# Patient Record
Sex: MALE | Race: OTHER | NOT HISPANIC OR LATINO | Employment: OTHER | URBAN - METROPOLITAN AREA
[De-identification: names, ages, dates, MRNs, and addresses within clinical notes are randomized per-mention and may not be internally consistent; named-entity substitution may affect disease eponyms.]

---

## 2017-08-17 ENCOUNTER — HISTORICAL (OUTPATIENT)
Dept: ADMINISTRATIVE | Facility: HOSPITAL | Age: 62
End: 2017-08-17

## 2017-08-17 LAB
ALBUMIN SERPL-MCNC: 2.5 GM/DL (ref 3.4–5)
ALBUMIN/GLOB SERPL: 0 RATIO (ref 1–2)
ALP SERPL-CCNC: 100 UNIT/L (ref 45–117)
ALT SERPL-CCNC: 49 UNIT/L (ref 12–78)
AMPHET UR QL SCN: NEGATIVE
ANISOCYTOSIS BLD QL SMEAR: ABNORMAL
ANISOCYTOSIS BLD QL SMEAR: ABNORMAL
AST SERPL-CCNC: 69 UNIT/L (ref 15–37)
BARBITURATE SCN PRESENT UR: NEGATIVE
BASOPHILS NFR BLD MANUAL: 1 %
BASOPHILS NFR BLD MANUAL: 1 %
BENZODIAZ UR QL SCN: NEGATIVE
BILIRUB SERPL-MCNC: 0.3 MG/DL (ref 0.2–1)
BILIRUBIN DIRECT+TOT PNL SERPL-MCNC: 0.1 MG/DL
BILIRUBIN DIRECT+TOT PNL SERPL-MCNC: 0.2 MG/DL
BUN SERPL-MCNC: 80 MG/DL (ref 7–18)
CALCIUM SERPL-MCNC: 8.4 MG/DL (ref 8.5–10.1)
CANNABINOIDS UR QL SCN: NEGATIVE
CD3+CD4+ CELLS # SPEC: 16 UNIT/L (ref 589–1505)
CD3+CD4+ CELLS NFR BLD: 7.5 % (ref 31–59)
CHLORIDE SERPL-SCNC: 96 MMOL/L (ref 98–107)
CO2 SERPL-SCNC: 23 MMOL/L (ref 21–32)
COCAINE UR QL SCN: POSITIVE
CREAT SERPL-MCNC: 5.5 MG/DL (ref 0.6–1.3)
EOSINOPHIL NFR BLD MANUAL: 1 %
EOSINOPHIL NFR BLD MANUAL: 1 %
ERYTHROCYTE [DISTWIDTH] IN BLOOD BY AUTOMATED COUNT: 15.9 % (ref 11.5–14.5)
ERYTHROCYTE [DISTWIDTH] IN BLOOD BY AUTOMATED COUNT: 15.9 % (ref 11.5–14.5)
GLOBULIN SER-MCNC: 5.6 GM/ML (ref 2.3–3.5)
GLUCOSE SERPL-MCNC: 93 MG/DL (ref 74–106)
GRANULOCYTES NFR BLD MANUAL: 32 % (ref 43–75)
GRANULOCYTES NFR BLD MANUAL: 32 % (ref 43–75)
HCT VFR BLD AUTO: 24.1 % (ref 40–51)
HCT VFR BLD AUTO: 25.3 % (ref 40–51)
HGB BLD-MCNC: 8.1 GM/DL (ref 13.5–17.5)
HGB BLD-MCNC: 8.3 GM/DL (ref 13.5–17.5)
HYPOCHROMIA BLD QL SMEAR: ABNORMAL
HYPOCHROMIA BLD QL SMEAR: ABNORMAL
LYMPHOCYTES # BLD AUTO: 216 UNIT/L (ref 1260–5520)
LYMPHOCYTES NFR BLD MANUAL: 18 % (ref 20.5–51.1)
LYMPHOCYTES NFR BLD MANUAL: 18 % (ref 20.5–51.1)
LYMPHOCYTES NFR LN MANUAL: 18 % (ref 28–48)
LYMPHOMA - T-CELL MARKERS SPEC-IMP: ABNORMAL
MCH RBC QN AUTO: 28.6 PG (ref 26–34)
MCH RBC QN AUTO: 29.5 PG (ref 26–34)
MCHC RBC AUTO-ENTMCNC: 32.8 GM/DL (ref 31–37)
MCHC RBC AUTO-ENTMCNC: 33.6 GM/DL (ref 31–37)
MCV RBC AUTO: 87.2 FL (ref 80–100)
MCV RBC AUTO: 87.6 FL (ref 80–100)
METAMYELOCYTES NFR BLD MANUAL: 6 %
METAMYELOCYTES NFR BLD MANUAL: 6 %
MONOCYTES NFR BLD MANUAL: 19 % (ref 2–9)
MONOCYTES NFR BLD MANUAL: 19 % (ref 2–9)
NEUTS BAND NFR BLD MANUAL: 23 % (ref 0–10)
NEUTS BAND NFR BLD MANUAL: 23 % (ref 0–10)
OPIATES UR QL SCN: NEGATIVE
PCP UR QL: NEGATIVE
PH UR STRIP.AUTO: 5.5 [PH] (ref 5–8)
PLATELET # BLD AUTO: 127 X10(3)/MCL (ref 130–400)
PLATELET # BLD AUTO: 136 X10(3)/MCL (ref 130–400)
PLATELET # BLD EST: ADEQUATE 10*3/UL
PLATELET # BLD EST: ADEQUATE 10*3/UL
PMV BLD AUTO: 11.3 FL (ref 7.4–10.4)
PMV BLD AUTO: 11.3 FL (ref 7.4–10.4)
POTASSIUM SERPL-SCNC: 4.6 MMOL/L (ref 3.5–5.1)
PROT SERPL-MCNC: 8.1 GM/DL (ref 6.4–8.2)
RBC # BLD AUTO: 2.75 X10(6)/MCL (ref 4.5–5.9)
RBC # BLD AUTO: 2.9 X10(6)/MCL (ref 4.5–5.9)
RBC MORPH BLD: ABNORMAL
RBC MORPH BLD: ABNORMAL
SODIUM SERPL-SCNC: 132 MMOL/L (ref 136–145)
TEMPERATURE, URINE (OHS): 20.6 DEGC (ref 20–25)
WBC # BLD AUTO: 1200 /MM3 (ref 4500–11500)
WBC # SPEC AUTO: 1.1 X10(3)/MCL (ref 4.5–11)
WBC # SPEC AUTO: 1.2 X10(3)/MCL (ref 4.5–11)

## 2017-08-22 LAB
FINAL CULTURE: NORMAL
FINAL CULTURE: NORMAL

## 2017-10-06 ENCOUNTER — HISTORICAL (OUTPATIENT)
Dept: ENDOSCOPY | Facility: HOSPITAL | Age: 62
End: 2017-10-06

## 2017-10-06 LAB
AMPHET UR QL SCN: NEGATIVE
BARBITURATE SCN PRESENT UR: NEGATIVE
BENZODIAZ UR QL SCN: NEGATIVE
BUN SERPL-MCNC: 18 MG/DL (ref 7–18)
CALCIUM SERPL-MCNC: 8.7 MG/DL (ref 8.5–10.1)
CANNABINOIDS UR QL SCN: NEGATIVE
CHLORIDE SERPL-SCNC: 107 MMOL/L (ref 98–107)
CO2 SERPL-SCNC: 27 MMOL/L (ref 21–32)
COCAINE UR QL SCN: POSITIVE
CREAT SERPL-MCNC: 1.1 MG/DL (ref 0.6–1.3)
ERYTHROCYTE [DISTWIDTH] IN BLOOD BY AUTOMATED COUNT: 18.9 % (ref 11.5–14.5)
GLUCOSE SERPL-MCNC: 97 MG/DL (ref 74–106)
HCT VFR BLD AUTO: 22.8 % (ref 40–51)
HGB BLD-MCNC: 7.6 GM/DL (ref 13.5–17.5)
MCH RBC QN AUTO: 31.5 PG (ref 26–34)
MCHC RBC AUTO-ENTMCNC: 33.3 GM/DL (ref 31–37)
MCV RBC AUTO: 94.6 FL (ref 80–100)
OPIATES UR QL SCN: NEGATIVE
PCP UR QL: NEGATIVE
PH UR STRIP.AUTO: 7.5 [PH] (ref 5–8)
PLATELET # BLD AUTO: 341 X10(3)/MCL (ref 130–400)
PMV BLD AUTO: 9.9 FL (ref 7.4–10.4)
POTASSIUM SERPL-SCNC: 3.3 MMOL/L (ref 3.5–5.1)
RBC # BLD AUTO: 2.41 X10(6)/MCL (ref 4.5–5.9)
SODIUM SERPL-SCNC: 141 MMOL/L (ref 136–145)
TEMPERATURE, URINE (OHS): 25 DEGC (ref 20–25)
WBC # SPEC AUTO: 6.4 X10(3)/MCL (ref 4.5–11)

## 2017-10-12 ENCOUNTER — HISTORICAL (OUTPATIENT)
Dept: ADMINISTRATIVE | Facility: HOSPITAL | Age: 62
End: 2017-10-12

## 2017-10-12 LAB
ABS NEUT (OLG): 2.98 X10(3)/MCL (ref 2.1–9.2)
ABS NEUT (OLG): 3.12 X10(3)/MCL (ref 2.1–9.2)
ALBUMIN SERPL-MCNC: 2.4 GM/DL (ref 3.4–5)
ALBUMIN/GLOB SERPL: 0 RATIO (ref 1–2)
ALP SERPL-CCNC: 108 UNIT/L (ref 45–117)
ALT SERPL-CCNC: 45 UNIT/L (ref 12–78)
ANISOCYTOSIS BLD QL SMEAR: ABNORMAL
AST SERPL-CCNC: 87 UNIT/L (ref 15–37)
BASOPHILS NFR BLD MANUAL: 1 %
BASOPHILS NFR BLD MANUAL: 1 %
BILIRUB SERPL-MCNC: 0.4 MG/DL (ref 0.2–1)
BILIRUBIN DIRECT+TOT PNL SERPL-MCNC: 0.2 MG/DL
BILIRUBIN DIRECT+TOT PNL SERPL-MCNC: 0.2 MG/DL
BUN SERPL-MCNC: 21 MG/DL (ref 7–18)
CALCIUM SERPL-MCNC: 8.9 MG/DL (ref 8.5–10.1)
CD3+CD4+ CELLS # SPEC: 49 UNIT/L (ref 589–1505)
CD3+CD4+ CELLS NFR BLD: 10.2 % (ref 31–59)
CHLORIDE SERPL-SCNC: 106 MMOL/L (ref 98–107)
CO2 SERPL-SCNC: 25 MMOL/L (ref 21–32)
CREAT SERPL-MCNC: 1.2 MG/DL (ref 0.6–1.3)
DEPRECATED CALCIDIOL+CALCIFEROL SERPL-MC: 32.3 NG/ML (ref 30–80)
EOSINOPHIL NFR BLD MANUAL: 1 %
EOSINOPHIL NFR BLD MANUAL: 1 %
ERYTHROCYTE [DISTWIDTH] IN BLOOD BY AUTOMATED COUNT: 19.1 % (ref 11.5–14.5)
ERYTHROCYTE [DISTWIDTH] IN BLOOD BY AUTOMATED COUNT: 19.2 % (ref 11.5–14.5)
GLOBULIN SER-MCNC: 6 GM/ML (ref 2.3–3.5)
GLUCOSE SERPL-MCNC: 92 MG/DL (ref 74–106)
GRANULOCYTES NFR BLD MANUAL: 75 % (ref 43–75)
GRANULOCYTES NFR BLD MANUAL: 75 % (ref 43–75)
HCT VFR BLD AUTO: 25.7 % (ref 40–51)
HCT VFR BLD AUTO: 25.7 % (ref 40–51)
HGB BLD-MCNC: 8.3 GM/DL (ref 13.5–17.5)
HGB BLD-MCNC: 8.3 GM/DL (ref 13.5–17.5)
HYPOCHROMIA BLD QL SMEAR: ABNORMAL
HYPOCHROMIA BLD QL SMEAR: ABNORMAL
LYMPHOCYTES # BLD AUTO: 480 UNIT/L (ref 1260–5520)
LYMPHOCYTES NFR BLD MANUAL: 10 % (ref 20.5–51.1)
LYMPHOCYTES NFR BLD MANUAL: 10 % (ref 20.5–51.1)
LYMPHOCYTES NFR LN MANUAL: 10 % (ref 28–48)
LYMPHOMA - T-CELL MARKERS SPEC-IMP: ABNORMAL
MACROCYTES BLD QL SMEAR: ABNORMAL
MACROCYTES BLD QL SMEAR: ABNORMAL
MCH RBC QN AUTO: 31.7 PG (ref 26–34)
MCH RBC QN AUTO: 31.8 PG (ref 26–34)
MCHC RBC AUTO-ENTMCNC: 32.3 GM/DL (ref 31–37)
MCHC RBC AUTO-ENTMCNC: 32.3 GM/DL (ref 31–37)
MCV RBC AUTO: 98.1 FL (ref 80–100)
MCV RBC AUTO: 98.5 FL (ref 80–100)
METAMYELOCYTES NFR BLD MANUAL: 1 %
METAMYELOCYTES NFR BLD MANUAL: 1 %
MICROCYTES BLD QL SMEAR: ABNORMAL
MICROCYTES BLD QL SMEAR: ABNORMAL
MONOCYTES NFR BLD MANUAL: 10 % (ref 2–9)
MONOCYTES NFR BLD MANUAL: 10 % (ref 2–9)
NEUTS BAND NFR BLD MANUAL: 2 % (ref 0–10)
NEUTS BAND NFR BLD MANUAL: 2 % (ref 0–10)
PLATELET # BLD AUTO: 302 X10(3)/MCL (ref 130–400)
PLATELET # BLD AUTO: 313 X10(3)/MCL (ref 130–400)
PLATELET # BLD EST: ADEQUATE 10*3/UL
PLATELET # BLD EST: ADEQUATE 10*3/UL
PMV BLD AUTO: 11.4 FL (ref 7.4–10.4)
PMV BLD AUTO: 11.4 FL (ref 7.4–10.4)
POLYCHROMASIA BLD QL SMEAR: ABNORMAL
POLYCHROMASIA BLD QL SMEAR: ABNORMAL
POTASSIUM SERPL-SCNC: 3.6 MMOL/L (ref 3.5–5.1)
PROT SERPL-MCNC: 8.4 GM/DL (ref 6.4–8.2)
RBC # BLD AUTO: 2.61 X10(6)/MCL (ref 4.5–5.9)
RBC # BLD AUTO: 2.62 X10(6)/MCL (ref 4.5–5.9)
RBC MORPH BLD: ABNORMAL
RBC MORPH BLD: ABNORMAL
SODIUM SERPL-SCNC: 139 MMOL/L (ref 136–145)
WBC # BLD AUTO: 4800 /MM3 (ref 4500–11500)
WBC # SPEC AUTO: 4.8 X10(3)/MCL (ref 4.5–11)
WBC # SPEC AUTO: 5 X10(3)/MCL (ref 4.5–11)

## 2017-10-30 ENCOUNTER — HISTORICAL (OUTPATIENT)
Dept: ENDOSCOPY | Facility: HOSPITAL | Age: 62
End: 2017-10-30

## 2017-10-30 LAB
AMPHET UR QL SCN: NEGATIVE
BARBITURATE SCN PRESENT UR: NEGATIVE
BENZODIAZ UR QL SCN: NEGATIVE
CANNABINOIDS UR QL SCN: NEGATIVE
COCAINE UR QL SCN: NEGATIVE
OPIATES UR QL SCN: NEGATIVE
PCP UR QL: NEGATIVE
PH UR STRIP.AUTO: 5.5 [PH] (ref 5–8)
TEMPERATURE, URINE (OHS): 23.6 DEGC (ref 20–25)

## 2018-01-25 ENCOUNTER — HISTORICAL (OUTPATIENT)
Dept: ADMINISTRATIVE | Facility: HOSPITAL | Age: 63
End: 2018-01-25

## 2018-01-25 LAB
ABS NEUT (OLG): 3.56 X10(3)/MCL (ref 2.1–9.2)
ABS NEUT (OLG): 3.72 X10(3)/MCL (ref 2.1–9.2)
ALBUMIN SERPL-MCNC: 3.7 GM/DL (ref 3.4–5)
ALBUMIN/GLOB SERPL: 1 RATIO (ref 1–2)
ALP SERPL-CCNC: 118 UNIT/L (ref 45–117)
ALT SERPL-CCNC: 126 UNIT/L (ref 12–78)
AST SERPL-CCNC: 83 UNIT/L (ref 15–37)
BASOPHILS # BLD AUTO: 0.06 X10(3)/MCL
BASOPHILS # BLD AUTO: 0.06 X10(3)/MCL
BASOPHILS NFR BLD AUTO: 1 % (ref 0–1)
BASOPHILS NFR BLD AUTO: 1 % (ref 0–1)
BILIRUB SERPL-MCNC: 0.3 MG/DL (ref 0.2–1)
BILIRUBIN DIRECT+TOT PNL SERPL-MCNC: <0.1 MG/DL
BILIRUBIN DIRECT+TOT PNL SERPL-MCNC: ABNORMAL MG/DL
BUN SERPL-MCNC: 27 MG/DL (ref 7–18)
CALCIUM SERPL-MCNC: 9.6 MG/DL (ref 8.5–10.1)
CD3+CD4+ CELLS # SPEC: 150 UNIT/L (ref 589–1505)
CD3+CD4+ CELLS NFR BLD: 9 % (ref 31–59)
CHLORIDE SERPL-SCNC: 107 MMOL/L (ref 98–107)
CO2 SERPL-SCNC: 26 MMOL/L (ref 21–32)
CREAT SERPL-MCNC: 1.6 MG/DL (ref 0.6–1.3)
DEPRECATED CALCIDIOL+CALCIFEROL SERPL-MC: 24.14 NG/ML (ref 30–80)
EOSINOPHIL # BLD AUTO: 0.28 10*3/UL
EOSINOPHIL # BLD AUTO: 0.29 X10(3)/MCL
EOSINOPHIL NFR BLD AUTO: 4 % (ref 0–5)
EOSINOPHIL NFR BLD AUTO: 4 % (ref 0–5)
ERYTHROCYTE [DISTWIDTH] IN BLOOD BY AUTOMATED COUNT: 13.7 % (ref 11.5–14.5)
ERYTHROCYTE [DISTWIDTH] IN BLOOD BY AUTOMATED COUNT: 13.9 % (ref 11.5–14.5)
GLOBULIN SER-MCNC: 6.1 GM/ML (ref 2.3–3.5)
GLUCOSE SERPL-MCNC: 100 MG/DL (ref 74–106)
HCT VFR BLD AUTO: 36.1 % (ref 40–51)
HCT VFR BLD AUTO: 36.6 % (ref 40–51)
HGB BLD-MCNC: 12.2 GM/DL (ref 13.5–17.5)
HGB BLD-MCNC: 12.3 GM/DL (ref 13.5–17.5)
IMM GRANULOCYTES # BLD AUTO: 0.17 10*3/UL
IMM GRANULOCYTES # BLD AUTO: 0.19 10*3/UL
IMM GRANULOCYTES NFR BLD AUTO: 3 %
IMM GRANULOCYTES NFR BLD AUTO: 3 %
LYMPHOCYTES # BLD AUTO: 1.62 X10(3)/MCL
LYMPHOCYTES # BLD AUTO: 1.64 X10(3)/MCL
LYMPHOCYTES # BLD AUTO: 1664 UNIT/L (ref 1260–5520)
LYMPHOCYTES NFR BLD AUTO: 25 % (ref 15–40)
LYMPHOCYTES NFR BLD AUTO: 26 % (ref 15–40)
LYMPHOCYTES NFR LN MANUAL: 26 % (ref 28–48)
LYMPHOMA - T-CELL MARKERS SPEC-IMP: ABNORMAL
MCH RBC QN AUTO: 33.4 PG (ref 26–34)
MCH RBC QN AUTO: 33.4 PG (ref 26–34)
MCHC RBC AUTO-ENTMCNC: 33.6 GM/DL (ref 31–37)
MCHC RBC AUTO-ENTMCNC: 33.8 GM/DL (ref 31–37)
MCV RBC AUTO: 98.9 FL (ref 80–100)
MCV RBC AUTO: 99.5 FL (ref 80–100)
MONOCYTES # BLD AUTO: 0.63 X10(3)/MCL
MONOCYTES # BLD AUTO: 0.72 X10(3)/MCL
MONOCYTES NFR BLD AUTO: 10 % (ref 4–12)
MONOCYTES NFR BLD AUTO: 11 % (ref 4–12)
NEUTROPHILS # BLD AUTO: 3.56 X10(3)/MCL
NEUTROPHILS # BLD AUTO: 3.72 X10(3)/MCL
NEUTROPHILS NFR BLD AUTO: 55 X10(3)/MCL
NEUTROPHILS NFR BLD AUTO: 57 X10(3)/MCL
PLATELET # BLD AUTO: 214 X10(3)/MCL (ref 130–400)
PLATELET # BLD AUTO: 220 X10(3)/MCL (ref 130–400)
PMV BLD AUTO: 11.9 FL (ref 7.4–10.4)
PMV BLD AUTO: 12.1 FL (ref 7.4–10.4)
POTASSIUM SERPL-SCNC: 4.7 MMOL/L (ref 3.5–5.1)
PROT SERPL-MCNC: 9.8 GM/DL (ref 6.4–8.2)
RBC # BLD AUTO: 3.65 X10(6)/MCL (ref 4.5–5.9)
RBC # BLD AUTO: 3.68 X10(6)/MCL (ref 4.5–5.9)
SODIUM SERPL-SCNC: 140 MMOL/L (ref 136–145)
WBC # BLD AUTO: 6400 /MM3 (ref 4500–11500)
WBC # SPEC AUTO: 6.4 X10(3)/MCL (ref 4.5–11)
WBC # SPEC AUTO: 6.5 X10(3)/MCL (ref 4.5–11)

## 2018-07-12 ENCOUNTER — HISTORICAL (OUTPATIENT)
Dept: ADMINISTRATIVE | Facility: HOSPITAL | Age: 63
End: 2018-07-12

## 2018-07-12 LAB
ABS NEUT (OLG): 2.13 X10(3)/MCL (ref 2.1–9.2)
ALBUMIN SERPL-MCNC: 4.2 GM/DL (ref 3.4–5)
ALBUMIN/GLOB SERPL: 1 RATIO (ref 1–2)
ALP SERPL-CCNC: 124 UNIT/L (ref 45–117)
ALT SERPL-CCNC: 49 UNIT/L (ref 12–78)
AST SERPL-CCNC: 41 UNIT/L (ref 15–37)
BASOPHILS # BLD AUTO: 0.05 X10(3)/MCL
BASOPHILS NFR BLD AUTO: 1 %
BILIRUB SERPL-MCNC: 0.4 MG/DL (ref 0.2–1)
BILIRUBIN DIRECT+TOT PNL SERPL-MCNC: 0.2 MG/DL
BILIRUBIN DIRECT+TOT PNL SERPL-MCNC: 0.2 MG/DL
BUN SERPL-MCNC: 13 MG/DL (ref 7–18)
CALCIUM SERPL-MCNC: 9.3 MG/DL (ref 8.5–10.1)
CD3+CD4+ CELLS # SPEC: 145 UNIT/L (ref 589–1505)
CD3+CD4+ CELLS NFR BLD: 11.5 % (ref 31–59)
CHLORIDE SERPL-SCNC: 102 MMOL/L (ref 98–107)
CHOLEST SERPL-MCNC: 169 MG/DL
CHOLEST/HDLC SERPL: 1.8 {RATIO} (ref 0–5)
CO2 SERPL-SCNC: 29 MMOL/L (ref 21–32)
CREAT SERPL-MCNC: 1.7 MG/DL (ref 0.6–1.3)
DEPRECATED CALCIDIOL+CALCIFEROL SERPL-MC: 16.58 NG/ML (ref 30–80)
EOSINOPHIL # BLD AUTO: 0.2 X10(3)/MCL
EOSINOPHIL NFR BLD AUTO: 5 %
ERYTHROCYTE [DISTWIDTH] IN BLOOD BY AUTOMATED COUNT: 13.4 % (ref 11.5–14.5)
EST. AVERAGE GLUCOSE BLD GHB EST-MCNC: 111 MG/DL
FOLATE SERPL-MCNC: 17.9 NG/ML (ref 3.1–17.5)
GLOBULIN SER-MCNC: 5.9 GM/ML (ref 2.3–3.5)
GLUCOSE SERPL-MCNC: 107 MG/DL (ref 74–106)
HAV IGM SERPL QL IA: NONREACTIVE
HBA1C MFR BLD: 5.5 % (ref 4.2–6.3)
HBV CORE IGM SERPL QL IA: NONREACTIVE
HBV SURFACE AG SERPL QL IA: NEGATIVE
HCT VFR BLD AUTO: 39.2 % (ref 40–51)
HCV AB SERPL QL IA: REACTIVE
HDLC SERPL-MCNC: 95 MG/DL
HGB BLD-MCNC: 13.1 GM/DL (ref 13.5–17.5)
IMM GRANULOCYTES # BLD AUTO: 0.03 10*3/UL
IMM GRANULOCYTES NFR BLD AUTO: 1 %
LDLC SERPL CALC-MCNC: 60 MG/DL (ref 0–130)
LYMPHOCYTES # BLD AUTO: 1.29 X10(3)/MCL
LYMPHOCYTES # BLD AUTO: 1260 UNIT/L (ref 1260–5520)
LYMPHOCYTES NFR BLD AUTO: 30 % (ref 13–40)
LYMPHOCYTES NFR LN MANUAL: 30 % (ref 28–48)
LYMPHOMA - T-CELL MARKERS SPEC-IMP: ABNORMAL
MCH RBC QN AUTO: 33.3 PG (ref 26–34)
MCHC RBC AUTO-ENTMCNC: 33.4 GM/DL (ref 31–37)
MCV RBC AUTO: 99.7 FL (ref 80–100)
MONOCYTES # BLD AUTO: 0.55 X10(3)/MCL
MONOCYTES NFR BLD AUTO: 13 % (ref 4–12)
NEUTROPHILS # BLD AUTO: 2.13 X10(3)/MCL
NEUTROPHILS NFR BLD AUTO: 50 X10(3)/MCL
PLATELET # BLD AUTO: 263 X10(3)/MCL (ref 130–400)
PMV BLD AUTO: 10.7 FL (ref 7.4–10.4)
POTASSIUM SERPL-SCNC: 3.6 MMOL/L (ref 3.5–5.1)
PROT SERPL-MCNC: 10.1 GM/DL (ref 6.4–8.2)
RBC # BLD AUTO: 3.93 X10(6)/MCL (ref 4.5–5.9)
RPR SER QL: NORMAL
SODIUM SERPL-SCNC: 140 MMOL/L (ref 136–145)
T PALLIDUM AB SER QL: REACTIVE
T4 FREE SERPL-MCNC: 0.84 NG/DL (ref 0.76–1.46)
TRIGL SERPL-MCNC: 72 MG/DL
TSH SERPL-ACNC: 4 MIU/L (ref 0.36–3.74)
VIT B12 SERPL-MCNC: 261 PG/ML (ref 193–986)
VLDLC SERPL CALC-MCNC: 14 MG/DL
WBC # BLD AUTO: 4200 /MM3 (ref 4500–11500)
WBC # SPEC AUTO: 4.2 X10(3)/MCL (ref 4.5–11)

## 2018-12-13 ENCOUNTER — HISTORICAL (OUTPATIENT)
Dept: ADMINISTRATIVE | Facility: HOSPITAL | Age: 63
End: 2018-12-13

## 2018-12-13 LAB
ABS NEUT (OLG): 2.14 X10(3)/MCL (ref 2.1–9.2)
ALBUMIN SERPL-MCNC: 3.9 GM/DL (ref 3.4–5)
ALBUMIN/GLOB SERPL: 1 RATIO (ref 1–2)
ALP SERPL-CCNC: 123 UNIT/L (ref 45–117)
ALT SERPL-CCNC: 216 UNIT/L (ref 12–78)
APPEARANCE, UA: CLEAR
AST SERPL-CCNC: 208 UNIT/L (ref 15–37)
BACTERIA #/AREA URNS AUTO: ABNORMAL /[HPF]
BASOPHILS # BLD AUTO: 0.05 X10(3)/MCL
BASOPHILS NFR BLD AUTO: 1 %
BILIRUB SERPL-MCNC: 0.2 MG/DL (ref 0.2–1)
BILIRUB UR QL STRIP: NEGATIVE
BILIRUBIN DIRECT+TOT PNL SERPL-MCNC: 0.1 MG/DL
BILIRUBIN DIRECT+TOT PNL SERPL-MCNC: 0.1 MG/DL
BUN SERPL-MCNC: 21 MG/DL (ref 7–18)
CALCIUM SERPL-MCNC: 9.8 MG/DL (ref 8.5–10.1)
CD3+CD4+ CELLS # SPEC: 170 UNIT/L (ref 589–1505)
CD3+CD4+ CELLS NFR BLD: 11.6 % (ref 31–59)
CHLORIDE SERPL-SCNC: 105 MMOL/L (ref 98–107)
CO2 SERPL-SCNC: 25 MMOL/L (ref 21–32)
COLOR UR: YELLOW
CREAT SERPL-MCNC: 1.8 MG/DL (ref 0.6–1.3)
DEPRECATED CALCIDIOL+CALCIFEROL SERPL-MC: 53.58 NG/ML (ref 30–80)
EOSINOPHIL # BLD AUTO: 0.15 X10(3)/MCL
EOSINOPHIL NFR BLD AUTO: 4 %
ERYTHROCYTE [DISTWIDTH] IN BLOOD BY AUTOMATED COUNT: 13.9 % (ref 11.5–14.5)
EST. AVERAGE GLUCOSE BLD GHB EST-MCNC: 108 MG/DL
GLOBULIN SER-MCNC: 6.3 GM/ML (ref 2.3–3.5)
GLUCOSE (UA): NORMAL
GLUCOSE SERPL-MCNC: 106 MG/DL (ref 74–106)
HAV IGM SERPL QL IA: NONREACTIVE
HBA1C MFR BLD: 5.4 % (ref 4.2–6.3)
HBV CORE IGM SERPL QL IA: NONREACTIVE
HBV SURFACE AG SERPL QL IA: NEGATIVE
HCT VFR BLD AUTO: 37.2 % (ref 40–51)
HCV AB SERPL QL IA: REACTIVE
HGB BLD-MCNC: 12.5 GM/DL (ref 13.5–17.5)
HGB UR QL STRIP: NEGATIVE
HYALINE CASTS #/AREA URNS LPF: ABNORMAL /[LPF]
IMM GRANULOCYTES # BLD AUTO: 0.02 10*3/UL
IMM GRANULOCYTES NFR BLD AUTO: 0 %
KETONES UR QL STRIP: NEGATIVE
LEUKOCYTE ESTERASE UR QL STRIP: 25 LEU/UL
LYMPHOCYTES # BLD AUTO: 1.47 X10(3)/MCL
LYMPHOCYTES # BLD AUTO: 1462 UNIT/L (ref 1260–5520)
LYMPHOCYTES NFR BLD AUTO: 34 % (ref 13–40)
LYMPHOCYTES NFR LN MANUAL: 34 % (ref 28–48)
LYMPHOMA - T-CELL MARKERS SPEC-IMP: ABNORMAL
MCH RBC QN AUTO: 32.9 PG (ref 26–34)
MCHC RBC AUTO-ENTMCNC: 33.6 GM/DL (ref 31–37)
MCV RBC AUTO: 97.9 FL (ref 80–100)
MONOCYTES # BLD AUTO: 0.51 X10(3)/MCL
MONOCYTES NFR BLD AUTO: 12 % (ref 4–12)
NEUTROPHILS # BLD AUTO: 2.14 X10(3)/MCL
NEUTROPHILS NFR BLD AUTO: 49 X10(3)/MCL
NITRITE UR QL STRIP: NEGATIVE
PH UR STRIP: 6 [PH] (ref 4.5–8)
PLATELET # BLD AUTO: 274 X10(3)/MCL (ref 130–400)
PMV BLD AUTO: 11.9 FL (ref 7.4–10.4)
POTASSIUM SERPL-SCNC: 3.9 MMOL/L (ref 3.5–5.1)
PROT SERPL-MCNC: 10.2 GM/DL (ref 6.4–8.2)
PROT UR QL STRIP: 30 MG/DL
RBC # BLD AUTO: 3.8 X10(6)/MCL (ref 4.5–5.9)
RBC #/AREA URNS AUTO: ABNORMAL /[HPF]
RPR SER QL: NORMAL
SODIUM SERPL-SCNC: 137 MMOL/L (ref 136–145)
SP GR UR STRIP: 1.02 (ref 1–1.03)
SQUAMOUS #/AREA URNS LPF: ABNORMAL /[LPF]
T PALLIDUM AB SER QL: REACTIVE
UROBILINOGEN UR STRIP-ACNC: NORMAL
WBC # BLD AUTO: 4300 /MM3 (ref 4500–11500)
WBC # SPEC AUTO: 4.3 X10(3)/MCL (ref 4.5–11)
WBC #/AREA URNS AUTO: ABNORMAL /HPF

## 2019-03-28 ENCOUNTER — HISTORICAL (OUTPATIENT)
Dept: ADMINISTRATIVE | Facility: HOSPITAL | Age: 64
End: 2019-03-28

## 2019-03-28 LAB
ABS NEUT (OLG): 2.67 X10(3)/MCL (ref 2.1–9.2)
ALBUMIN SERPL-MCNC: 3.5 GM/DL (ref 3.4–5)
ALBUMIN/GLOB SERPL: 0.6 RATIO (ref 1.1–2)
ALP SERPL-CCNC: 130 UNIT/L (ref 45–117)
ALT SERPL-CCNC: 158 UNIT/L (ref 12–78)
AST SERPL-CCNC: 155 UNIT/L (ref 15–37)
BASOPHILS # BLD AUTO: 0.04 X10(3)/MCL
BASOPHILS NFR BLD AUTO: 1 %
BILIRUB SERPL-MCNC: 0.2 MG/DL (ref 0.2–1)
BILIRUBIN DIRECT+TOT PNL SERPL-MCNC: 0.1 MG/DL
BILIRUBIN DIRECT+TOT PNL SERPL-MCNC: 0.1 MG/DL
BUN SERPL-MCNC: 21 MG/DL (ref 7–18)
CALCIUM SERPL-MCNC: 9.2 MG/DL (ref 8.5–10.1)
CD3+CD4+ CELLS # SPEC: 216 UNIT/L (ref 589–1505)
CD3+CD4+ CELLS NFR BLD: 16 % (ref 31–59)
CHLORIDE SERPL-SCNC: 106 MMOL/L (ref 98–107)
CO2 SERPL-SCNC: 27 MMOL/L (ref 21–32)
CREAT SERPL-MCNC: 1.6 MG/DL (ref 0.6–1.3)
DEPRECATED CALCIDIOL+CALCIFEROL SERPL-MC: 61.98 NG/ML (ref 30–80)
EOSINOPHIL # BLD AUTO: 0.2 10*3/UL
EOSINOPHIL NFR BLD AUTO: 4 %
ERYTHROCYTE [DISTWIDTH] IN BLOOD BY AUTOMATED COUNT: 13.2 % (ref 11.5–14.5)
GLOBULIN SER-MCNC: 5.6 GM/ML (ref 2.3–3.5)
GLUCOSE SERPL-MCNC: 90 MG/DL (ref 74–106)
HAV AB SER QL IA: REACTIVE
HBV SURFACE AB SER-ACNC: 40.96 MIU/ML
HBV SURFACE AB SERPL IA-ACNC: REACTIVE M[IU]/ML
HCT VFR BLD AUTO: 38.4 % (ref 40–51)
HGB BLD-MCNC: 12.6 GM/DL (ref 13.5–17.5)
IMM GRANULOCYTES # BLD AUTO: 0.02 10*3/UL
IMM GRANULOCYTES NFR BLD AUTO: 0 %
INR PPP: 1 (ref 0.9–1.2)
IRON SATN MFR SERPL: 22.9 % (ref 15–50)
IRON SERPL-MCNC: 75 MCG/DL (ref 65–175)
LYMPHOCYTES # BLD AUTO: 1.33 X10(3)/MCL
LYMPHOCYTES # BLD AUTO: 1344 UNIT/L (ref 1260–5520)
LYMPHOCYTES NFR BLD AUTO: 28 % (ref 13–40)
LYMPHOCYTES NFR LN MANUAL: 28 % (ref 28–48)
LYMPHOMA - T-CELL MARKERS SPEC-IMP: ABNORMAL
MCH RBC QN AUTO: 32.3 PG (ref 26–34)
MCHC RBC AUTO-ENTMCNC: 32.8 GM/DL (ref 31–37)
MCV RBC AUTO: 98.5 FL (ref 80–100)
MONOCYTES # BLD AUTO: 0.5 X10(3)/MCL
MONOCYTES NFR BLD AUTO: 10 % (ref 4–12)
NEUTROPHILS # BLD AUTO: 2.67 X10(3)/MCL
NEUTROPHILS NFR BLD AUTO: 56 X10(3)/MCL
PLATELET # BLD AUTO: 299 X10(3)/MCL (ref 130–400)
PMV BLD AUTO: 11.3 FL (ref 7.4–10.4)
POTASSIUM SERPL-SCNC: 4.3 MMOL/L (ref 3.5–5.1)
PROT SERPL-MCNC: 9.1 GM/DL (ref 6.4–8.2)
PROTHROMBIN TIME: 13.1 SECOND(S) (ref 11.9–14.4)
RBC # BLD AUTO: 3.9 X10(6)/MCL (ref 4.5–5.9)
RPR SER QL: NORMAL
SODIUM SERPL-SCNC: 137 MMOL/L (ref 136–145)
T PALLIDUM AB SER QL: REACTIVE
TIBC SERPL-MCNC: 328 MCG/DL (ref 250–450)
TRANSFERRIN SERPL-MCNC: 263 MG/DL (ref 200–360)
TSH SERPL-ACNC: 2.4 MIU/L (ref 0.36–3.74)
WBC # BLD AUTO: 4800 /MM3 (ref 4500–11500)
WBC # SPEC AUTO: 4.8 X10(3)/MCL (ref 4.5–11)

## 2019-04-25 ENCOUNTER — HISTORICAL (OUTPATIENT)
Dept: ADMINISTRATIVE | Facility: HOSPITAL | Age: 64
End: 2019-04-25

## 2019-05-28 ENCOUNTER — HISTORICAL (OUTPATIENT)
Dept: RADIOLOGY | Facility: HOSPITAL | Age: 64
End: 2019-05-28

## 2019-09-05 ENCOUNTER — HISTORICAL (OUTPATIENT)
Dept: ADMINISTRATIVE | Facility: HOSPITAL | Age: 64
End: 2019-09-05

## 2019-09-05 LAB
ABS NEUT (OLG): 4.12 X10(3)/MCL (ref 2.1–9.2)
ALBUMIN SERPL-MCNC: 3.9 GM/DL (ref 3.4–5)
ALBUMIN/GLOB SERPL: 0.7 RATIO (ref 1.1–2)
ALP SERPL-CCNC: 106 UNIT/L (ref 45–117)
ALT SERPL-CCNC: 20 UNIT/L (ref 12–78)
AST SERPL-CCNC: 26 UNIT/L (ref 15–37)
BASOPHILS # BLD AUTO: 0.1 X10(3)/MCL (ref 0–0.2)
BASOPHILS NFR BLD AUTO: 1 %
BILIRUB SERPL-MCNC: 0.3 MG/DL (ref 0.2–1)
BILIRUBIN DIRECT+TOT PNL SERPL-MCNC: 0.1 MG/DL
BILIRUBIN DIRECT+TOT PNL SERPL-MCNC: 0.2 MG/DL
BUN SERPL-MCNC: 27 MG/DL (ref 7–18)
CALCIUM SERPL-MCNC: 9.4 MG/DL (ref 8.5–10.1)
CD3+CD4+ CELLS # SPEC: 323 UNIT/L (ref 589–1505)
CD3+CD4+ CELLS NFR BLD: 16.5 % (ref 31–59)
CHLORIDE SERPL-SCNC: 106 MMOL/L (ref 98–107)
CO2 SERPL-SCNC: 26 MMOL/L (ref 21–32)
CREAT SERPL-MCNC: 3 MG/DL (ref 0.6–1.3)
DEPRECATED CALCIDIOL+CALCIFEROL SERPL-MC: 60.73 NG/ML (ref 30–80)
EOSINOPHIL # BLD AUTO: 0.2 X10(3)/MCL (ref 0–0.9)
EOSINOPHIL NFR BLD AUTO: 2 %
ERYTHROCYTE [DISTWIDTH] IN BLOOD BY AUTOMATED COUNT: 14.3 % (ref 11.5–14.5)
GLOBULIN SER-MCNC: 5.8 GM/ML (ref 2.3–3.5)
GLUCOSE SERPL-MCNC: 80 MG/DL (ref 74–106)
HCT VFR BLD AUTO: 40.1 % (ref 40–51)
HGB BLD-MCNC: 13.2 GM/DL (ref 13.5–17.5)
HIV 1+2 AB+HIV1 P24 AG SERPL QL IA: REACTIVE
IMM GRANULOCYTES # BLD AUTO: 0.05 10*3/UL
IMM GRANULOCYTES NFR BLD AUTO: 1 %
LYMPHOCYTES # BLD AUTO: 1.9 X10(3)/MCL (ref 0.6–4.6)
LYMPHOCYTES # BLD AUTO: 1960 UNIT/L (ref 1260–5520)
LYMPHOCYTES NFR BLD AUTO: 28 %
LYMPHOCYTES NFR LN MANUAL: 28 % (ref 28–48)
LYMPHOMA - T-CELL MARKERS SPEC-IMP: ABNORMAL
MCH RBC QN AUTO: 32.7 PG (ref 26–34)
MCHC RBC AUTO-ENTMCNC: 32.9 GM/DL (ref 31–37)
MCV RBC AUTO: 99.3 FL (ref 80–100)
MONOCYTES # BLD AUTO: 0.6 X10(3)/MCL (ref 0.1–1.3)
MONOCYTES NFR BLD AUTO: 9 %
NEUTROPHILS # BLD AUTO: 4.12 X10(3)/MCL (ref 2.1–9.2)
NEUTROPHILS NFR BLD AUTO: 59 %
PLATELET # BLD AUTO: 304 X10(3)/MCL (ref 130–400)
PMV BLD AUTO: 11.1 FL (ref 7.4–10.4)
POTASSIUM SERPL-SCNC: 4 MMOL/L (ref 3.5–5.1)
PROT SERPL-MCNC: 9.7 GM/DL (ref 6.4–8.2)
RBC # BLD AUTO: 4.04 X10(6)/MCL (ref 4.5–5.9)
SODIUM SERPL-SCNC: 135 MMOL/L (ref 136–145)
WBC # BLD AUTO: 7000 /MM3 (ref 4500–11500)
WBC # SPEC AUTO: 7 X10(3)/MCL (ref 4.5–11)

## 2020-01-21 ENCOUNTER — HISTORICAL (OUTPATIENT)
Dept: ADMINISTRATIVE | Facility: HOSPITAL | Age: 65
End: 2020-01-21

## 2020-01-21 LAB
ABS NEUT (OLG): 2.79 X10(3)/MCL (ref 2.1–9.2)
ALBUMIN SERPL-MCNC: 4 GM/DL (ref 3.4–5)
ALBUMIN/GLOB SERPL: 0.7 RATIO (ref 1.1–2)
ALP SERPL-CCNC: 127 UNIT/L (ref 45–117)
ALT SERPL-CCNC: 32 UNIT/L (ref 12–78)
APPEARANCE, UA: ABNORMAL
AST SERPL-CCNC: 32 UNIT/L (ref 15–37)
BACTERIA #/AREA URNS AUTO: ABNORMAL /HPF
BASOPHILS # BLD AUTO: 0.1 X10(3)/MCL (ref 0–0.2)
BASOPHILS NFR BLD AUTO: 1 %
BILIRUB SERPL-MCNC: 0.2 MG/DL (ref 0.2–1)
BILIRUB UR QL STRIP: NEGATIVE
BILIRUBIN DIRECT+TOT PNL SERPL-MCNC: <0.1 MG/DL (ref 0–0.2)
BILIRUBIN DIRECT+TOT PNL SERPL-MCNC: ABNORMAL MG/DL
BUN SERPL-MCNC: 17 MG/DL (ref 7–18)
CALCIUM SERPL-MCNC: 9.5 MG/DL (ref 8.5–10.1)
CD3+CD4+ CELLS # SPEC: 296 UNIT/L (ref 589–1505)
CD3+CD4+ CELLS NFR BLD: 21.6 % (ref 31–59)
CHLORIDE SERPL-SCNC: 105 MMOL/L (ref 98–107)
CHOLEST SERPL-MCNC: 176 MG/DL
CHOLEST/HDLC SERPL: 3 {RATIO} (ref 0–5)
CO2 SERPL-SCNC: 27 MMOL/L (ref 21–32)
COLOR UR: YELLOW
CREAT SERPL-MCNC: 2.3 MG/DL (ref 0.6–1.3)
DEPRECATED CALCIDIOL+CALCIFEROL SERPL-MC: 36.24 NG/ML (ref 30–80)
EOSINOPHIL # BLD AUTO: 0.1 X10(3)/MCL (ref 0–0.9)
EOSINOPHIL NFR BLD AUTO: 2 %
ERYTHROCYTE [DISTWIDTH] IN BLOOD BY AUTOMATED COUNT: 13.4 % (ref 11.5–14.5)
EST. AVERAGE GLUCOSE BLD GHB EST-MCNC: 143 MG/DL
GLOBULIN SER-MCNC: 5.9 GM/ML (ref 2.3–3.5)
GLUCOSE (UA): NEGATIVE
GLUCOSE SERPL-MCNC: 62 MG/DL (ref 74–106)
HBA1C MFR BLD: 6.6 % (ref 4.2–6.3)
HBV SURFACE AG SERPL QL IA: NEGATIVE
HCT VFR BLD AUTO: 43.3 % (ref 40–51)
HDLC SERPL-MCNC: 59 MG/DL (ref 40–59)
HGB BLD-MCNC: 14.1 GM/DL (ref 13.5–17.5)
HGB UR QL STRIP: NEGATIVE
HYALINE CASTS #/AREA URNS LPF: ABNORMAL /LPF
IMM GRANULOCYTES # BLD AUTO: 0.03 10*3/UL
IMM GRANULOCYTES NFR BLD AUTO: 1 %
KETONES UR QL STRIP: ABNORMAL
LDLC SERPL CALC-MCNC: 69 MG/DL
LEUKOCYTE ESTERASE UR QL STRIP: 75 LEU/UL
LYMPHOCYTES # BLD AUTO: 1.4 X10(3)/MCL (ref 0.6–4.6)
LYMPHOCYTES # BLD AUTO: 1372 UNIT/L (ref 1260–5520)
LYMPHOCYTES NFR BLD AUTO: 28 %
LYMPHOCYTES NFR LN MANUAL: 28 % (ref 28–48)
LYMPHOMA - T-CELL MARKERS SPEC-IMP: ABNORMAL
MCH RBC QN AUTO: 32.3 PG (ref 26–34)
MCHC RBC AUTO-ENTMCNC: 32.6 GM/DL (ref 31–37)
MCV RBC AUTO: 99.3 FL (ref 80–100)
MONOCYTES # BLD AUTO: 0.6 X10(3)/MCL (ref 0.1–1.3)
MONOCYTES NFR BLD AUTO: 12 %
NEG CONT SPOT COUNT: NORMAL
NEUTROPHILS # BLD AUTO: 2.79 X10(3)/MCL (ref 2.1–9.2)
NEUTROPHILS NFR BLD AUTO: 57 %
NITRITE UR QL STRIP: NEGATIVE
PANEL A SPOT COUNT: 1
PANEL B SPOT COUNT: 0
PH UR STRIP: 5.5 [PH] (ref 4.5–8)
PLATELET # BLD AUTO: 319 X10(3)/MCL (ref 130–400)
PMV BLD AUTO: 10.5 FL (ref 7.4–10.4)
POS CONT SPOT COUNT: NORMAL
POTASSIUM SERPL-SCNC: 3.9 MMOL/L (ref 3.5–5.1)
PROT SERPL-MCNC: 9.9 GM/DL (ref 6.4–8.2)
PROT UR QL STRIP: 100 MG/DL
RBC # BLD AUTO: 4.36 X10(6)/MCL (ref 4.5–5.9)
RBC #/AREA URNS AUTO: ABNORMAL /HPF
RPR SER QL: NORMAL
SODIUM SERPL-SCNC: 137 MMOL/L (ref 136–145)
SP GR UR STRIP: 1.02 (ref 1–1.03)
SQUAMOUS #/AREA URNS LPF: >100 /LPF
T PALLIDUM AB SER QL: REACTIVE
T-SPOT.TB: NORMAL
T4 FREE SERPL-MCNC: 0.67 NG/DL (ref 0.76–1.46)
TRIGL SERPL-MCNC: 239 MG/DL
TSH SERPL-ACNC: 9.17 MIU/L (ref 0.36–3.74)
UROBILINOGEN UR STRIP-ACNC: 2 MG/DL
VLDLC SERPL CALC-MCNC: 48 MG/DL
WBC # BLD AUTO: 4900 /MM3 (ref 4500–11500)
WBC # SPEC AUTO: 4.9 X10(3)/MCL (ref 4.5–11)
WBC #/AREA URNS AUTO: ABNORMAL /HPF

## 2020-10-08 ENCOUNTER — HISTORICAL (OUTPATIENT)
Dept: ADMINISTRATIVE | Facility: HOSPITAL | Age: 65
End: 2020-10-08

## 2020-10-08 LAB
ABS NEUT (OLG): 4.46 X10(3)/MCL (ref 2.1–9.2)
ALBUMIN SERPL-MCNC: 4 GM/DL (ref 3.4–5)
ALBUMIN/GLOB SERPL: 0.8 RATIO (ref 1.1–2)
ALP SERPL-CCNC: 113 UNIT/L (ref 45–117)
ALT SERPL-CCNC: 29 UNIT/L (ref 12–78)
APPEARANCE, UA: CLEAR
AST SERPL-CCNC: 21 UNIT/L (ref 15–37)
BACTERIA #/AREA URNS AUTO: ABNORMAL /HPF
BASOPHILS # BLD AUTO: 0.1 X10(3)/MCL (ref 0–0.2)
BASOPHILS NFR BLD AUTO: 1 %
BILIRUB SERPL-MCNC: 0.3 MG/DL (ref 0.2–1)
BILIRUB UR QL STRIP: NEGATIVE
BILIRUBIN DIRECT+TOT PNL SERPL-MCNC: 0.1 MG/DL (ref 0–0.2)
BILIRUBIN DIRECT+TOT PNL SERPL-MCNC: 0.2 MG/DL
BUN SERPL-MCNC: 15 MG/DL (ref 7–18)
CALCIUM SERPL-MCNC: 9.5 MG/DL (ref 8.5–10.1)
CD3+CD4+ CELLS # SPEC: 354 UNIT/L (ref 589–1505)
CD3+CD4+ CELLS NFR BLD: 29.8 % (ref 31–59)
CHLORIDE SERPL-SCNC: 106 MMOL/L (ref 98–107)
CO2 SERPL-SCNC: 28 MMOL/L (ref 21–32)
COLOR UR: YELLOW
CREAT SERPL-MCNC: 1.8 MG/DL (ref 0.6–1.3)
EOSINOPHIL # BLD AUTO: 0.1 X10(3)/MCL (ref 0–0.9)
EOSINOPHIL NFR BLD AUTO: 1 %
ERYTHROCYTE [DISTWIDTH] IN BLOOD BY AUTOMATED COUNT: 14.2 % (ref 11.5–14.5)
GLOBULIN SER-MCNC: 5.3 GM/ML (ref 2.3–3.5)
GLUCOSE (UA): NEGATIVE
GLUCOSE SERPL-MCNC: 99 MG/DL (ref 74–106)
HBV SURFACE AG SERPL QL IA: NONREACTIVE
HCT VFR BLD AUTO: 40.5 % (ref 40–51)
HGB BLD-MCNC: 13.3 GM/DL (ref 13.5–17.5)
HGB UR QL STRIP: NEGATIVE
HYALINE CASTS #/AREA URNS LPF: ABNORMAL /LPF
IMM GRANULOCYTES # BLD AUTO: 0.03 10*3/UL
IMM GRANULOCYTES NFR BLD AUTO: 0 %
KETONES UR QL STRIP: NEGATIVE
LEUKOCYTE ESTERASE UR QL STRIP: NEGATIVE
LYMPHOCYTES # BLD AUTO: 1.2 X10(3)/MCL (ref 0.6–4.6)
LYMPHOCYTES # BLD AUTO: 1188 UNIT/L (ref 1260–5520)
LYMPHOCYTES NFR BLD AUTO: 18 %
LYMPHOCYTES NFR LN MANUAL: 18 % (ref 28–48)
LYMPHOMA - T-CELL MARKERS SPEC-IMP: ABNORMAL
MCH RBC QN AUTO: 32.5 PG (ref 26–34)
MCHC RBC AUTO-ENTMCNC: 32.8 GM/DL (ref 31–37)
MCV RBC AUTO: 99 FL (ref 80–100)
MONOCYTES # BLD AUTO: 0.8 X10(3)/MCL (ref 0.1–1.3)
MONOCYTES NFR BLD AUTO: 12 %
NEUTROPHILS # BLD AUTO: 4.46 X10(3)/MCL (ref 2.1–9.2)
NEUTROPHILS NFR BLD AUTO: 67 %
NITRITE UR QL STRIP: NEGATIVE
PH UR STRIP: 6 [PH] (ref 4.5–8)
PLATELET # BLD AUTO: 292 X10(3)/MCL (ref 130–400)
PMV BLD AUTO: 10.9 FL (ref 7.4–10.4)
POTASSIUM SERPL-SCNC: 3.5 MMOL/L (ref 3.5–5.1)
PROT SERPL-MCNC: 9.3 GM/DL (ref 6.4–8.2)
PROT UR QL STRIP: 20 MG/DL
RBC # BLD AUTO: 4.09 X10(6)/MCL (ref 4.5–5.9)
RBC #/AREA URNS AUTO: ABNORMAL /HPF
RPR SER QL: NORMAL
SODIUM SERPL-SCNC: 138 MMOL/L (ref 136–145)
SP GR UR STRIP: 1.01 (ref 1–1.03)
SQUAMOUS #/AREA URNS LPF: ABNORMAL /LPF
T PALLIDUM AB SER QL: REACTIVE
T4 FREE SERPL-MCNC: 0.9 NG/DL (ref 0.76–1.46)
TSH SERPL-ACNC: 4.29 MIU/L (ref 0.36–3.74)
UROBILINOGEN UR STRIP-ACNC: NORMAL
WBC # BLD AUTO: 6600 /MM3 (ref 4500–11500)
WBC # SPEC AUTO: 6.6 X10(3)/MCL (ref 4.5–11)
WBC #/AREA URNS AUTO: ABNORMAL /HPF

## 2020-11-13 ENCOUNTER — HISTORICAL (OUTPATIENT)
Dept: NEPHROLOGY | Facility: CLINIC | Age: 65
End: 2020-11-13

## 2020-11-13 LAB
NEG CONT SPOT COUNT: NORMAL
PANEL A SPOT COUNT: 0
PANEL B SPOT COUNT: 0
POS CONT SPOT COUNT: NORMAL
T-SPOT.TB: NORMAL

## 2020-12-16 ENCOUNTER — HISTORICAL (OUTPATIENT)
Dept: ADMINISTRATIVE | Facility: HOSPITAL | Age: 65
End: 2020-12-16

## 2021-01-15 ENCOUNTER — HISTORICAL (OUTPATIENT)
Dept: ADMINISTRATIVE | Facility: HOSPITAL | Age: 66
End: 2021-01-15

## 2021-01-25 ENCOUNTER — HISTORICAL (OUTPATIENT)
Dept: ADMINISTRATIVE | Facility: HOSPITAL | Age: 66
End: 2021-01-25

## 2021-02-26 ENCOUNTER — HISTORICAL (OUTPATIENT)
Dept: ADMINISTRATIVE | Facility: HOSPITAL | Age: 66
End: 2021-02-26

## 2021-08-05 ENCOUNTER — HISTORICAL (OUTPATIENT)
Dept: ADMINISTRATIVE | Facility: HOSPITAL | Age: 66
End: 2021-08-05

## 2021-08-05 LAB
ABS NEUT (OLG): 3.02 X10(3)/MCL (ref 2.1–9.2)
ALBUMIN SERPL-MCNC: 4.1 GM/DL (ref 3.4–4.8)
ALBUMIN/GLOB SERPL: 0.9 RATIO (ref 1.1–2)
ALP SERPL-CCNC: 102 UNIT/L (ref 40–150)
ALT SERPL-CCNC: 21 UNIT/L (ref 0–55)
AMPHET UR QL SCN: NEGATIVE
APPEARANCE, UA: CLEAR
AST SERPL-CCNC: 27 UNIT/L (ref 5–34)
BACTERIA #/AREA URNS AUTO: ABNORMAL /HPF
BARBITURATE SCN PRESENT UR: NEGATIVE
BASOPHILS # BLD AUTO: 0.1 X10(3)/MCL (ref 0–0.2)
BASOPHILS NFR BLD AUTO: 1 %
BENZODIAZ UR QL SCN: NEGATIVE
BILIRUB SERPL-MCNC: 0.5 MG/DL
BILIRUB UR QL STRIP: NEGATIVE
BILIRUBIN DIRECT+TOT PNL SERPL-MCNC: 0.2 MG/DL (ref 0–0.5)
BILIRUBIN DIRECT+TOT PNL SERPL-MCNC: 0.3 MG/DL (ref 0–0.8)
BUN SERPL-MCNC: 20.4 MG/DL (ref 8.4–25.7)
CALCIUM SERPL-MCNC: 10.1 MG/DL (ref 8.8–10)
CANNABINOIDS UR QL SCN: NEGATIVE
CD3+CD4+ CELLS # SPEC: 444 UNIT/L (ref 589–1505)
CD3+CD4+ CELLS NFR BLD: 25.5 % (ref 31–59)
CHLORIDE SERPL-SCNC: 105 MMOL/L (ref 98–107)
CHOLEST SERPL-MCNC: 160 MG/DL
CHOLEST/HDLC SERPL: 3 {RATIO} (ref 0–5)
CO2 SERPL-SCNC: 30 MMOL/L (ref 23–31)
COCAINE UR QL SCN: POSITIVE
COLOR UR: YELLOW
CREAT SERPL-MCNC: 1.99 MG/DL (ref 0.73–1.18)
DEPRECATED CALCIDIOL+CALCIFEROL SERPL-MC: 35.6 NG/ML (ref 30–80)
EOSINOPHIL # BLD AUTO: 0.3 X10(3)/MCL (ref 0–0.9)
EOSINOPHIL NFR BLD AUTO: 5 %
ERYTHROCYTE [DISTWIDTH] IN BLOOD BY AUTOMATED COUNT: 14.6 % (ref 11.5–14.5)
GLOBULIN SER-MCNC: 4.7 GM/DL (ref 2.4–3.5)
GLUCOSE (UA): NEGATIVE
GLUCOSE SERPL-MCNC: 100 MG/DL (ref 82–115)
HAV IGM SERPL QL IA: NONREACTIVE
HBV CORE IGM SERPL QL IA: NONREACTIVE
HBV SURFACE AG SERPL QL IA: NONREACTIVE
HCT VFR BLD AUTO: 43.6 % (ref 40–51)
HCV AB SERPL QL IA: REACTIVE
HDLC SERPL-MCNC: 62 MG/DL (ref 35–60)
HGB BLD-MCNC: 14.3 GM/DL (ref 13.5–17.5)
HGB UR QL STRIP: NEGATIVE
HYALINE CASTS #/AREA URNS LPF: ABNORMAL /LPF
IMM GRANULOCYTES # BLD AUTO: 0.04 10*3/UL
IMM GRANULOCYTES NFR BLD AUTO: 1 %
KETONES UR QL STRIP: NEGATIVE
LDLC SERPL CALC-MCNC: 68 MG/DL (ref 50–140)
LEUKOCYTE ESTERASE UR QL STRIP: 75 LEU/UL
LYMPHOCYTES # BLD AUTO: 1.7 X10(3)/MCL (ref 0.6–4.6)
LYMPHOCYTES # BLD AUTO: 1740 UNIT/L (ref 1260–5520)
LYMPHOCYTES NFR BLD AUTO: 30 %
LYMPHOCYTES NFR LN MANUAL: 30 % (ref 28–48)
LYMPHOMA - T-CELL MARKERS SPEC-IMP: ABNORMAL
MCH RBC QN AUTO: 32.1 PG (ref 26–34)
MCHC RBC AUTO-ENTMCNC: 32.8 GM/DL (ref 31–37)
MCV RBC AUTO: 97.8 FL (ref 80–100)
MONOCYTES # BLD AUTO: 0.6 X10(3)/MCL (ref 0.1–1.3)
MONOCYTES NFR BLD AUTO: 11 %
NEG CONT SPOT COUNT: NORMAL
NEUTROPHILS # BLD AUTO: 3.02 X10(3)/MCL (ref 2.1–9.2)
NEUTROPHILS NFR BLD AUTO: 52 %
NITRITE UR QL STRIP: NEGATIVE
NRBC BLD AUTO-RTO: 0 % (ref 0–0.2)
OPIATES UR QL SCN: NEGATIVE
PANEL A SPOT COUNT: 0
PANEL B SPOT COUNT: 1
PCP UR QL: NEGATIVE
PH UR STRIP.AUTO: 6 [PH] (ref 3–11)
PH UR STRIP: 6 [PH] (ref 4.5–8)
PLATELET # BLD AUTO: 312 X10(3)/MCL (ref 130–400)
PMV BLD AUTO: 10.9 FL (ref 7.4–10.4)
POS CONT SPOT COUNT: NORMAL
POTASSIUM SERPL-SCNC: 4.6 MMOL/L (ref 3.5–5.1)
PROT SERPL-MCNC: 8.8 GM/DL (ref 5.8–7.6)
PROT UR QL STRIP: 30 MG/DL
RBC # BLD AUTO: 4.46 X10(6)/MCL (ref 4.5–5.9)
RBC #/AREA URNS AUTO: ABNORMAL /HPF
RPR SER QL: NORMAL
SODIUM SERPL-SCNC: 140 MMOL/L (ref 136–145)
SP GR UR STRIP: 1.02 (ref 1–1.03)
SQUAMOUS #/AREA URNS LPF: ABNORMAL /LPF
T PALLIDUM AB SER QL: REACTIVE
T-SPOT.TB: NORMAL
TRIGL SERPL-MCNC: 152 MG/DL (ref 34–140)
TSH SERPL-ACNC: 1.29 UIU/ML (ref 0.35–4.94)
UROBILINOGEN UR STRIP-ACNC: NORMAL
VLDLC SERPL CALC-MCNC: 30 MG/DL
WBC # BLD AUTO: 5800 /MM3 (ref 4500–11500)
WBC # SPEC AUTO: 5.8 X10(3)/MCL (ref 4.5–11)
WBC #/AREA URNS AUTO: ABNORMAL /HPF

## 2021-08-07 LAB — FINAL CULTURE: NO GROWTH

## 2022-01-20 ENCOUNTER — HISTORICAL (OUTPATIENT)
Dept: ADMINISTRATIVE | Facility: HOSPITAL | Age: 67
End: 2022-01-20

## 2022-01-20 LAB
ABS NEUT (OLG): 3.59 X10(3)/MCL (ref 2.1–9.2)
ALBUMIN SERPL-MCNC: 4.3 GM/DL (ref 3.4–4.8)
ALBUMIN/GLOB SERPL: 0.9 RATIO (ref 1.1–2)
ALP SERPL-CCNC: 109 UNIT/L (ref 40–150)
ALT SERPL-CCNC: 30 UNIT/L (ref 0–55)
APPEARANCE, UA: CLEAR
AST SERPL-CCNC: 34 UNIT/L (ref 5–34)
BACTERIA SPEC CULT: ABNORMAL
BASOPHILS # BLD AUTO: 0.1 X10(3)/MCL (ref 0–0.2)
BASOPHILS NFR BLD AUTO: 1 %
BILIRUB SERPL-MCNC: 0.2 MG/DL
BILIRUB UR QL STRIP: NEGATIVE
BILIRUBIN DIRECT+TOT PNL SERPL-MCNC: 0.1 MG/DL (ref 0–0.5)
BILIRUBIN DIRECT+TOT PNL SERPL-MCNC: 0.1 MG/DL (ref 0–0.8)
BUN SERPL-MCNC: 27.5 MG/DL (ref 8.4–25.7)
CALCIUM SERPL-MCNC: 9.9 MG/DL (ref 8.7–10.5)
CHLORIDE SERPL-SCNC: 106 MMOL/L (ref 98–107)
CO2 SERPL-SCNC: 24 MMOL/L (ref 23–31)
COLOR UR: ABNORMAL
CREAT SERPL-MCNC: 1.79 MG/DL (ref 0.73–1.18)
DEPRECATED CALCIDIOL+CALCIFEROL SERPL-MC: 27.1 NG/ML (ref 30–80)
EOSINOPHIL # BLD AUTO: 0.4 X10(3)/MCL (ref 0–0.9)
EOSINOPHIL NFR BLD AUTO: 6 %
ERYTHROCYTE [DISTWIDTH] IN BLOOD BY AUTOMATED COUNT: 14.6 % (ref 11.5–14.5)
GLOBULIN SER-MCNC: 4.7 GM/DL (ref 2.4–3.5)
GLUCOSE (UA): NORMAL /UL
GLUCOSE SERPL-MCNC: 95 MG/DL (ref 82–115)
HBV SURFACE AG SERPL QL IA: NONREACTIVE
HCT VFR BLD AUTO: 40.2 % (ref 40–51)
HGB BLD-MCNC: 13.2 GM/DL (ref 13.5–17.5)
HGB UR QL STRIP: NEGATIVE /HPF
HYALINE CASTS #/AREA URNS LPF: ABNORMAL /LPF
IMM GRANULOCYTES # BLD AUTO: 0.07 10*3/UL
IMM GRANULOCYTES NFR BLD AUTO: 1 %
KETONES UR QL STRIP: NEGATIVE /UL
LEUKOCYTE ESTERASE UR QL STRIP: NEGATIVE
LYMPHOCYTES # BLD AUTO: 2.3 X10(3)/MCL (ref 0.6–4.6)
LYMPHOCYTES NFR BLD AUTO: 31 %
MCH RBC QN AUTO: 32.3 PG (ref 26–34)
MCHC RBC AUTO-ENTMCNC: 32.8 GM/DL (ref 31–37)
MCV RBC AUTO: 98.3 FL (ref 80–100)
MONOCYTES # BLD AUTO: 0.9 X10(3)/MCL (ref 0.1–1.3)
MONOCYTES NFR BLD AUTO: 12 %
MUCOUS THREADS URNS QL MICRO: ABNORMAL /LPF
NEUTROPHILS # BLD AUTO: 3.59 X10(3)/MCL (ref 2.1–9.2)
NEUTROPHILS NFR BLD AUTO: 49 %
NITRITE UR QL STRIP: NEGATIVE
NRBC BLD AUTO-RTO: 0 % (ref 0–0.2)
PH UR STRIP: 5.5 /UL (ref 4.5–8)
PLATELET # BLD AUTO: 261 X10(3)/MCL (ref 130–400)
PMV BLD AUTO: 11.7 FL (ref 7.4–10.4)
POTASSIUM SERPL-SCNC: 4.6 MMOL/L (ref 3.5–5.1)
PROT SERPL-MCNC: 9 GM/DL (ref 5.8–7.6)
PROT UR QL STRIP: NEGATIVE /UL
RBC # BLD AUTO: 4.09 X10(6)/MCL (ref 4.5–5.9)
RBC #/AREA URNS HPF: ABNORMAL /HPF
SODIUM SERPL-SCNC: 139 MMOL/L (ref 136–145)
SP GR UR STRIP: 1.02 (ref 1–1.03)
SQUAMOUS EPITHELIAL, UA: ABNORMAL /HPF
T PALLIDUM AB SER QL: REACTIVE
UROBILINOGEN UR STRIP-ACNC: NORMAL /HPF
WBC # SPEC AUTO: 7.4 X10(3)/MCL (ref 4.5–11)
WBC #/AREA URNS HPF: ABNORMAL /HPF

## 2022-04-10 ENCOUNTER — HISTORICAL (OUTPATIENT)
Dept: ADMINISTRATIVE | Facility: HOSPITAL | Age: 67
End: 2022-04-10
Payer: MEDICARE

## 2022-04-28 VITALS
BODY MASS INDEX: 29.95 KG/M2 | SYSTOLIC BLOOD PRESSURE: 139 MMHG | OXYGEN SATURATION: 99 % | HEIGHT: 68 IN | DIASTOLIC BLOOD PRESSURE: 72 MMHG | WEIGHT: 197.63 LBS

## 2022-05-03 NOTE — HISTORICAL [facility]
This is a historical note converted from [place]. Formatting and pictures may have been removed.  Please reference [facility] for original formatting and attached multimedia. Chief Complaint  f/u HIV needs med refills  History of Present Illness  Mr. Renetta Glover is a 62-year-old -American male with past medical history of HIV, last CD4 count 150 and January?2018, presents for follow-up visit.??Patient is currently being treated with?Descovy and Tivicay reports compliance.? Patient also reports compliance?with?Bactrim and fluconazole.? Patient states his esophagitis is doing better, but he continues to have trouble chewing.? He attributes this to?his poor dentition?and states he has plans to get dentures.? Patient also complains of right hip pain with ambulation.? He states that this started about 6 weeks ago.? Pain is made worse by ambulation.? There are no associated relieving factors.? He has had pains similar in the past but they usually go away.? This time is constant.? He describes it as a dull achy pain.? No fevers, no sweats, chills, no abdominal pain,?no chest pain,?no difficulty breathing.  Review of Systems  Constitutional: No recent changes in weight or appetite. No fevers, no chills, no recent illness or sick contacts  Head: No headache, no neck pain  Eyes: No blurry vision, no double vision  Ears: No hearing loss, no tinnitus  Nose/mouth: No rhinorrhea, no congestion, no sore throat  Cardiac: No chest pain, no palpitations, no lower extremity edema, no orthopnea, no paroxysmal nocturnal dyspena  Resp: No shortness of breath, no dyspnea on exertion, no coughing, no hemoptysis  GI: No abdominal pain, no constipation, no diarrhea, no nausea, no emesis, no hematochezia or melena  Gu: No dysuria, no hematuria, no incontinence  Musculoskeletal: Endorses?right hip pain. ?No muscle weakness. Ambulatory without assistance at baseline  Neurologic: No loss of sensation, no dizziness, no syncope  Skin: No  rash, no jaundice, no sores  Endocrine: No polyuria, no polydipsia, no heat/cold intolerance  Psychiatric: No depression, no anxiety, no SI/HI  Physical Exam  Vitals & Measurements  T:?36.7? ?C (Oral)? HR:?91(Peripheral)? RR:?18? BP:?168/96?  HT:?172?cm? HT:?172?cm? WT:?71.1?kg? WT:?71.1?kg? BMI:?24.03?  Gen: well-developed,?thin?6-year-old -American male in no acute distress  HEENT:?Poor dentition.? Oral mucosa benign. Mucus membranes moist.  Neck: No JVD or carotid bruits. No thyromegaly. No lymphadenopathy.  Heart: RRR, no murmurs, gallops, clicks or rubs.  Lungs: CTAB without rales, wheezes or rhonchi. Normal work of breathing. Chest rise symmetrical on inspiration.  Abd: Soft, non-tender, non-distended and without guarding. Bowel sounds present x4 quadrants.  Extremities: Pedal pulses diminished bilaterally. ?Radial 2+ bilaterally, no LE edema.  MSK: No obvious deformities. Moves all extremities purposefully.  Neuro: Responds well to commands.  Skin: Warm, dry and without rashes.  Assessment/Plan  1.?HIV disease  -CD4 count?50 in January 2018  -Continue Descovy and Tivicay  -Continue Bactrim  -CD4 count, viral load, CBC, CMP today  2.?Cryptococcus  -Continue fluconazole  3.?Esophagitis  ?-Diagnosed?per EGD?in July 2017  -Continue PPI  -May need repeat scopes?in future  4.?Crack cocaine use  -Stressed the need for?cessation  -Patient last used?2 days ago  5. Macrocytic Anemia  -Likely B12 deficiency -B12?has been low in the past  -Ordered?B12 level and folate level-test results  ?  Medications have been refilled.? Patient?was given?medication pictures?and told to learn the names of his medications.? All other medications are reviewed with patient?patient reports compliance.  ?  Return to clinic in 3 months.   Problem List/Past Medical History  Ongoing  Acute disease or injury-related malnutrition  GERD - Gastro-esophageal reflux disease  Hepatitis C  Tobacco user  Tobacco  user  Historical  Alcoholism  Closed head injury  Cocaine abuse  Hepatitis C  HIV  HIV  HTN (hypertension)  Obstruction of esophagus  Procedure/Surgical History  Esophagogastroduodenoscopy, flexible, transoral; with biopsy, single or multiple (05/04/2018), Esophagogastroduodenoscopy, flexible, transoral; with removal of foreign body(s) (05/04/2018), Esophagogastroduodenoscopy, flexible, transoral; with removal of foreign body(s) (04/24/2018), Esophagogastroduodenoscopy, flexible, transoral; with removal of foreign body(s) (04/23/2018), Esophagogastroduodenoscopy, flexible, transoral; with removal of foreign body(s) (11/01/2017), Colonoscopy (10/30/2017), Colonoscopy, flexible; diagnostic, including collection of specimen(s) by brushing or washing, when performed (separate procedure) (10/30/2017), Inspection of Lower Intestinal Tract, Via Natural or Artificial Opening Endoscopic (10/30/2017), Transfusion of Nonautologous Red Blood Cells into Peripheral Vein, Percutaneous Approach (08/20/2017), Inspection of Lower Intestinal Tract, Via Natural or Artificial Opening Endoscopic (07/31/2017), Sigmoidoscopy (07/31/2017), Esophagogastroduodenoscopy (07/24/2017), Irrigation of Upper GI using Irrigating Substance, Via Natural or Artificial Opening Endoscopic (07/24/2017), Transfusion of Nonautologous Red Blood Cells into Peripheral Vein, Percutaneous Approach (07/24/2017), Insertion of Infusion Device into Superior Vena Cava, Percutaneous Approach (07/21/2017), Ultrasonography of Superior Vena Cava, Guidance (07/21/2017), Drainage of Left Lower Arm Skin, External Approach (07/20/2017), Incision & Drainage (Left) (07/20/2017), Excision of Right Upper Leg Skin, External Approach, Diagnostic (07/17/2017), Drainage of Spinal Canal, Percutaneous Approach, Diagnostic (07/15/2017), Biopsy Gastrointestinal (07/11/2017), Esophagogastroduodenoscopy (07/11/2017), Excision of Esophagus, Via Natural or Artificial Opening Endoscopic,  Diagnostic (07/11/2017), Excision of Stomach, Via Natural or Artificial Opening Endoscopic, Diagnostic (07/11/2017), DENIES, egd.  Medications  amitriptyline 25 mg oral tablet, 25 mg= 1 tab(s), Oral, Once a day (at bedtime), 5 refills  buffered lidocaine 2% - 0.5 ml syringe, 10 mg= 0.5 mL, Subcutaneous, As Directed  Descovy 200 mg-25 mg oral tablet, 1 tab(s), Oral, Daily, 5 refills  fluconazole 200 mg oral tablet, 200 mg= 1 tab(s), Oral, Daily, 5 refills  IVF Normal Saline NS Infusion 1,000 mL, 1000 mL, IV  Pneumovax 23, 0.5 mL, IM, Once-Unscheduled  Protonix 40 mg ORAL enteric coated tablet, 40 mg= 1 tab(s), Oral, BID, 5 refills  sulfamethoxazole-trimethoprim 800 mg-160 mg oral tablet, 1 tab(s), Oral, Daily, 5 refills  Tivicay 50 mg oral tablet, 50 mg= 1 tab(s), Oral, Daily, 5 refills  Allergies  No Known Allergies  Social History  Alcohol  Current, Beer, Wine, 1-2 times per month, 07/12/2018  Employment/School  DISABLED, 01/25/2018  Exercise  Exercise duration: 30. Exercise frequency: 3-4 times/week., 01/25/2018  Home/Environment  Lives with Alone., 10/12/2017  Nutrition/Health  Regular, 10/12/2017  Sexual  Sexually active: No., 10/12/2017  Substance Abuse  Current, Cocaine, 07/12/2018  Tobacco  Current every day smoker Use:. Cigarettes Type:. 5 per day. Started age 18 Years., 07/12/2018  Family History  Cirrhosis of liver: Father and Sister.  Diabetes mellitus type 2: Mother and Brother.  Health Maintenance  Health Maintenance  ???Pending?(in the next year)  ??? ??Due?  ??? ? ? ?Alcohol Misuse Screening due??07/12/18??and every 1??year(s)  ??? ? ? ?Aspirin Therapy for CVD Prevention due??07/12/18??and every 1??year(s)  ??? ? ? ?Tetanus Vaccine due??07/12/18??and every 10??year(s)  ??? ? ? ?Zoster Vaccine due??07/12/18??and every 100??year(s)  ??? ??Due In Future?  ??? ? ? ?Influenza Vaccine not due until??10/02/18??and every 1??year(s)  ???Satisfied?(in the past 1 year)  ??? ??Satisfied?  ??? ? ? ?Blood Pressure  Screening on??07/12/18.??Satisfied by Azalia Sotelo RN  ??? ? ? ?Body Mass Index Check on??07/12/18.??Satisfied by Azalia Sotelo RN  ??? ? ? ?Colorectal Screening on??10/30/17.  ??? ? ? ?Depression Screening on??07/12/18.??Satisfied by Azalia Sotelo RN  ??? ? ? ?Diabetes Maintenance-HgbA1c on??07/12/18.??Satisfied by Lew Campbell MD  ??? ? ? ?Diabetes Screening on??07/12/18.??Satisfied by Lew Campbell MD  ??? ? ? ?Hypertension Management-Blood Pressure on??07/12/18.??Satisfied by Azalia Sotelo RN  ??? ? ? ?Obesity Screening on??07/12/18.??Satisfied by Azalia Sotelo RN  ??? ? ? ?Smoking Cessation on??07/12/18.??Satisfied by Azalia Sotelo RN  ??? ? ? ?Tobacco Use Screening on??07/12/18.??Satisfied by Azalia Sotelo RN  ?  ?  Lab Results  Test Name Test Result Date/Time Comments   Sodium Lvl 139 mmol/L 04/24/2018 15:30 CDT    Potassium Lvl 4.11 mmol/L 04/24/2018 15:30 CDT    Chloride 108 mmol/L (High) 04/24/2018 15:30 CDT    CO2 26 mmol/L 04/24/2018 15:30 CDT    Calcium Lvl 9.3 mg/dL 04/24/2018 15:30 CDT    Glucose Lvl 82 mg/dL 04/24/2018 15:30 CDT    BUN 21.0 mg/dL (High) 04/24/2018 15:30 CDT    Creatinine 1.83 mg/dL (High) 04/24/2018 15:30 CDT    eGFR-AA 48 mL/min/1.73 m2 04/24/2018 15:30 CDT Population Mean GFR:<br/>116 mL/min/1.73 m2 for patients 20 - 29 years old<br/>107 mL/min/1.73 m2 for patients 30 - 39 years old<br/>99 mL/min/1.73 m2 for patients 40 - 49 years old<br/>93 mL/min/1.73 m2 for patients 50 - 59 years old<br/>85 mL/min/1.73 m2 for patients 60 - 69 years old<br/>75 mL/min/1.73 m2 for patients 70 + years<br/><br/>Chronic Kidney disease: &nbsp;Less than 60 mL/min/1.73 m2<br/>End Stage Renal disease: Less than 15 mL/min/1.73 m2   eGFR-MARKEL 40 mL/min/1.73 m2 04/24/2018 15:30 CDT Population Mean GFR:<br/>116 mL/min/1.73 m2 for patients 20 - 29 years old<br/>107 mL/min/1.73 m2 for patients 30 - 39 years old<br/>99 mL/min/1.73 m2 for patients 40 - 49 years old<br/>93  mL/min/1.73 m2 for patients 50 - 59 years old<br/>85 mL/min/1.73 m2 for patients 60 - 69 years old<br/>75 mL/min/1.73 m2 for patients 70 + years<br/><br/>Chronic Kidney disease: &nbsp;Less than 60 mL/min/1.73 m2<br/>End Stage Renal disease: Less than 15 mL/min/1.73 m2   eGFR 40 mL/min/1.73 m2 04/24/2018 15:30 CDT    Bili Total 0.2 mg/dL 04/24/2018 15:30 CDT    Bili Direct 0.1 mg/dL 04/24/2018 15:30 CDT    Bili Indirect 0.1 mg/dL (Low) 04/24/2018 15:30 CDT    AST 27 unit/L 04/24/2018 15:30 CDT    ALT 17 unit/L 04/24/2018 15:30 CDT    Alk Phos 102 unit/L 04/24/2018 15:30 CDT    Total Protein 8.9 gm/dL (High) 04/24/2018 15:30 CDT    Albumin Lvl 3.4 gm/dL 04/24/2018 15:30 CDT    Globulin 5.5 gm/dL (High) 04/24/2018 15:30 CDT    A/G Ratio 0.62 mg/dL (Low) 04/24/2018 15:30 CDT    Vit D 25 OH 24.14 ng/mL (Low) 01/25/2018 15:04 CST Vit D 25 Hydroxy Reference Range:<br/>0 - 17 years - &nbsp; &nbsp; Deficiency: &nbsp; &nbsp; &nbsp; &nbsp; less than 20 ng/ml<br/> &nbsp; &nbsp; &nbsp; &nbsp; Optimum level: &nbsp; &gt; or = 20 ng/ml<br/>18 yrs or older: &nbsp;Deficiency: &nbsp; &nbsp; &nbsp; &nbsp; less than 20 ng/ml<br/> &nbsp; &nbsp; &nbsp; &nbsp; &nbsp; &nbsp; &nbsp; &nbsp; &nbsp; &nbsp; &nbsp; &nbsp; &nbsp;Insufficiency: &nbsp; &nbsp; 20 - -29 ng/ml<br/> &nbsp; &nbsp; &nbsp; &nbsp; &nbsp; &nbsp; &nbsp; &nbsp; &nbsp; &nbsp; &nbsp; &nbsp; &nbsp;Optimum level: &nbsp;30 - 80 ng/ml<br/> &nbsp; &nbsp; &nbsp; &nbsp; &nbsp; &nbsp; &nbsp; &nbsp; &nbsp; &nbsp; &nbsp; &nbsp; &nbsp;Possible toxicity: &gt; or = 150 ng/ml   PT 12.3 second(s) 04/24/2018 15:30 CDT    INR 1.04 04/24/2018 15:30 CDT INR Therapeutic Reference Range (2.0 - 3.0)   PTT 31.7 second(s) 04/24/2018 15:30 CDT APTT - Therapeutic Range (46 - 70 Sec.)   WBC 4.9 x10(3)/mcL 04/24/2018 15:30 CDT    RBC 3.34 x10(6)/mcL (Low) 04/24/2018 15:30 CDT    Hgb 11.0 gm/dL (Low) 04/24/2018 15:30 CDT    Hct 33.7 % (Low) 04/24/2018 15:30 CDT    Platelet 204 x10(3)/mcL 04/24/2018 15:30  CDT    .9 fL (High) 04/24/2018 15:30 CDT    MCH 32.9 pg (High) 04/24/2018 15:30 CDT    MCHC 32.6 gm/dL (Low) 04/24/2018 15:30 CDT    RDW 14.4 % (High) 04/24/2018 15:30 CDT    MPV 11.7 fL (High) 04/24/2018 15:30 CDT    Abs Neut 2.8 x10(3)/mcL 04/24/2018 15:30 CDT    Neutro Auto 57.3 % 04/24/2018 15:30 CDT    Lymph Auto 24.5 % 04/24/2018 15:30 CDT    Mono Auto 12.1 % (High) 04/24/2018 15:30 CDT    Eos Auto 4.7 % 04/24/2018 15:30 CDT    Abs Eos 0.2 x10(3)/mcL 04/24/2018 15:30 CDT    Basophil Auto 1.0 % 04/24/2018 15:30 CDT    Abs Neutro 2.8 x10(3)/mcL 04/24/2018 15:30 CDT    Abs Lymph 1.2 x10(3)/mcL 04/24/2018 15:30 CDT    Abs Mono 0.6 x10(3)/mcL 04/24/2018 15:30 CDT    Abs Baso 0.0 x10(3)/mcL 04/24/2018 15:30 CDT    IG% 0.4 % 04/24/2018 15:30 CDT    IG# 0 04/24/2018 15:30 CDT    WBC Absolute 6400.0 /mm3 01/25/2018 15:04 CST    Lymph Absolute 1664 unit/L 01/25/2018 15:04 CST    Lymph Percent 26 % (Low) 01/25/2018 15:04 CST    CD4 Pct 9.0 % (Low) 01/25/2018 15:04 CST    CD4 Absolute 150 unit/L (Low) 01/25/2018 15:04 CST        Patient seen and examined with resident. Agree with documented physical exam. Treatment plan reviewed and discussed with resident and is reasonable and appropriate.  ?  MD Dl  ID Staff

## 2022-05-03 NOTE — HISTORICAL [facility] CERNER
This is a historical note converted from [place]. Formatting and pictures may have been removed.  Please reference Sierra for original formatting and attached multimedia. Chief Complaint  B20 follow up  History of Present Illness  Mr Glover is a 65 year old gentleman with PMH of HTN, HIV, Prior Hepatitis C (treated), GERD, CKD Stage III, Prior Cryptococcal Meningitis (completed treatment), Crack Use who presented to the [facility] for a routine followup. He stated that he was doing well and had no new complaints. He reported being compliant with all his home prescription medications and stated that he had been out of Descovy for a few days but otherwise had regularly taken his HIV medications.  ?  Of note, he underwent Total Hip Replacement on the R side for Avascular Necrosis of R Femoral Head IN 01/2021. He is scheduled for L Total Hip Replacement in 01/2022. Will refill his medications, including Tivicay and Descovy. Will order CBC, CMP, T Spot, HIV VL, CD4 count, HCV VL, Hepatitis Panel, UA, Urine Pr/Cr Ratio, Urine GC/CT NAAT. Continue to smoke crack. Has been vaccinated against COVID.  Review of Systems  Constitutional: no fever, no chills, no weight loss, no night sweats, no fatigue or weakness present  ENMT: no ear pain or sore throat, no nasal congestion, no nasal discharge, no sinus pain  Respiratory: no cough,?no shortness of breath or wheezing, no orthopnea, no PND, no sputum production  Cardiovascular: no chest pain, no palpitations or syncope, no orthopnea, no nocturnal dyspnea  Gastrointestinal: no abdominal pain, no epigastric burning, no nausea/vomiting, no diarrhea or constipation, no hematochezia/melena  Genitourinary: no dysuria, no urinary frequency, no urinary urgency or hematuria  Musculoskeletal: no myalgias or back pain, no joint pain, no joint swelling  Physical Exam  Vitals & Measurements  T:?36.9? ?C (Oral)? HR:?99(Peripheral)? RR:?18? BP:?127/87?  HT:?173.00?cm? WT:?86.450?kg?  BMI:?28.89?  General: Alert?male sitting comfortably in clinic, in no acute distress  HENT: oral and oropharyngeal mucosa moist, pink, with no erythema or exudates, no ear pain or discharge  Neck: normal neck movement,?no lymph nodes or swellings, no JVD or Carotid bruit  Respiratory: clear breathing sounds bilaterally, no crackles, rales, rhonchi or wheezes  Cardiovascular: clear S1 and S2, no murmurs, rubs or gallops  Peripheral Vascular: no lesions, ulcers or erosions, normal peripheral pulses and no pedal edema  Gastrointestinal: soft, non-tender, non-distended abdomen, no guarding, rigidity or rebound tenderness, normal bowel sounds  Integumentary: normal skin color, no rashes or lesions  Assessment/Plan  HIV Disease  Patient currently on Tivicay 50 mg and Descovy 200-25 mg daily  Last CD4 count was 354 in 10/2020 and VL was undetectable  Will re-order CD4 count & HIV VL  Will order CBC, CMP, T Spot, UA, Urine Pr/Cr Ratio, Urine GC/CT NAAT  ?   HCV  Patient has been treated for Hep C previously  Will order Hep Panel and HCV VL  Noted transaminitis on CMP in 06/2021; likely from drug use  ?   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   Avascular Necrosis R Hip  Underwent THR on R side in 01/2021  ?  HTN  On Amlodipine 5 mg  ?  Hypothyroid  On Levothyroxine 75 mcg  ?  RTC 4 months  ?  ID ATTENDING ADDENDUM  Patient seen and examined with resident. ?Agree with documented physical exam. ?Treatment plan reviewed and discussed with resident and is reasonable and appropriate.???   Problem List/Past Medical History  Ongoing  Acute disease or injury-related malnutrition  Avascular necrosis of right femoral head  Avascular necrosis of the head of femur  Chronic kidney disease  CKD (chronic kidney disease)  Degenerative joint disease of right hip  GERD -  Gastro-esophageal reflux disease  H/O: meningitis  History of cryptococcal meningitis  History of total hip arthroplasty  HIV disease  Insomnia  Osteoarthritis of right hip joint  Status post total hip replacement, right  Tobacco user  Tobacco user  Historical  Alcoholism  Closed head injury  Cocaine abuse  Hepatitis C  Hepatitis C  HIV  HIV  HTN (hypertension)  Obstruction of esophagus  Procedure/Surgical History  [facility] Total Hip Arthroplasty (Right) (01/28/2021)  [facility] Total Hip Arthroplasty (Right) (01/28/2021)  Replacement of Right Hip Joint with Synthetic Substitute, Uncemented, Open Approach (01/28/2021)  Robotic Assisted Procedure of Lower Extremity, Open Approach (01/28/2021)  Esophagogastroduodenoscopy, flexible, transoral; diagnostic, including collection of specimen(s) by brushing or washing, when performed (separate procedure) (10/22/2018)  Esophagogastroduodenoscopy, flexible, transoral; with biopsy, single or multiple (05/04/2018)  Esophagogastroduodenoscopy, flexible, transoral; with removal of foreign body(s) (05/04/2018)  Esophagogastroduodenoscopy, flexible, transoral; with removal of foreign body(s) (04/24/2018)  Esophagogastroduodenoscopy, flexible, transoral; with removal of foreign body(s) (04/23/2018)  Esophagogastroduodenoscopy, flexible, transoral; with removal of foreign body(s) (11/01/2017)  Colonoscopy (10/30/2017)  Colonoscopy, flexible; diagnostic, including collection of specimen(s) by brushing or washing, when performed (separate procedure) (10/30/2017)  Inspection of Lower Intestinal Tract, Via Natural or Artificial Opening Endoscopic (10/30/2017)  Transfusion of Nonautologous Red Blood Cells into Peripheral Vein, Percutaneous Approach (08/20/2017)  Inspection of Lower Intestinal Tract, Via Natural or Artificial Opening Endoscopic (07/31/2017)  Sigmoidoscopy (07/31/2017)  Esophagogastroduodenoscopy (07/24/2017)  Irrigation of Upper GI using Irrigating Substance, Via Natural or  Artificial Opening Endoscopic (07/24/2017)  Transfusion of Nonautologous Red Blood Cells into Peripheral Vein, Percutaneous Approach (07/24/2017)  Insertion of Infusion Device into Superior Vena Cava, Percutaneous Approach (07/21/2017)  Ultrasonography of Superior Vena Cava, Guidance (07/21/2017)  Drainage of Left Lower Arm Skin, External Approach (07/20/2017)  Incision & Drainage (Left) (07/20/2017)  Excision of Right Upper Leg Skin, External Approach, Diagnostic (07/17/2017)  Drainage of Spinal Canal, Percutaneous Approach, Diagnostic (07/15/2017)  Biopsy Gastrointestinal (07/11/2017)  Esophagogastroduodenoscopy (07/11/2017)  Excision of Esophagus, Via Natural or Artificial Opening Endoscopic, Diagnostic (07/11/2017)  Excision of Stomach, Via Natural or Artificial Opening Endoscopic, Diagnostic (07/11/2017)  egd   Medications  acetaminophen-hydrocodone 325 mg-7.5 mg oral tablet, 1 tab(s), Oral, q6hr  amitriptyline 50 mg oral tablet, 50 mg= 1 tab(s), Oral, Once a day (at bedtime), 3 refills  amLODIPine 5 mg oral tablet, 5 mg= 1 tab(s), Oral, BID  aspirin 81 mg oral Delayed Release (EC) tablet, 81 mg= 1 tab(s), Oral, BID  buffered lidocaine 2% - 0.5 ml syringe, 10 mg= 0.5 mL, Subcutaneous, As Directed  Descovy 200 mg-25 mg oral tablet, 1 tab(s), Oral, Daily, 6 refills  diclofenac sodium 75 mg oral delayed release tablet, 75 mg= 1 tab(s), Oral, BID, PRN,? ?Not taking  Ensure, See Instructions, 3 refills  ergocalciferol 50,000 intl units (1.25 mg) oral capsule, 10442 IntUnit= 1 cap(s), Oral, qWeek, 2 refills  IVF Normal Saline NS Infusion 1,000 mL, 1000 mL, IV  MiraLax (polyethylene glycol 3350), 17 gm, Oral, Daily,? ?Not taking  Robaxin 750 mg oral tablet, 750 mg= 1 tab(s), Oral, q8hr, PRN,? ?Not taking  Senokot S 50 mg-8.6 mg oral tablet, 2 tab(s), Oral, At Bedtime,? ?Not taking  Synthroid 75 mcg (0.075 mg) oral tablet, 75 mcg= 1 tab(s), Oral, qAM, 2 refills  Tivicay 50 mg oral tablet, 50 mg= 1 tab(s), Oral, Daily,  6 refills  Allergies  No Known Allergies  Social History  Abuse/Neglect  No, No, Yes, 08/05/2021  Alcohol  Current, Beer, 1-2 times per month, Alcohol use interferes with work or home: No. Others hurt by drinking: No. Household alcohol concerns: No., 08/05/2021  Employment/School  DISABLED, 01/25/2018  Exercise  Exercise duration: 15. Exercise frequency: 5-6 times/week. Exercise type: Walking., 11/13/2020  Home/Environment  Lives with Alone., 10/12/2017  Nutrition/Health  Regular, Good, 11/13/2020  Sexual  Sexually active: No., 10/12/2017  Spiritual/Cultural  Non denomination, 11/13/2020  Substance Use  Past, Cocaine, 08/05/2021  Tobacco  4 or less cigarettes(less than 1/4 pack)/day in last 30 days, No, Household tobacco concerns: No., 08/05/2021  Family History  Cirrhosis of liver: Father and Sister.  Diabetes mellitus type 2: Mother and Brother.  Immunizations  Vaccine Date Status   COVID-19 mRNA, LNP-S, PF - Moderna 04/16/2021 Recorded   COVID-19 mRNA, LNP-S, PF - Moderna 03/19/2021 Recorded   influenza virus vaccine, inactivated 10/08/2020 Given   influenza virus vaccine, inactivated 01/21/2020 Given   tetanus/diphtheria/pertussis, acel(Tdap) 01/21/2020 Given   meningococcal conjugate vaccine 01/21/2020 Given   pneumococcal 23-polyvalent vaccine 03/28/2019 Given   influenza virus vaccine, inactivated 12/13/2018 Given   pneumococcal 13-valent conjugate vaccine 12/13/2018 Given   pneumococcal 23-polyvalent vaccine 07/12/2018 Given   influenza virus vaccine, inactivated 01/19/2011 Recorded   influenza virus vaccine, inactivated 12/27/2005 Recorded   Health Maintenance  Health Maintenance  ???Pending?(in the next year)  ??? ??OverDue  ??? ? ? ?Cognitive Screening due??01/02/21??and every 1??year(s)  ??? ??Due?  ??? ? ? ?Abdominal Aortic Aneurysm Screening due??08/05/21??and every 100??year(s)  ??? ? ? ?IPPE due??08/05/21??One-time only  ??? ? ? ?Lung Cancer Screening due??08/05/21??and every 1??year(s)  ??? ? ?  ?Medicare Annual Wellness Exam due??08/05/21??and every 1??year(s)  ??? ? ? ?Zoster Vaccine due??08/05/21??Unknown Frequency  ??? ??Refused?  ??? ? ? ?Smoking Cessation due??01/01/21??and every 1??year(s)  ??? ??Due In Future?  ??? ? ? ?Influenza Vaccine not due until??10/01/21??and every 1??day(s)  ??? ? ? ?ADL Screening not due until??11/13/21??and every 1??year(s)  ??? ? ? ?Obesity Screening not due until??01/01/22??and every 1??year(s)  ??? ? ? ?Advance Directive not due until??01/02/22??and every 1??year(s)  ??? ? ? ?Alcohol Misuse Screening not due until??01/02/22??and every 1??year(s)  ??? ? ? ?Fall Risk Assessment not due until??01/02/22??and every 1??year(s)  ??? ? ? ?Functional Assessment not due until??01/02/22??and every 1??year(s)  ??? ? ? ?Diabetes Screening not due until??01/30/22??and every 1??year(s)  ??? ? ? ?Aspirin Therapy for CVD Prevention not due until??02/01/22??and every 1??year(s)  ??? ? ? ?Hypertension Management-BMP not due until??06/09/22??and every 1??year(s)  ???Satisfied?(in the past 1 year)  ??? ??Satisfied?  ??? ? ? ?ADL Screening on??11/13/20.??Satisfied by Glo Hadley  ??? ? ? ?Advance Directive on??01/28/21.??Satisfied by Gabrielle Bishop RN  ??? ? ? ?Alcohol Misuse Screening on??05/26/21.??Satisfied by Too Nugent  ??? ? ? ?Aspirin Therapy for CVD Prevention on??02/01/21.??Satisfied by Nakita Alexander NP  ??? ? ? ?Blood Pressure Screening on??08/05/21.??Satisfied by Sanjuana Mireles RN  ??? ? ? ?Body Mass Index Check on??08/05/21.??Satisfied by Sanjuana Mireles RN  ??? ? ? ?COPD Maintenance-Spirometry on??02/01/21.??Satisfied by Casie Park RN  ??? ? ? ?Depression Screening on??08/05/21.??Satisfied by Sanjuana Mireles RN.  ??? ? ? ?Diabetes Maintenance-HgbA1c on??01/15/21.??Satisfied by Patty Barakat  ??? ? ? ?Diabetes Screening on??06/09/21.??Satisfied by Mejia Zayas  ??? ? ? ?Fall Risk Assessment on??08/05/21.??Satisfied by Sanjuana Mireles RN  ??? ? ?  ?Functional Assessment on??02/01/21.??Satisfied by Casie Park RN  ??? ? ? ?Hypertension Management-Blood Pressure on??08/05/21.??Satisfied by Sanjuana Mireles RN  ??? ? ? ?Influenza Vaccine on??12/16/20.??Satisfied by Parish Blackwell  ??? ? ? ?Obesity Screening on??08/05/21.??Satisfied by Sanjuana Mireles RN  ??? ??Refused?  ??? ? ? ?Smoking Cessation on??02/26/21.??Recorded by Parish Blackwell??Reason: Patient Refuses  ?

## 2022-05-03 NOTE — HISTORICAL [facility] CERNER
This is a historical note converted from [place]. Formatting and pictures may have been removed.  Please reference Sierra for original formatting and attached multimedia. Chief Complaint  B20 f/u  History of Present Illness  Mr. Glover is a 63 yo male with history of HIV disease who presents today for the following:  ?  1)? HIV:? well controlled, on Descovy/Tivicay and reports 100% compliance to same.? Tells me he is having no issues with his ART, missing no doses, and is not sexually active.? Feels excellent from an HIV standpoint.? Labs reviewed, last CD4 of 323 and undetectable HIV VL in 9.19.? Still on TMP/SMX, though can stop today.  2)? Degenerative joint disease Right Hip:? XR in 2018 with impressive DJD however has yet to see orthopedics or pain management, now using a crutch to walk.? Needs hip replacement and CD4 amenable for same.? Needs referral today.? Will also repeat XR.  3)? Insomnia:? Feels he needs a stronger dose of Elavil Qhs, no a/es to Elavil.  4)? Hepatitis C:? completed 8 weeks Mavyret, needs his SVR-12 labs today.  5)? CKD:? Repeating labs today, denies cp/sob/ha/change sin vision.? Denies urinary symptoms/complaints.? No prostate symptoms.  6)? Hx of Cryptococcal Meningitis:? S/P treatment for same and has been on maintenance fluconazole until CD4 reconstitution.? Last CD4 of 323 and will stop today.  ?  Labs reviewed with him  Review of Systems  Full 14 point ROS performed, all negative except as mentioned in HPI  Physical Exam  Vitals & Measurements  T:?36.7? ?C (Oral)? HR:?120(Peripheral)? RR:?14? BP:?160/111?  HT:?172?cm? WT:?75?kg? BMI:?25.35?  ????PE: Gen: AAOx3 in nad, comfortable  ????HEENT: no thrush  ????Neck: supple  ????CVS: RRR no m/r/g  ????C/L: CTA bilaterally  ????Abd: soft, nt/nd  ????Ext: no c/c/e, walking with crutch  ????Skin: no rashes/nodules noted  Assessment/Plan  1.?Hepatitis C?B19.20  ?SVR-12 labs today, advised to avoid situations which could cause reinfection.  ????Encouraged continued abstinence from alcohol and illicit drugs. No tylenol at doses greater than 2g daily. No sharing needles, razors, nail clippers or razor blades. Counseled regarding safe sex at length, condoms offered.  2.?HIV disease?B20  ?Discussed HIV status at length to include need for 100% medication compliance and adherence and safe sex counseling performed.? Patient voiced understanding to both.? Will check labs today to include cd4, viral load cbc and cmp.? Discussed importance of scheduled followup as well.? Continue Descovy/Tivicay however d/c tmp/smx as CD4 reconstituted.? Repeat labs today.? Vaccines today  3.?CKD (chronic kidney disease)?N18.9  ?Repeat ua and renal function today.? Refer to renal clinic  4.?Degenerative joint disease of right hip?M16.11  ?repeat XR right hip, refer to orthopedics  5.?History of cryptococcal meningitis?Z86.61  ?CD4 reconstituted.? D/C fluconazole.  6.?Immunization due?Z23  ?Menveo, tdap, and flu today  7.?Insomnia?G47.00  ?Increase Elavil to 50mg po qhs  8.?Tobacco user?Z72.0  ?Counseled at length re: cessation, not interested at this time.  Orders:  amitriptyline, 50 mg = 1 tab(s), Oral, Once a day (at bedtime), # 30 tab(s), 3 Refill(s), Pharmacy: [facility], 172, cm, Height/Length Dosing, 01/21/20 13:05:00 CST, 75, kg, Weight Dosing, 01/21/20 13:05:00 CST  dolutegravir, 50 mg = 1 tab(s), Oral, Daily, # 30 tab(s), 6 Refill(s), Pharmacy: [facility], 172, cm, Height/Length Dosing, 01/21/20 13:05:00 CST, 75, kg, Weight Dosing, 01/21/20 13:05:00 CST  emtricitabine-tenofovir, 1 tab(s), Oral, Daily, # 30 tab(s), 6 Refill(s), Pharmacy: [facility], 172, cm, Height/Length Dosing, 01/21/20 13:05:00 CST, 75, kg, Weight Dosing, 01/21/20 13:05:00 CST  ergocalciferol, 50,000 IntUnit = 1 cap(s), Oral, qWeek, # 5 cap(s), 2 Refill(s), Pharmacy: [facility], 172, cm, Height/Length Dosing,  01/21/20 13:05:00 CST, 75, kg, Weight Dosing, 01/21/20 13:05:00 CST   Problem List/Past Medical History  Ongoing  Acute disease or injury-related malnutrition  CKD (chronic kidney disease)  Degenerative joint disease of right hip  GERD - Gastro-esophageal reflux disease  Hepatitis C  History of cryptococcal meningitis  HIV disease  Insomnia  Tobacco user  Tobacco user  Historical  Alcoholism  Closed head injury  Cocaine abuse  Hepatitis C  HIV  HIV  HTN (hypertension)  Obstruction of esophagus  Procedure/Surgical History  Esophagogastroduodenoscopy, flexible, transoral; diagnostic, including collection of specimen(s) by brushing or washing, when performed (separate procedure) (10/22/2018)  Esophagogastroduodenoscopy, flexible, transoral; with biopsy, single or multiple (05/04/2018)  Esophagogastroduodenoscopy, flexible, transoral; with removal of foreign body(s) (05/04/2018)  Esophagogastroduodenoscopy, flexible, transoral; with removal of foreign body(s) (04/24/2018)  Esophagogastroduodenoscopy, flexible, transoral; with removal of foreign body(s) (04/23/2018)  Esophagogastroduodenoscopy, flexible, transoral; with removal of foreign body(s) (11/01/2017)  Colonoscopy (10/30/2017)  Colonoscopy, flexible; diagnostic, including collection of specimen(s) by brushing or washing, when performed (separate procedure) (10/30/2017)  Inspection of Lower Intestinal Tract, Via Natural or Artificial Opening Endoscopic (10/30/2017)  Transfusion of Nonautologous Red Blood Cells into Peripheral Vein, Percutaneous Approach (08/20/2017)  Inspection of Lower Intestinal Tract, Via Natural or Artificial Opening Endoscopic (07/31/2017)  Sigmoidoscopy (07/31/2017)  Esophagogastroduodenoscopy (07/24/2017)  Irrigation of Upper GI using Irrigating Substance, Via Natural or Artificial Opening Endoscopic (07/24/2017)  Transfusion of Nonautologous Red Blood Cells into Peripheral Vein, Percutaneous Approach (07/24/2017)  Insertion of Infusion  Device into Superior Vena Cava, Percutaneous Approach (07/21/2017)  Ultrasonography of Superior Vena Cava, Guidance (07/21/2017)  Drainage of Left Lower Arm Skin, External Approach (07/20/2017)  Incision & Drainage (Left) (07/20/2017)  Excision of Right Upper Leg Skin, External Approach, Diagnostic (07/17/2017)  Drainage of Spinal Canal, Percutaneous Approach, Diagnostic (07/15/2017)  Biopsy Gastrointestinal (07/11/2017)  Esophagogastroduodenoscopy (07/11/2017)  Excision of Esophagus, Via Natural or Artificial Opening Endoscopic, Diagnostic (07/11/2017)  Excision of Stomach, Via Natural or Artificial Opening Endoscopic, Diagnostic (07/11/2017)  DENIES  egd   Medications  amitriptyline 50 mg oral tablet, 50 mg= 1 tab(s), Oral, Once a day (at bedtime), 3 refills  buffered lidocaine 2% - 0.5 ml syringe, 10 mg= 0.5 mL, Subcutaneous, As Directed  Descovy 200 mg-25 mg oral tablet, 1 tab(s), Oral, Daily, 6 refills  ergocalciferol 50,000 intl units (1.25 mg) oral capsule, 04582 IntUnit= 1 cap(s), Oral, qWeek, 2 refills  IVF Normal Saline NS Infusion 1,000 mL, 1000 mL, IV  Pantoprazole 40 mg ORAL EC-Tablet, 40 mg= 1 tab(s), Oral, BID  Tivicay 50 mg oral tablet, 50 mg= 1 tab(s), Oral, Daily, 6 refills  Allergies  No Known Allergies  Social History  Abuse/Neglect  No, 09/05/2019  Alcohol  Current, Beer, Wine, 1-2 times per month, 07/12/2018  Employment/School  DISABLED, 01/25/2018  Exercise  Exercise duration: 30. Exercise frequency: 3-4 times/week., 01/25/2018  Home/Environment  Lives with Alone., 10/12/2017  Nutrition/Health  Regular, 10/12/2017  Sexual  Sexually active: No., 10/12/2017  Substance Use  Current, Cocaine, 07/12/2018  Tobacco  10 or more cigarettes (1/2 pack or more)/day in last 30 days, No, 09/05/2019  Family History  Cirrhosis of liver: Father and Sister.  Diabetes mellitus type 2: Mother and Brother.  Immunizations  Vaccine Date Status   pneumococcal 23-polyvalent vaccine 03/28/2019 Given   influenza virus  vaccine, inactivated 12/13/2018 Given   pneumococcal 13-valent conjugate vaccine 12/13/2018 Given   pneumococcal 23-polyvalent vaccine 07/12/2018 Given   Health Maintenance  Health Maintenance  ???Pending?(in the next year)  ??? ??OverDue  ??? ? ? ?Diabetes Screening due??and every?  ??? ??Due?  ??? ? ? ?Alcohol Misuse Screening due??01/01/20??and every 1??year(s)  ??? ? ? ?Aspirin Therapy for CVD Prevention due??01/21/20??and every 1??year(s)  ??? ? ? ?Lung Cancer Screening due??01/21/20??and every 1??year(s)  ??? ? ? ?Tetanus Vaccine due??01/21/20??and every 10??year(s)  ??? ? ? ?Zoster Vaccine due??01/21/20??and every 100??year(s)  ??? ??Due In Future?  ??? ? ? ?Hypertension Management-BMP not due until??09/04/20??and every 1??year(s)  ??? ? ? ?ADL Screening not due until??09/05/20??and every 1??year(s)  ??? ? ? ?Obesity Screening not due until??01/01/21??and every 1??year(s)  ??? ? ? ?Smoking Cessation not due until??01/01/21??and every 1??year(s)  ??? ? ? ?Blood Pressure Screening not due until??01/20/21??and every 1??year(s)  ??? ? ? ?Body Mass Index Check not due until??01/20/21??and every 1??year(s)  ??? ? ? ?Depression Screening not due until??01/20/21??and every 1??year(s)  ??? ? ? ?Hypertension Management-Blood Pressure not due until??01/20/21??and every 1??year(s)  ???Satisfied?(in the past 1 year)  ??? ??Satisfied?  ??? ? ? ?ADL Screening on??09/05/19.??Satisfied by Mello Du LPN  ??? ? ? ?Blood Pressure Screening on??01/21/20.??Satisfied by Vannessa Booth RN  ??? ? ? ?Body Mass Index Check on??01/21/20.??Satisfied by Vannessa Booth RN  ??? ? ? ?Depression Screening on??01/21/20.??Satisfied by Vannessa Booth RN  ??? ? ? ?Diabetes Maintenance-Fasting Lipid Profile on??04/25/19.??Satisfied by Contributor_system, [facility]  ??? ? ? ?Diabetes Screening on??09/05/19.??Satisfied by Kalie Hallman  ??? ? ? ?Hypertension Management-Blood Pressure on??01/21/20.??Satisfied by Vannessa Booth RN  RISHI  ??? ? ? ?Lipid Screening on??04/25/19.??Satisfied by Contributor_system, [facility]  ??? ? ? ?Obesity Screening on??01/21/20.??Satisfied by Vannessa Booth RN  ??? ? ? ?Smoking Cessation on??01/21/20.??Satisfied by Vannessa Booth RN??Reason: Expectation Satisfied Elsewhere  ?

## 2022-05-05 DIAGNOSIS — M16.12 OSTEOARTHRITIS OF LEFT HIP, UNSPECIFIED OSTEOARTHRITIS TYPE: Primary | ICD-10-CM

## 2022-05-12 ENCOUNTER — OFFICE VISIT (OUTPATIENT)
Dept: INFECTIOUS DISEASES | Facility: CLINIC | Age: 67
End: 2022-05-12
Payer: MEDICARE

## 2022-05-12 VITALS
HEIGHT: 69 IN | WEIGHT: 195 LBS | RESPIRATION RATE: 14 BRPM | SYSTOLIC BLOOD PRESSURE: 150 MMHG | HEART RATE: 88 BPM | TEMPERATURE: 99 F | BODY MASS INDEX: 28.88 KG/M2 | DIASTOLIC BLOOD PRESSURE: 90 MMHG

## 2022-05-12 DIAGNOSIS — Z11.3 ROUTINE SCREENING FOR STI (SEXUALLY TRANSMITTED INFECTION): ICD-10-CM

## 2022-05-12 DIAGNOSIS — Z72.0 TOBACCO USE: ICD-10-CM

## 2022-05-12 DIAGNOSIS — E55.9 HYPOVITAMINOSIS D: ICD-10-CM

## 2022-05-12 DIAGNOSIS — Z86.19 HEPATITIS C VIRUS INFECTION CURED AFTER ANTIVIRAL DRUG THERAPY: ICD-10-CM

## 2022-05-12 DIAGNOSIS — Z86.19 HISTORY OF CRYPTOCOCCAL MENINGITIS: ICD-10-CM

## 2022-05-12 DIAGNOSIS — I10 HYPERTENSION, UNSPECIFIED TYPE: ICD-10-CM

## 2022-05-12 DIAGNOSIS — Z86.19 HISTORY OF SYPHILIS: ICD-10-CM

## 2022-05-12 DIAGNOSIS — N52.9 ERECTILE DYSFUNCTION, UNSPECIFIED ERECTILE DYSFUNCTION TYPE: ICD-10-CM

## 2022-05-12 DIAGNOSIS — N18.9 CHRONIC KIDNEY DISEASE, UNSPECIFIED CKD STAGE: ICD-10-CM

## 2022-05-12 DIAGNOSIS — F14.90 COCAINE USE: ICD-10-CM

## 2022-05-12 DIAGNOSIS — B20 HIV DISEASE: Primary | ICD-10-CM

## 2022-05-12 LAB
ALBUMIN SERPL-MCNC: 3.8 GM/DL (ref 3.4–4.8)
ALBUMIN/GLOB SERPL: 0.8 RATIO (ref 1.1–2)
ALP SERPL-CCNC: 106 UNIT/L (ref 40–150)
ALT SERPL-CCNC: 40 UNIT/L (ref 0–55)
AST SERPL-CCNC: 43 UNIT/L (ref 5–34)
BASOPHILS # BLD AUTO: 0.08 X10(3)/MCL (ref 0–0.2)
BASOPHILS NFR BLD AUTO: 1.8 %
BILIRUBIN DIRECT+TOT PNL SERPL-MCNC: 0.2 MG/DL
BUN SERPL-MCNC: 21.3 MG/DL (ref 8.4–25.7)
CALCIUM SERPL-MCNC: 10.2 MG/DL (ref 8.8–10)
CHLORIDE SERPL-SCNC: 106 MMOL/L (ref 98–107)
CO2 SERPL-SCNC: 23 MMOL/L (ref 23–31)
CREAT SERPL-MCNC: 1.62 MG/DL (ref 0.73–1.18)
DEPRECATED CALCIDIOL+CALCIFEROL SERPL-MC: 32.8 NG/ML (ref 30–80)
EOSINOPHIL # BLD AUTO: 0.32 X10(3)/MCL (ref 0–0.9)
EOSINOPHIL NFR BLD AUTO: 7.3 %
ERYTHROCYTE [DISTWIDTH] IN BLOOD BY AUTOMATED COUNT: 14.2 % (ref 11.5–17)
GLOBULIN SER-MCNC: 4.8 GM/DL (ref 2.4–3.5)
GLUCOSE SERPL-MCNC: 88 MG/DL (ref 82–115)
HCT VFR BLD AUTO: 42.7 % (ref 42–52)
HGB BLD-MCNC: 13.9 GM/DL (ref 14–18)
IMM GRANULOCYTES # BLD AUTO: 0.04 X10(3)/MCL (ref 0–0.02)
IMM GRANULOCYTES NFR BLD AUTO: 0.9 % (ref 0–0.43)
LYMPHOCYTES # BLD AUTO: 1.72 X10(3)/MCL (ref 0.6–4.6)
LYMPHOCYTES NFR BLD AUTO: 39.4 %
MCH RBC QN AUTO: 31 PG (ref 27–31)
MCHC RBC AUTO-ENTMCNC: 32.6 MG/DL (ref 33–36)
MCV RBC AUTO: 95.3 FL (ref 80–94)
MONOCYTES # BLD AUTO: 0.53 X10(3)/MCL (ref 0.1–1.3)
MONOCYTES NFR BLD AUTO: 12.2 %
NEUTROPHILS # BLD AUTO: 1.7 X10(3)/MCL (ref 2.1–9.2)
NEUTROPHILS NFR BLD AUTO: 38.4 %
NRBC BLD AUTO-RTO: 0 %
PLATELET # BLD AUTO: 271 X10(3)/MCL (ref 130–400)
PMV BLD AUTO: 11.6 FL (ref 9.4–12.4)
POTASSIUM SERPL-SCNC: 4.3 MMOL/L (ref 3.5–5.1)
PROT SERPL-MCNC: 8.6 GM/DL (ref 5.8–7.6)
RBC # BLD AUTO: 4.48 X10(6)/MCL (ref 4.7–6.1)
SODIUM SERPL-SCNC: 140 MMOL/L (ref 136–145)
T PALLIDUM AB SER QL: ABNORMAL
T PALLIDUM AB SER QL: REACTIVE
WBC # SPEC AUTO: 4.4 X10(3)/MCL (ref 4.5–11.5)

## 2022-05-12 PROCEDURE — 87522 HEPATITIS C REVRS TRNSCRPJ: CPT | Performed by: NURSE PRACTITIONER

## 2022-05-12 PROCEDURE — 86481 TB AG RESPONSE T-CELL SUSP: CPT | Performed by: NURSE PRACTITIONER

## 2022-05-12 PROCEDURE — 99214 PR OFFICE/OUTPT VISIT, EST, LEVL IV, 30-39 MIN: ICD-10-PCS | Mod: S$PBB,,, | Performed by: NURSE PRACTITIONER

## 2022-05-12 PROCEDURE — 99214 OFFICE O/P EST MOD 30 MIN: CPT | Mod: PBBFAC | Performed by: NURSE PRACTITIONER

## 2022-05-12 PROCEDURE — 30000890 GENERAL MISCELLANEOUS TEST (BEAKER): Performed by: NURSE PRACTITIONER

## 2022-05-12 PROCEDURE — 86376 MICROSOMAL ANTIBODY EACH: CPT | Performed by: NURSE PRACTITIONER

## 2022-05-12 PROCEDURE — 87536 HIV-1 QUANT&REVRSE TRNSCRPJ: CPT | Performed by: NURSE PRACTITIONER

## 2022-05-12 PROCEDURE — 85025 COMPLETE CBC W/AUTO DIFF WBC: CPT | Performed by: NURSE PRACTITIONER

## 2022-05-12 PROCEDURE — 86592 SYPHILIS TEST NON-TREP QUAL: CPT | Performed by: NURSE PRACTITIONER

## 2022-05-12 PROCEDURE — 86359 T CELLS TOTAL COUNT: CPT | Performed by: NURSE PRACTITIONER

## 2022-05-12 PROCEDURE — 86780 TREPONEMA PALLIDUM: CPT | Performed by: NURSE PRACTITIONER

## 2022-05-12 PROCEDURE — 30000890 MISC QUEST TESTING: Performed by: NURSE PRACTITIONER

## 2022-05-12 PROCEDURE — 80053 COMPREHEN METABOLIC PANEL: CPT | Performed by: NURSE PRACTITIONER

## 2022-05-12 PROCEDURE — 82306 VITAMIN D 25 HYDROXY: CPT | Performed by: NURSE PRACTITIONER

## 2022-05-12 PROCEDURE — 36415 COLL VENOUS BLD VENIPUNCTURE: CPT | Performed by: NURSE PRACTITIONER

## 2022-05-12 PROCEDURE — 86780 TREPONEMA PALLIDUM: CPT | Mod: 91 | Performed by: NURSE PRACTITIONER

## 2022-05-12 PROCEDURE — 86361 T CELL ABSOLUTE COUNT: CPT | Performed by: NURSE PRACTITIONER

## 2022-05-12 PROCEDURE — 87340 HEPATITIS B SURFACE AG IA: CPT | Performed by: NURSE PRACTITIONER

## 2022-05-12 PROCEDURE — 87491 CHLMYD TRACH DNA AMP PROBE: CPT | Mod: 91 | Performed by: NURSE PRACTITIONER

## 2022-05-12 PROCEDURE — 99214 OFFICE O/P EST MOD 30 MIN: CPT | Mod: S$PBB,,, | Performed by: NURSE PRACTITIONER

## 2022-05-12 RX ORDER — HYDROCODONE BITARTRATE AND ACETAMINOPHEN 7.5; 325 MG/1; MG/1
1 TABLET ORAL EVERY 6 HOURS PRN
Status: ON HOLD | COMMUNITY
End: 2022-08-22 | Stop reason: SDUPTHER

## 2022-05-12 RX ORDER — EMTRICITABINE AND TENOFOVIR ALAFENAMIDE 200; 25 MG/1; MG/1
1 TABLET ORAL DAILY
COMMUNITY
Start: 2022-05-04 | End: 2022-05-12 | Stop reason: SDUPTHER

## 2022-05-12 RX ORDER — OMEPRAZOLE 20 MG/1
20 CAPSULE, DELAYED RELEASE ORAL DAILY
COMMUNITY
Start: 2022-02-27 | End: 2023-01-11 | Stop reason: SDUPTHER

## 2022-05-12 RX ORDER — ERGOCALCIFEROL 1.25 MG/1
50000 CAPSULE ORAL
COMMUNITY
Start: 2022-05-09 | End: 2022-05-12 | Stop reason: SDUPTHER

## 2022-05-12 RX ORDER — DOLUTEGRAVIR SODIUM 50 MG/1
1 TABLET, FILM COATED ORAL DAILY
COMMUNITY
Start: 2022-05-04 | End: 2022-05-12 | Stop reason: SDUPTHER

## 2022-05-12 RX ORDER — ERGOCALCIFEROL 1.25 MG/1
50000 CAPSULE ORAL
Qty: 5 CAPSULE | Refills: 3 | Status: SHIPPED | OUTPATIENT
Start: 2022-05-12 | End: 2023-01-11 | Stop reason: ALTCHOICE

## 2022-05-12 RX ORDER — EMTRICITABINE AND TENOFOVIR ALAFENAMIDE 200; 25 MG/1; MG/1
1 TABLET ORAL DAILY
Qty: 30 TABLET | Refills: 3 | Status: SHIPPED | OUTPATIENT
Start: 2022-05-12 | End: 2022-08-15 | Stop reason: SDUPTHER

## 2022-05-12 RX ORDER — AMITRIPTYLINE HYDROCHLORIDE 50 MG/1
75 TABLET, FILM COATED ORAL NIGHTLY
COMMUNITY
Start: 2022-01-20 | End: 2024-01-25

## 2022-05-12 RX ORDER — AMMONIUM LACTATE 12 G/100G
CREAM TOPICAL 2 TIMES DAILY
COMMUNITY
Start: 2022-04-28

## 2022-05-12 RX ORDER — LEVOTHYROXINE SODIUM 75 UG/1
75 TABLET ORAL DAILY
Status: ON HOLD | COMMUNITY
Start: 2022-04-20 | End: 2022-08-18 | Stop reason: CLARIF

## 2022-05-12 RX ORDER — DOLUTEGRAVIR SODIUM 50 MG/1
1 TABLET, FILM COATED ORAL DAILY
Qty: 30 TABLET | Refills: 3 | Status: SHIPPED | OUTPATIENT
Start: 2022-05-12 | End: 2022-08-15 | Stop reason: SDUPTHER

## 2022-05-12 RX ORDER — AMLODIPINE BESYLATE 5 MG/1
10 TABLET ORAL DAILY
COMMUNITY
End: 2023-01-11 | Stop reason: SDUPTHER

## 2022-05-12 RX ORDER — TADALAFIL 5 MG/1
5 TABLET ORAL DAILY PRN
Qty: 10 TABLET | Refills: 2 | Status: SHIPPED | OUTPATIENT
Start: 2022-05-12 | End: 2022-08-15 | Stop reason: SDUPTHER

## 2022-05-12 NOTE — PROGRESS NOTES
"Subjective:       Patient ID: Renetta Glover is a 66 y.o. male.    Chief Complaint: HIV Positive/AIDS    Mr Glover is a 66 yr old BM here for routine HIV visit.  He has a history of Hep C co-infection in which he was undetectable on last visit.  He is on Descovy + Tivicay with well tolerance.  He states he only misses a pill on rare occasion.  Last sexual encounter about 1 month ago.  Reports only has sex with women and sex is always protected.  Pt reporting issues with erectile dysfunction and requesting Cialis.  Pt with no current complaints.  He has been taking Vitamin D supplement, but states always runs of medication prior to upcoming visit.  Pt is followed by Val Lemon NP for CKD.  Smokes 1/4 ppd.  Still smokes crack "a little bit."     HIV Positive/AIDS    Review of Systems   Constitutional: Negative.  Negative for chills, fatigue and fever.   HENT: Negative.  Negative for nasal congestion, dental problem, ear pain, facial swelling, hearing loss and sore throat.    Eyes: Negative for photophobia, pain, redness and visual disturbance.   Respiratory: Negative for cough, chest tightness and shortness of breath.    Cardiovascular: Negative for chest pain, palpitations and leg swelling.   Gastrointestinal: Negative for abdominal pain, constipation, diarrhea, nausea and vomiting.   Endocrine: Negative for cold intolerance, heat intolerance, polydipsia, polyphagia and polyuria.   Genitourinary: Positive for erectile dysfunction. Negative for dysuria, frequency, genital sores, hematuria and urgency.   Musculoskeletal: Negative for back pain, joint swelling, leg pain, neck pain and neck stiffness.   Integumentary:  Negative for rash and wound. Negative.   Neurological: Negative for dizziness, weakness, numbness and headaches.   Psychiatric/Behavioral: Negative for confusion and hallucinations.         Objective:      Physical Exam  Vitals reviewed.   Constitutional:       General: He is awake. He is not in acute " distress.     Appearance: Normal appearance.   HENT:      Head: Normocephalic and atraumatic.      Mouth/Throat:      Mouth: Mucous membranes are moist.      Pharynx: No oropharyngeal exudate or posterior oropharyngeal erythema.      Comments: No thrush  Eyes:      Conjunctiva/sclera: Conjunctivae normal.      Pupils: Pupils are equal, round, and reactive to light.   Neck:      Comments: No JVD noted  Cardiovascular:      Rate and Rhythm: Normal rate and regular rhythm.      Heart sounds: Normal heart sounds. No murmur heard.    No friction rub. No gallop.   Pulmonary:      Effort: Pulmonary effort is normal. No respiratory distress.      Breath sounds: Normal breath sounds. No wheezing.   Abdominal:      General: Bowel sounds are normal. There is no distension.      Palpations: Abdomen is soft.      Tenderness: There is no abdominal tenderness.   Musculoskeletal:         General: No swelling or deformity. Normal range of motion.      Cervical back: Neck supple.      Right lower leg: No edema.      Left lower leg: No edema.   Skin:     General: Skin is warm and dry.      Capillary Refill: Capillary refill takes less than 2 seconds.      Coloration: Skin is not jaundiced.      Findings: No rash.   Neurological:      General: No focal deficit present.      Mental Status: He is alert and oriented to person, place, and time.   Psychiatric:         Mood and Affect: Mood normal.         Behavior: Behavior normal.       Labs reviewed  Assessment:       Problem List Items Addressed This Visit        ID    History of cryptococcal meningitis    History of syphilis    Relevant Orders    Treponema Pallidum (Syphillis) Antibody    Hepatitis B Surface Antigen      Other Visit Diagnoses     HIV disease    -  Primary    Relevant Medications    DESCOVY 200-25 mg Tab    TIVICAY 50 mg Tab    Other Relevant Orders    CBC Auto Differential    Miscellaneous Test, Sendout T spot    HIV-1 RNA, Quantitative, PCR with Reflex to Genotype     Comprehensive Metabolic Panel    CD4 T-Lima Cells    Hepatitis C virus infection cured after antiviral drug therapy        Relevant Orders    Hepatitis C Viral(HCV) RNA, Quant Real-Time PCR w/Reflexs    Routine screening for STI (sexually transmitted infection)        Relevant Orders    Chlamydia/GC, PCR    Erectile dysfunction, unspecified erectile dysfunction type        Relevant Medications    tadalafiL (CIALIS) 5 MG tablet    Hypovitaminosis D         Relevant Medications    ergocalciferol (ERGOCALCIFEROL) 50,000 unit Cap    Other Relevant Orders    Vitamin D    Hypertension, unspecified type        Chronic kidney disease, unspecified CKD stage        Cocaine use        Tobacco use              Plan:       HIV disease  -     DESCOVY 200-25 mg Tab; Take 1 tablet by mouth once daily.  Dispense: 30 tablet; Refill: 3  -     TIVICAY 50 mg Tab; Take 1 tablet (50 mg total) by mouth once daily.  Dispense: 30 tablet; Refill: 3  -     CBC Auto Differential; Future; Expected date: 05/12/2022  -     Miscellaneous Test, Sendout T spot; Future; Expected date: 05/12/2022  -     HIV-1 RNA, Quantitative, PCR with Reflex to Genotype; Future; Expected date: 05/12/2022  -     Comprehensive Metabolic Panel; Future; Expected date: 05/12/2022  -     CD4 T-Lima Cells; Future; Expected date: 05/12/2022  Discussed HIV status at length to include need for 100% medication compliance and adherence, along with safe sex counseling performed.  Patient voiced understanding to both.  Will check labs today to include CD4, viral load, CBC and CMP.  Discussed importance of scheduled follow up as well.   RTC in 3 months with Destiney MARIE    Hepatitis C virus infection cured after antiviral drug therapy  -     Hepatitis C Viral(HCV) RNA, Quant Real-Time PCR w/Reflexs; Future; Expected date: 05/12/2022  Last HCV VL undetectable  Recheck lab today    History of cryptococcal meningitis  Noted    Routine screening for STI (sexually transmitted  infection)  -     Chlamydia/GC, PCR  Due for GC/CT screening (urine / oral / rectal), along with screening for syphilis and hepatitis  Sexual precautions encouraged    History of syphilis  -     Treponema Pallidum (Syphillis) Antibody; Future; Expected date: 05/12/2022  -     Hepatitis B Surface Antigen; Future; Expected date: 05/12/2022  Last labs with non reactive RPR  Recheck labs today    Erectile dysfunction, unspecified erectile dysfunction type  -     tadalafiL (CIALIS) 5 MG tablet; Take 1 tablet (5 mg total) by mouth daily as needed for Erectile Dysfunction (PRN sexual activity).  Dispense: 10 tablet; Refill: 2  Cialis renally dosed    Hypovitaminosis D   -     ergocalciferol (ERGOCALCIFEROL) 50,000 unit Cap; Take 1 capsule (50,000 Units total) by mouth every 7 days.  Dispense: 5 capsule; Refill: 3  -     Vitamin D; Future; Expected date: 05/12/2022  Last Vitamin D level at 27  Recheck lab today    Hypertension, unspecified type  Recheck BP  Medication compliance stressed  BP goal <130/80.  Monitor BP at home and keep log of readings.  Low sodium diet encouraged; 2 grams Na diet  Weight control recommended  Avoid illicit drug use and excessive ETOH  Increase exercise activity; 30 minutes of strenuous activity 3-5 x week       Chronic kidney disease, unspecified CKD stage  Keep hydrated  Avoid NSAIDs (Advil, Ibuprofen, Aleve, Mobic, etc) and other nephrotoxic agents  Keep BP (goal < 130/80) and cholesterol (LDL < 100) within recommended goals  Low sodium diet encouraged; 2 grams per day  Increase exercise activity; 30 minutes 3-5 x per week  Maintain a healthy weight  Smoking cessation encouraged  Decrease ETOH intake / encouraged cessation   Medication compliance stressed  Keep follow up scheduled appointments with nephrology    Cocaine use  Avoid use    Tobacco use  Pt not ready to quit  Smoking cessation encouraged

## 2022-05-13 LAB — HBV SURFACE AG SERPL QL IA: NONREACTIVE

## 2022-05-15 LAB
HCV RNA SERPL NAA+PROBE-ACNC: NORMAL IU/ML
HIV1 RNA # PLAS NAA DL=20: <20 COPIES/ML

## 2022-05-16 LAB — BEAKER SEE SCANNED REPORT: NORMAL

## 2022-05-17 LAB
AGE: 66
CD3+CD4+ CELLS # BLD: 39 % (ref 28–48)
CD3+CD4+ CELLS # SPEC: 837.41 UNIT/L (ref 589–1505)
CD3+CD4+ CELLS NFR BLD: 48.8 %
LYMPHOCYTES # BLD AUTO: 1716 X10(3)/MCL (ref 1260–5520)
LYMPHOMA - T-CELL MARKERS SPEC-IMP: ABNORMAL
WBC # BLD AUTO: 4400 /MM3 (ref 4500–11500)

## 2022-05-18 LAB
RPR SER QL: ABNORMAL
RPR SER QL: ABNORMAL
RPR SER-TITR: ABNORMAL {TITER}
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
## 2022-05-29 LAB — T PALLIDUM AB SER QL: REACTIVE

## 2022-06-20 ENCOUNTER — ANESTHESIA EVENT (OUTPATIENT)
Dept: SURGERY | Facility: HOSPITAL | Age: 67
End: 2022-06-20
Payer: MEDICARE

## 2022-06-20 ENCOUNTER — ANESTHESIA (OUTPATIENT)
Dept: SURGERY | Facility: HOSPITAL | Age: 67
End: 2022-06-20
Payer: MEDICARE

## 2022-06-20 ENCOUNTER — HOSPITAL ENCOUNTER (EMERGENCY)
Facility: HOSPITAL | Age: 67
Discharge: HOME | End: 2022-06-20
Attending: STUDENT IN AN ORGANIZED HEALTH CARE EDUCATION/TRAINING PROGRAM | Admitting: INTERNAL MEDICINE
Payer: MEDICARE

## 2022-06-20 VITALS
HEART RATE: 95 BPM | DIASTOLIC BLOOD PRESSURE: 73 MMHG | SYSTOLIC BLOOD PRESSURE: 110 MMHG | RESPIRATION RATE: 21 BRPM | WEIGHT: 187 LBS | HEIGHT: 68 IN | OXYGEN SATURATION: 100 % | BODY MASS INDEX: 28.34 KG/M2
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
## 2022-06-20 DIAGNOSIS — R11.2 NAUSEA AND VOMITING, INTRACTABILITY OF VOMITING NOT SPECIFIED, UNSPECIFIED VOMITING TYPE: ICD-10-CM

## 2022-06-20 DIAGNOSIS — T18.128A ESOPHAGEAL OBSTRUCTION DUE TO FOOD IMPACTION: Primary | ICD-10-CM

## 2022-06-20 LAB
ALBUMIN SERPL-MCNC: 4.1 GM/DL (ref 3.4–4.8)
ALBUMIN/GLOB SERPL: 1 RATIO (ref 1.1–2)
ALP SERPL-CCNC: 96 UNIT/L (ref 40–150)
ALT SERPL-CCNC: 22 UNIT/L (ref 0–55)
AST SERPL-CCNC: 25 UNIT/L (ref 5–34)
BASOPHILS # BLD AUTO: 0.06 X10(3)/MCL (ref 0–0.2)
BASOPHILS NFR BLD AUTO: 0.8 %
BILIRUBIN DIRECT+TOT PNL SERPL-MCNC: 0.4 MG/DL
BUN SERPL-MCNC: 22.3 MG/DL (ref 8.4–25.7)
CALCIUM SERPL-MCNC: 9.7 MG/DL (ref 8.8–10)
CHLORIDE SERPL-SCNC: 107 MMOL/L (ref 98–107)
CO2 SERPL-SCNC: 23 MMOL/L (ref 23–31)
CREAT SERPL-MCNC: 2.3 MG/DL (ref 0.73–1.18)
EOSINOPHIL # BLD AUTO: 0.11 X10(3)/MCL (ref 0–0.9)
EOSINOPHIL NFR BLD AUTO: 1.4 %
ERYTHROCYTE [DISTWIDTH] IN BLOOD BY AUTOMATED COUNT: 14.6 % (ref 11.5–17)
GLOBULIN SER-MCNC: 4.1 GM/DL (ref 2.4–3.5)
GLUCOSE SERPL-MCNC: 93 MG/DL (ref 82–115)
HCT VFR BLD AUTO: 40.6 % (ref 42–52)
HGB BLD-MCNC: 13.6 GM/DL (ref 14–18)
IMM GRANULOCYTES # BLD AUTO: 0.04 X10(3)/MCL (ref 0–0.02)
IMM GRANULOCYTES NFR BLD AUTO: 0.5 % (ref 0–0.43)
LYMPHOCYTES # BLD AUTO: 1.48 X10(3)/MCL (ref 0.6–4.6)
LYMPHOCYTES NFR BLD AUTO: 19 %
MCH RBC QN AUTO: 32 PG (ref 27–31)
MCHC RBC AUTO-ENTMCNC: 33.5 MG/DL (ref 33–36)
MCV RBC AUTO: 95.5 FL (ref 80–94)
MONOCYTES # BLD AUTO: 0.7 X10(3)/MCL (ref 0.1–1.3)
MONOCYTES NFR BLD AUTO: 9 %
NEUTROPHILS # BLD AUTO: 5.4 X10(3)/MCL (ref 2.1–9.2)
NEUTROPHILS NFR BLD AUTO: 69.3 %
NRBC BLD AUTO-RTO: 0 %
PLATELET # BLD AUTO: 307 X10(3)/MCL (ref 130–400)
PMV BLD AUTO: 11.1 FL (ref 9.4–12.4)
POTASSIUM SERPL-SCNC: 4.1 MMOL/L (ref 3.5–5.1)
PROT SERPL-MCNC: 8.2 GM/DL (ref 5.8–7.6)
RBC # BLD AUTO: 4.25 X10(6)/MCL (ref 4.7–6.1)
SARS-COV-2 RDRP RESP QL NAA+PROBE: NEGATIVE
SODIUM SERPL-SCNC: 140 MMOL/L (ref 136–145)
WBC # SPEC AUTO: 7.8 X10(3)/MCL (ref 4.5–11.5)

## 2022-06-20 PROCEDURE — 36415 COLL VENOUS BLD VENIPUNCTURE: CPT | Performed by: STUDENT IN AN ORGANIZED HEALTH CARE EDUCATION/TRAINING PROGRAM

## 2022-06-20 PROCEDURE — 80053 COMPREHEN METABOLIC PANEL: CPT | Performed by: STUDENT IN AN ORGANIZED HEALTH CARE EDUCATION/TRAINING PROGRAM

## 2022-06-20 PROCEDURE — 87635 SARS-COV-2 COVID-19 AMP PRB: CPT | Performed by: STUDENT IN AN ORGANIZED HEALTH CARE EDUCATION/TRAINING PROGRAM

## 2022-06-20 PROCEDURE — 27201423 OPTIME MED/SURG SUP & DEVICES STERILE SUPPLY: Performed by: INTERNAL MEDICINE

## 2022-06-20 PROCEDURE — 25000003 PHARM REV CODE 250: Performed by: NURSE ANESTHETIST, CERTIFIED REGISTERED

## 2022-06-20 PROCEDURE — 37000008 HC ANESTHESIA 1ST 15 MINUTES: Performed by: INTERNAL MEDICINE

## 2022-06-20 PROCEDURE — 37000009 HC ANESTHESIA EA ADD 15 MINS: Performed by: INTERNAL MEDICINE

## 2022-06-20 PROCEDURE — 25000003 PHARM REV CODE 250

## 2022-06-20 PROCEDURE — 63600175 PHARM REV CODE 636 W HCPCS: Performed by: NURSE ANESTHETIST, CERTIFIED REGISTERED

## 2022-06-20 PROCEDURE — 99291 CRITICAL CARE FIRST HOUR: CPT | Mod: 25

## 2022-06-20 PROCEDURE — 85025 COMPLETE CBC W/AUTO DIFF WBC: CPT | Performed by: STUDENT IN AN ORGANIZED HEALTH CARE EDUCATION/TRAINING PROGRAM

## 2022-06-20 PROCEDURE — 43247 EGD REMOVE FOREIGN BODY: CPT | Performed by: INTERNAL MEDICINE

## 2022-06-20 RX ORDER — PROPOFOL 10 MG/ML
VIAL (ML) INTRAVENOUS
Status: DISCONTINUED | OUTPATIENT
Start: 2022-06-20 | End: 2022-06-20

## 2022-06-20 RX ORDER — ONDANSETRON 2 MG/ML
INJECTION INTRAMUSCULAR; INTRAVENOUS
Status: DISCONTINUED | OUTPATIENT
Start: 2022-06-20 | End: 2022-06-20

## 2022-06-20 RX ORDER — DEXAMETHASONE SODIUM PHOSPHATE 4 MG/ML
INJECTION, SOLUTION INTRA-ARTICULAR; INTRALESIONAL; INTRAMUSCULAR; INTRAVENOUS; SOFT TISSUE
Status: DISCONTINUED | OUTPATIENT
Start: 2022-06-20 | End: 2022-06-20

## 2022-06-20 RX ORDER — SUCCINYLCHOLINE CHLORIDE 20 MG/ML
INJECTION INTRAMUSCULAR; INTRAVENOUS
Status: DISCONTINUED | OUTPATIENT
Start: 2022-06-20 | End: 2022-06-20

## 2022-06-20 RX ORDER — FENTANYL CITRATE 50 UG/ML
INJECTION, SOLUTION INTRAMUSCULAR; INTRAVENOUS
Status: DISCONTINUED | OUTPATIENT
Start: 2022-06-20 | End: 2022-06-20

## 2022-06-20 RX ADMIN — SUCCINYLCHOLINE CHLORIDE 120 MG: 20 INJECTION, SOLUTION INTRAMUSCULAR; INTRAVENOUS at 09:06

## 2022-06-20 RX ADMIN — PROPOFOL 150 MG: 10 INJECTION, EMULSION INTRAVENOUS at 09:06

## 2022-06-20 RX ADMIN — FENTANYL CITRATE 100 MCG: 50 INJECTION, SOLUTION INTRAMUSCULAR; INTRAVENOUS at 09:06

## 2022-06-20 RX ADMIN — GLYCOPYRROLATE 0.2 MG: 0.2 INJECTION, SOLUTION INTRAMUSCULAR; INTRAVENOUS at 09:06

## 2022-06-20 RX ADMIN — ONDANSETRON 4 MG: 2 INJECTION INTRAMUSCULAR; INTRAVENOUS at 09:06

## 2022-06-20 RX ADMIN — DEXAMETHASONE SODIUM PHOSPHATE 4 MG: 4 INJECTION, SOLUTION INTRA-ARTICULAR; INTRALESIONAL; INTRAMUSCULAR; INTRAVENOUS; SOFT TISSUE at 09:06

## 2022-06-20 NOTE — ED NOTES
Bed: 17  Expected date:   Expected time:   Means of arrival:   Comments:  OGLUAN tx, 66M, food bolus, ETA 3139

## 2022-06-21 NOTE — H&P
"[facility]    CC: food bolus    HPI 66 y.o. male with food bolus.  No previous history.  No other Gi complaints.     Past Medical History  Past Medical History:   Diagnosis Date    Disorder of kidney and ureter     GERD (gastroesophageal reflux disease)     History of cryptococcal meningitis 05/12/2022    History of syphilis 05/12/2022    Hypertension          Review of Systems  General ROS: negative for chills, fever or weight loss  Cardiovascular ROS: no chest pain or dyspnea on exertion  Gastrointestinal ROS: no abdominal pain, change in bowel habits, or black/ bloody stools    Physical Examination  BP (!) 139/95   Pulse 100   Resp 18   Ht 5' 8" (1.727 m)   Wt 84.8 kg (187 lb)   SpO2 96%   BMI 28.43 kg/m²   General appearance: alert, cooperative, no distress  HENT: Normocephalic, atraumatic, neck symmetrical, no nasal discharge   Lungs: clear to auscultation bilaterally, no dullness to percussion bilaterally  Heart: regular rate and rhythm without rub; no displacement of the PMI   Abdomen: soft, non-tender; bowel sounds normoactive; no organomegaly  Extremities: extremities symmetric; no clubbing, cyanosis, or edema  Neurologic: Alert and oriented X 3, normal strength, normal coordination and gait    Labs:  Lab Results   Component Value Date    WBC 7.8 06/20/2022    HGB 13.6 (L) 06/20/2022    HCT 40.6 (L) 06/20/2022    MCV 95.5 (H) 06/20/2022     06/20/2022           Imaging:    I have personally reviewed and interpreted these images.    Assessment:   Food impaction    Plan:    EGD-risk of perforation and bleeding explained to patient.       MEGHAN Tang Jr., M.D.  [facility]    "

## 2022-06-21 NOTE — ANESTHESIA PROCEDURE NOTES
Intubation    Date/Time: 6/20/2022 9:16 PM  Performed by: Chris Herrera CRNA  Authorized by: Noel Mooney MD     Intubation:     Induction:  Intravenous    Intubated:  Postinduction    Mask Ventilation:  Easy with oral airway    Attempts:  1    Attempted By:  CRNA    Method of Intubation:  Direct    Blade:  Aguillon 2    Laryngeal View Grade: Grade IIA - cords partially seen      Difficult Airway Encountered?: No      Complications:  None    Airway Device:  Oral endotracheal tube    Airway Device Size:  7.5    Style/Cuff Inflation:  Cuffed (inflated to minimal occlusive pressure)    Inflation Amount (mL):  6    Tube secured:  21    Secured at:  The lips    Placement Verified By:  Capnometry    Complicating Factors:  None    Findings Post-Intubation:  BS equal bilateral

## 2022-06-21 NOTE — ED PROVIDER NOTES
"Encounter Date: 6/20/2022    SCRIBE #1 NOTE: I, Chanda Norris, am scribing for, and in the presence of,  Timbo Anderson IV, MD. I have scribed the following portions of the note - Other sections scribed: HPI, ROS, and PE.       History     Chief Complaint   Patient presents with    Foreign Body In Throat     [place] [facility] [place]      66 y.o. male with a history of syphilis, GERD, and HTN who presents to the ED as a transfer from [facility] after getting "roast" stuck in his throat last night. Patient notes he has not been able to eat or drink without vomiting. Per transferring physician, the patient showed no response to glucagon and requested transfer here for an endoscopy and GI.       The history is provided by the patient and medical records.   Illness   The current episode started yesterday. The problem occurs continuously. The problem has been unchanged. Nothing relieves the symptoms. The symptoms are aggravated by eating and drinking. Associated symptoms include nausea and vomiting. Pertinent negatives include no fever, no abdominal pain, no congestion, no rhinorrhea, no sore throat, no cough, no shortness of breath and no rash. Services received include tests performed and medications given. Recently, medical care has been given at another facility.     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    History of syphilis 05/12/2022    Hypertension      Past Surgical History:   Procedure Laterality Date    HIP REPLACEMENT ARTHROPLASTY Right 02/02/2021     Family History   Problem Relation Age of Onset    Diabetes Mother     Diabetes Brother      Social History     Tobacco Use    Smoking status: Heavy Tobacco Smoker     Packs/day: 0.50     Years: 46.00     Pack years: 23.00     Types: Cigarettes    " " Start date: 1/1/1976    Smokeless tobacco: Never Used   Substance Use Topics    Alcohol use: Yes     Alcohol/week: 4.0 standard drinks     Types: 4 Cans of beer per week    Drug use: Yes     Types: "Crack" cocaine     Review of Systems   Constitutional: Negative for chills and fever.   HENT: Negative for congestion, rhinorrhea and sore throat.         Food stuck in throat    Eyes: Negative for visual disturbance.   Respiratory: Negative for cough and shortness of breath.    Cardiovascular: Negative for chest pain.   Gastrointestinal: Positive for nausea and vomiting. Negative for abdominal pain.   Genitourinary: Negative for dysuria and hematuria.   Musculoskeletal: Negative for joint swelling.   Skin: Negative for rash.   Neurological: Negative for weakness.   Psychiatric/Behavioral: Negative for confusion.   All other systems reviewed and are negative.      Physical Exam     Initial Vitals [06/20/22 1849]   BP Pulse Resp Temp SpO2   (!) 139/95 100 18 -- 96 %      MAP       --         Physical Exam    Nursing note and vitals reviewed.  Constitutional: He is not diaphoretic. No distress.   Well-appearing  Not tolerating PO      HENT:   Head: Normocephalic and atraumatic.   Eyes: EOM are normal. Pupils are equal, round, and reactive to light.   Neck: Neck supple.   Normal range of motion.  Cardiovascular: Normal rate and regular rhythm.   No murmur heard.  Pulmonary/Chest: Breath sounds normal. No respiratory distress. He has no wheezes. He has no rales.   Abdominal: Abdomen is soft. He exhibits no distension. There is no abdominal tenderness.   vomiting   Musculoskeletal:         General: No edema. Normal range of motion.      Cervical back: Normal range of motion and neck supple.     Neurological: He is alert and oriented to person, place, and time. He has normal strength. No cranial nerve deficit.   Skin: Skin is warm. No rash noted.   Psychiatric: He has a normal mood and affect.         ED Course   Critical " Care    Date/Time: 6/20/2022 7:41 PM  Performed by: Timbo Anderson IV, MD  Authorized by: Timbo Anderson IV, MD   Total critical care time (exclusive of procedural time) : 35 minutes  Critical care time was exclusive of separately billable procedures and treating other patients.  Critical care was necessary to treat or prevent imminent or life-threatening deterioration of the following conditions: Impacted food bolus requiring endoscopy   Critical care was time spent personally by me on the following activities: discussions with consultants, evaluation of patient's response to treatment, obtaining history from patient or surrogate, ordering and review of laboratory studies, review of old charts, development of treatment plan with patient or surrogate, examination of patient, ordering and performing treatments and interventions, ordering and review of radiographic studies and re-evaluation of patient's condition (Emergent endoscopy ).        Labs Reviewed   COMPREHENSIVE METABOLIC PANEL - Abnormal; Notable for the following components:       Result Value    Creatinine 2.30 (*)     Protein Total 8.2 (*)     Globulin 4.1 (*)     Albumin/Globulin Ratio 1.0 (*)     All other components within normal limits   CBC WITH DIFFERENTIAL - Abnormal; Notable for the following components:    RBC 4.25 (*)     Hgb 13.6 (*)     Hct 40.6 (*)     MCV 95.5 (*)     MCH 32.0 (*)     IG# 0.04 (*)     IG% 0.5 (*)     All other components within normal limits   SARS-COV-2 RNA AMPLIFICATION, QUAL - Normal   CBC W/ AUTO DIFFERENTIAL    Narrative:     The following orders were created for panel order CBC auto differential.  Procedure                               Abnormality         Status                     ---------                               -----------         ------                     CBC with Differential[[id number]]        Abnormal            Final result                 Please view results for these tests on the individual orders.  "         Imaging Results    None          Medications - No data to display  Medical Decision Making:   History:   I obtained history from: another health care provider.       <> Summary of History: [place] [facility] [place] for GI   Old Medical Records: I decided to obtain old medical records.  Initial Assessment:   65 yo [place] [facility] [place] bolus - not relieved with glucagon. Failed PO challenge here -0 discussed with GI Dr. Tang on call who will take to GI lab for endoscopy at this time.   Clinical Tests:   Lab Tests: Ordered and Reviewed  ED Management:  IV glucagon   Other:   I have discussed this case with another health care provider.          Scribe Attestation:   Scribe #1: I performed the above scribed service and the documentation accurately describes the services I performed. I attest to the accuracy of the note.        ED Course as of 06/20/22 2036 Mon Jun 20, 2022 1859 Patient is a transfer from [facility]. Reportedly was eating last night and a piece of "roast" got stuck in his throat and since then the patient has not been able to tolerate PO. Patient showed no response to glucagon and was sent here for GI and endoscopy.  [CM]   1901 Patient did not tolerate PO challenge in ED.  [CM]   1929 Paged GI.  [CM]   1937 Phone call with GI - will come evaluate the patient and take to GI lab  [AC]      ED Course User Index  [AC] Timbo Anderson IV, MD  [CM] Chanda Norris             Clinical Impression:   Final diagnoses:  [K22.2, T18.128A] Esophageal obstruction due to food impaction (Primary)  [R11.2] Nausea and vomiting, intractability of vomiting not specified, unspecified vomiting type       I, Timbo Anderson MD personally performed the history, PE, MDM, and procedures as documented above and agree with the scribe's documentation.             Timbo Anderson IV, MD  06/20/22 2037    "

## 2022-06-21 NOTE — PROVATION PATIENT INSTRUCTIONS
Discharge Summary/Instructions after an Endoscopic Procedure  Patient Name: Renetta Glover  Patient MRN: [id number]  Patient YOB: 1955  Monday, June 20, 2022  Emile Tang MD  Dear patient,  As a result of recent federal legislation (The Federal Cures Act), you may   receive lab or pathology results from your procedure in your [facility]   account before your physician is able to contact you. Your physician or   their representative will relay the results to you with their   recommendations at their soonest availability.  Thank you,  RESTRICTIONS:  During your procedure today, you received medications for sedation.  These   medications may affect your judgment, balance and coordination.  Therefore,   for 24 hours, you have the following restrictions:   - DO NOT drive a car, operate machinery, make legal/financial decisions,   sign important papers or drink alcohol.    ACTIVITY:  Today: no heavy lifting, straining or running due to procedural   sedation/anesthesia.  The following day: return to full activity including work.  DIET:  Eat and drink normally unless instructed otherwise.     TREATMENT FOR COMMON SIDE EFFECTS:  - Mild abdominal pain, nausea, belching, bloating or excessive gas:  rest,   eat lightly and use a heating pad.  - Sore Throat: treat with throat lozenges and/or gargle with warm salt   water.  - Because air was used during the procedure, expelling large amounts of air   from your rectum or belching is normal.  - If a bowel prep was taken, you may not have a bowel movement for 1-3 days.    This is normal.  SYMPTOMS TO WATCH FOR AND REPORT TO YOUR PHYSICIAN:  1. Abdominal pain or bloating, other than gas cramps.  2. Chest pain.  3. Back pain.  4. Signs of infection such as: chills or fever occurring within 24 hours   after the procedure.  5. Rectal bleeding, which would show as bright red, maroon, or black stools.   (A tablespoon of blood from the rectum is not serious, especially  if   hemorrhoids are present.)  6. Vomiting.  7. Weakness or dizziness.  GO DIRECTLY TO THE NEAREST EMERGENCY ROOM IF YOU HAVE ANY OF THE FOLLOWING:      Difficulty breathing              Chills and/or fever over 101 F   Persistent vomiting and/or vomiting blood   Severe abdominal pain   Severe chest pain   Black, tarry stools   Bleeding- more than one tablespoon   Any other symptom or condition that you feel may need urgent attention  Your doctor recommends these additional instructions:  If any biopsies were taken, your doctors clinic will contact you in 1 to 2   weeks with any results.  - Discharge patient to home (ambulatory).   - Resume soft diet  - Avoid NSAIDS  - Resume current medications  For questions, problems or results please call your physician - Emile Tang MD at Work:  [phone].  [facility], EMERGENCY ROOM PHONE NUMBER: [phone]  IF A COMPLICATION OR EMERGENCY SITUATION ARISES AND YOU ARE UNABLE TO REACH   YOUR PHYSICIAN - GO DIRECTLY TO THE EMERGENCY ROOM.  Emile Tang MD  6/20/2022 9:38:00 PM  This report has been verified and signed electronically.  Dear patient,  As a result of recent federal legislation (The Federal Cures Act), you may   receive lab or pathology results from your procedure in your [facility]   account before your physician is able to contact you. Your physician or   their representative will relay the results to you with their   recommendations at their soonest availability.  Thank you,  PROVATION

## 2022-06-21 NOTE — ANESTHESIA POSTPROCEDURE EVALUATION
Anesthesia Post Evaluation    Patient: Renetta Glover    Procedure(s) Performed: Procedure(s) (LRB):  EGD (ESOPHAGOGASTRODUODENOSCOPY) (N/A)  EGD, WITH FOREIGN BODY REMOVAL (N/A)    Final Anesthesia Type: general      Patient location during evaluation: PACU  Patient participation: Yes- Able to Participate  Level of consciousness: awake and alert  Post-procedure vital signs: reviewed and stable  Pain management: adequate  Airway patency: patent  MYLA mitigation strategies: Multimodal analgesia  PONV status at discharge: No PONV  Anesthetic complications: no      Cardiovascular status: blood pressure returned to baseline and hemodynamically stable  Respiratory status: unassisted  Hydration status: euvolemic  Follow-up not needed.          Vitals Value Taken Time   /71 06/20/22 2210   Temp  06/20/22 2248   Pulse 73 06/20/22 2212   Resp 21 06/20/22 2156   SpO2 99 % 06/20/22 2212   Vitals shown include unvalidated device data.      Event Time   Out of Recovery 21:55:58         Pain/Daniela Score: Daniela Score: 9 (6/20/2022  9:46 PM)

## 2022-06-21 NOTE — ANESTHESIA PREPROCEDURE EVALUATION
06/20/2022  Renetta Glover is a 66 y.o., male.      Pre-op Assessment    I have reviewed the Patient Summary Reports.     I have reviewed the Nursing Notes. I have reviewed the NPO Status.   I have reviewed the Medications.     Review of Systems  Social:  Alcohol Use, Smoker Polysubstance abuse   Hematology/Oncology:  Hematology Normal   Oncology Normal     EENT/Dental:EENT/Dental Normal   Cardiovascular:   Hypertension    Pulmonary:  Pulmonary Normal    Renal/:   Chronic Renal Disease, CRI    Hepatic/GI:   GERD    Musculoskeletal:  Musculoskeletal Normal    Neurological:   Recent cryptococcal meningitis.   Endocrine:  Endocrine Normal    Dermatological:  Skin Normal    Psych:   Drug abuse         Physical Exam  General: Well nourished, Oriented and Anxious    Airway:  Mallampati: III   Mouth Opening: Normal  TM Distance: Normal  Neck ROM: Normal ROM    Dental:  Periodontal disease    Chest/Lungs:  Clear to auscultation    Heart:  Rate: Normal        Anesthesia Plan  Type of Anesthesia, risks & benefits discussed:    Anesthesia Type: Gen ETT  Intra-op Monitoring Plan: Standard ASA Monitors  Post Op Pain Control Plan: multimodal analgesia  Induction:  IV and rapid sequence  Airway Plan: Direct  Informed Consent: Informed consent signed with the Patient and all parties understand the risks and agree with anesthesia plan.  All questions answered.   ASA Score: 3 Emergent  Day of Surgery Review of History & Physical: I have interviewed and examined the patient. I have reviewed the patient's H&P dated: There are no significant changes.     Ready For Surgery From Anesthesia Perspective.     .

## 2022-06-21 NOTE — TRANSFER OF CARE
"Anesthesia Transfer of Care Note    Patient: Renetta Glover    Procedure(s) Performed: Procedure(s) (LRB):  EGD (ESOPHAGOGASTRODUODENOSCOPY) (N/A)  EGD, WITH FOREIGN BODY REMOVAL (N/A)    Patient location: PACU    Anesthesia Type: general    Transport from OR: Transported from OR on room air with adequate spontaneous ventilation    Post pain: adequate analgesia    Post assessment: no apparent anesthetic complications    Post vital signs: stable    Level of consciousness: responds to stimulation    Nausea/Vomiting: no nausea/vomiting    Complications: none    Transfer of care protocol was followed      Last vitals:   Visit Vitals  /81   Pulse 93   Resp (!) 23   Ht 5' 8" (1.727 m)   Wt 84.8 kg (187 lb)   SpO2 98%   BMI 28.43 kg/m²     "

## 2022-06-23 DIAGNOSIS — R26.89 IMPAIRED GAIT AND MOBILITY: ICD-10-CM

## 2022-06-23 DIAGNOSIS — K21.9 GASTROESOPHAGEAL REFLUX DISEASE, UNSPECIFIED WHETHER ESOPHAGITIS PRESENT: ICD-10-CM

## 2022-06-23 DIAGNOSIS — Z01.812 PRE-OPERATIVE LABORATORY EXAMINATION: ICD-10-CM

## 2022-06-23 DIAGNOSIS — M16.12 PRIMARY LOCALIZED OSTEOARTHRITIS OF LEFT HIP: Primary | ICD-10-CM

## 2022-06-23 DIAGNOSIS — I10 PRIMARY HYPERTENSION: ICD-10-CM

## 2022-06-23 LAB
M FLAG: NORMAL
M UNIT OF MEASURE: NORMAL
REF LAB TEST NAME: NORMAL
REF LAB TEST REF RANGE: NORMAL
REF LAB TEST RESULTS: NEGATIVE

## 2022-06-23 RX ORDER — GABAPENTIN 100 MG/1
600 CAPSULE ORAL
Status: CANCELLED | OUTPATIENT
Start: 2022-06-23

## 2022-06-23 RX ORDER — KETOROLAC TROMETHAMINE 30 MG/ML
15 INJECTION, SOLUTION INTRAMUSCULAR; INTRAVENOUS
Status: CANCELLED | OUTPATIENT
Start: 2022-06-23 | End: 2022-06-23

## 2022-06-23 RX ORDER — ACETAMINOPHEN 500 MG
1000 TABLET ORAL
Status: CANCELLED | OUTPATIENT
Start: 2022-06-23 | End: 2022-06-23

## 2022-06-23 RX ORDER — SODIUM CHLORIDE 9 MG/ML
INJECTION, SOLUTION INTRAVENOUS CONTINUOUS
Status: CANCELLED | OUTPATIENT
Start: 2022-06-23

## 2022-06-23 RX ORDER — SCOLOPAMINE TRANSDERMAL SYSTEM 1 MG/1
1 PATCH, EXTENDED RELEASE TRANSDERMAL
Status: CANCELLED | OUTPATIENT
Start: 2022-06-23

## 2022-06-23 RX ORDER — TRANEXAMIC ACID 650 MG/1
1950 TABLET ORAL
Status: CANCELLED | OUTPATIENT
Start: 2022-06-23 | End: 2022-06-23

## 2022-06-27 LAB
C TRACH RRNA SPEC QL NAA+PROBE: NEGATIVE
C TRACH RRNA SPEC QL NAA+PROBE: NEGATIVE
N GONORRHOEA RRNA SPEC QL NAA+PROBE: NEGATIVE
N GONORRHOEA RRNA SPEC QL NAA+PROBE: NEGATIVE
SPECIMEN SOURCE: NORMAL
SPECIMEN SOURCE: NORMAL

## 2022-06-30 DIAGNOSIS — M16.12 PRIMARY OSTEOARTHRITIS OF LEFT HIP: Primary | ICD-10-CM

## 2022-08-05 ENCOUNTER — HOSPITAL ENCOUNTER (OUTPATIENT)
Dept: RADIOLOGY | Facility: HOSPITAL | Age: 67
Discharge: HOME | End: 2022-08-05
Attending: SPECIALIST
Payer: MEDICARE

## 2022-08-05 ENCOUNTER — OFFICE VISIT (OUTPATIENT)
Dept: ORTHOPEDICS | Facility: CLINIC | Age: 67
End: 2022-08-05
Payer: MEDICARE

## 2022-08-05 ENCOUNTER — HOSPITAL ENCOUNTER (OUTPATIENT)
Dept: RADIOLOGY | Facility: HOSPITAL | Age: 67
Discharge: HOME | End: 2022-08-05
Attending: PHYSICIAN ASSISTANT
Payer: MEDICARE

## 2022-08-05 VITALS — DIASTOLIC BLOOD PRESSURE: 73 MMHG | HEART RATE: 108 BPM | SYSTOLIC BLOOD PRESSURE: 139 MMHG

## 2022-08-05 DIAGNOSIS — R26.89 IMPAIRED GAIT AND MOBILITY: ICD-10-CM

## 2022-08-05 DIAGNOSIS — M16.12 PRIMARY OSTEOARTHRITIS OF LEFT HIP: Primary | ICD-10-CM

## 2022-08-05 DIAGNOSIS — M16.12 PRIMARY OSTEOARTHRITIS OF LEFT HIP: ICD-10-CM

## 2022-08-05 DIAGNOSIS — Z01.812 PRE-OPERATIVE LABORATORY EXAMINATION: ICD-10-CM

## 2022-08-05 DIAGNOSIS — K21.9 GASTROESOPHAGEAL REFLUX DISEASE, UNSPECIFIED WHETHER ESOPHAGITIS PRESENT: ICD-10-CM

## 2022-08-05 DIAGNOSIS — M16.12 PRIMARY LOCALIZED OSTEOARTHRITIS OF LEFT HIP: ICD-10-CM

## 2022-08-05 DIAGNOSIS — I10 PRIMARY HYPERTENSION: ICD-10-CM

## 2022-08-05 DIAGNOSIS — M16.12 PRIMARY LOCALIZED OSTEOARTHRITIS OF LEFT HIP: Primary | ICD-10-CM

## 2022-08-05 LAB
APPEARANCE UR: CLEAR
BILIRUB UR QL STRIP.AUTO: NEGATIVE MG/DL
COLOR UR AUTO: YELLOW
GLUCOSE UR QL STRIP.AUTO: NEGATIVE MG/DL
KETONES UR QL STRIP.AUTO: NEGATIVE MG/DL
LEUKOCYTE ESTERASE UR QL STRIP.AUTO: NEGATIVE UNIT/L
NITRITE UR QL STRIP.AUTO: NEGATIVE
PH UR STRIP.AUTO: 6 [PH]
PROT UR QL STRIP.AUTO: NEGATIVE MG/DL
RBC UR QL AUTO: NEGATIVE UNIT/L
SP GR UR STRIP.AUTO: 1.01
UROBILINOGEN UR STRIP-ACNC: 0.2 MG/DL

## 2022-08-05 PROCEDURE — 99213 PR OFFICE/OUTPT VISIT, EST, LEVL III, 20-29 MIN: ICD-10-PCS | Mod: ,,, | Performed by: PHYSICIAN ASSISTANT

## 2022-08-05 PROCEDURE — 71046 X-RAY EXAM CHEST 2 VIEWS: CPT | Mod: TC

## 2022-08-05 PROCEDURE — 99213 OFFICE O/P EST LOW 20 MIN: CPT | Mod: ,,, | Performed by: PHYSICIAN ASSISTANT

## 2022-08-05 PROCEDURE — 73700 CT LOWER EXTREMITY W/O DYE: CPT | Mod: TC,LT

## 2022-08-05 RX ORDER — SODIUM CHLORIDE 9 MG/ML
INJECTION, SOLUTION INTRAVENOUS CONTINUOUS
Status: CANCELLED | OUTPATIENT
Start: 2022-08-05

## 2022-08-05 RX ORDER — TRANEXAMIC ACID 650 MG/1
1950 TABLET ORAL
Status: CANCELLED | OUTPATIENT
Start: 2022-08-05 | End: 2022-08-05

## 2022-08-05 RX ORDER — SCOLOPAMINE TRANSDERMAL SYSTEM 1 MG/1
1 PATCH, EXTENDED RELEASE TRANSDERMAL
Status: CANCELLED | OUTPATIENT
Start: 2022-08-05

## 2022-08-05 RX ORDER — KETOROLAC TROMETHAMINE 30 MG/ML
15 INJECTION, SOLUTION INTRAMUSCULAR; INTRAVENOUS
Status: CANCELLED | OUTPATIENT
Start: 2022-08-05 | End: 2022-08-05

## 2022-08-05 RX ORDER — GABAPENTIN 100 MG/1
600 CAPSULE ORAL
Status: CANCELLED | OUTPATIENT
Start: 2022-08-05

## 2022-08-05 RX ORDER — ACETAMINOPHEN 500 MG
1000 TABLET ORAL
Status: CANCELLED | OUTPATIENT
Start: 2022-08-05 | End: 2022-08-05

## 2022-08-05 NOTE — H&P
Chief Complaint:   Chief Complaint   Patient presents with    Pre-op Exam     LEFT GINA 8/18/22        History of present illness:    66-year-old male presents office today for preop evaluation for robotic assisted left total hip arthroplasty on August 18th.  He has ongoing groin pain that is worse with weight-bearing activity.  He has decreased range of motion.  He has noticed a decrease in activities of daily living such as putting on his socks and shoes getting out of a chair.  No relief with physical therapy, home exercises, activity modification.  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;  Service: Gastroenterology;  Laterality: N/A;    HIP REPLACEMENT ARTHROPLASTY Right 02/02/2021       Current Outpatient Medications   Medication Sig    amitriptyline (ELAVIL) 50 MG tablet Take 1 tablet by mouth nightly.    amLODIPine (NORVASC) 5 MG tablet Take 5 mg by mouth Daily.    ammonium lactate 12 % Crea APPLY A SMALL AMOUNT TOPICALLY TWICE DAILY AS DIRECTED    DESCOVY 200-25 mg Tab Take 1 tablet by mouth once daily.    ergocalciferol (ERGOCALCIFEROL) 50,000 unit Cap Take 1 capsule (50,000 Units total) by mouth every 7 days.    EUTHYROX 75 mcg tablet Take 75 mcg by mouth once daily. For 30 days    HYDROcodone-acetaminophen (NORCO) 7.5-325 mg per tablet ONE TABLET (BY MOUTH) EVERY FOUR HOURS AS NEEDED *LAST 30 DAYS*    omeprazole (PRILOSEC) 20 MG capsule Take 20 mg by mouth once daily.    tadalafiL (CIALIS) 5 MG tablet Take 1 tablet (5 mg total) by mouth daily as needed for Erectile  "Dysfunction (PRN sexual activity).    TIVICAY 50 mg Tab Take 1 tablet (50 mg total) by mouth once daily.     No current facility-administered medications for this visit.       Review of patient's allergies indicates:  No Known Allergies    Family History   Problem Relation Age of Onset    Diabetes Mother     Diabetes Brother        Social History     Socioeconomic History    Marital status:    Tobacco Use    Smoking status: Heavy Tobacco Smoker     Packs/day: 0.50     Years: 46.00     Pack years: 23.00     Types: Cigarettes     Start date: 1/1/1976    Smokeless tobacco: Current User   Substance and Sexual Activity    Alcohol use: Yes     Alcohol/week: 4.0 standard drinks     Types: 4 Cans of beer per week    Drug use: Yes     Types: "Crack" cocaine         Review of Systems:    Constitution:   Denies chills, fever, and sweats.  HENT:   Denies headaches or blurry vision.  Cardiovascular:  Denies chest pain or irregular heart beat.  Respiratory:   Denies cough or shortness of breath.  Gastrointestinal:  Denies abdominal pain, nausea, or vomiting.  Musculoskeletal:   Denies muscle cramps.  Neurological:   Denies dizziness or focal weakness.  Psychiatric/Behavior: Normal mental status.  Hematology/Lymph:  Denies bleeding problem or easy bruising/bleeding.  Skin:    Denies rash or suspicious lesions.    Examination:    Vital Signs:    Vitals:    08/05/22 1054   BP: 139/73   Pulse: 108       There is no height or weight on file to calculate BMI.    Constitution:   Well-developed, well nourished patient in no acute distress.  Neurological:   Alert and oriented x 3 and cooperative to examination.     Psychiatric/Behavior: Normal mental status.  Respiratory:   No shortness of breath.  Eyes:    Extraoccular muscles intact  Skin:    No scars, rash or suspicious lesions.    Physical Exam:      Left hip exam     No signs of edema, erythema, or induration.    Tender over the greater trochanteric region.    Pain " with internal and external rotation    Passive hip abduction 30 degrees   Passive hip flexion 90 degrees   Passive internal rotation 5 degrees   Passive external rotation 20 degrees   No weakness of the hip was observed.   An antalgic gait was observed. limping was noted      xrays    Left hip x-ray with two or more views of the AP and lateral aspects were reviewed  Impressions   Advanced Joint space narrowing, bone-on-bone with osteophytes arising from the hip joint and subchondral cysts seen          Assessment:     Primary osteoarthritis of left hip    Impaired gait and mobility    Pre-operative laboratory examination    Primary hypertension    Gastroesophageal reflux disease, unspecified whether esophagitis present        Plan:      Robotic assisted left total hip arthroplasty  We will use Ecotrin aspirin postoperatively for DVT prophylaxis  The proposed procedure as well as associated risks/benefits were discussed at length with the patient and family with risks to include pain, bleeding, infection, future surgeries, neurovascular compromise, loss of limb, heart attack, stroke, deep vein thrombosis, and even death. They understand and agree with the treatment plan.        No follow-ups on file.    DISCLAIMER: This note may have been dictated using voice recognition software and may contain grammatical errors.

## 2022-08-05 NOTE — H&P (VIEW-ONLY)
Chief Complaint:   Chief Complaint   Patient presents with    Pre-op Exam     LEFT GINA 8/18/22        History of present illness:    66-year-old male presents office today for preop evaluation for robotic assisted left total hip arthroplasty on August 18th.  He has ongoing groin pain that is worse with weight-bearing activity.  He has decreased range of motion.  He has noticed a decrease in activities of daily living such as putting on his socks and shoes getting out of a chair.  No relief with physical therapy, home exercises, activity modification.  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    amLODIPine (NORVASC) 5 MG tablet Take 5 mg by mouth Daily.    ammonium lactate 12 % Crea APPLY A SMALL AMOUNT TOPICALLY TWICE DAILY AS DIRECTED    DESCOVY 200-25 mg Tab Take 1 tablet by mouth once daily.    ergocalciferol (ERGOCALCIFEROL) 50,000 unit Cap Take 1 capsule (50,000 Units total) by mouth every 7 days.    EUTHYROX 75 mcg tablet Take 75 mcg by mouth once daily. For 30 days    HYDROcodone-acetaminophen (NORCO) 7.5-325 mg per tablet ONE TABLET (BY MOUTH) EVERY FOUR HOURS AS NEEDED *LAST 30 DAYS*    omeprazole (PRILOSEC) 20 MG capsule Take 20 mg by mouth once daily.    tadalafiL (CIALIS) 5 MG tablet Take 1 tablet (5 mg total) by mouth daily as needed for Erectile  "Dysfunction (PRN sexual activity).    TIVICAY 50 mg Tab Take 1 tablet (50 mg total) by mouth once daily.     No current facility-administered medications for this visit.       Review of patient's allergies indicates:  No Known Allergies    Family History   Problem Relation Age of Onset    Diabetes Mother     Diabetes Brother        Social History     Socioeconomic History    Marital status:    Tobacco Use    Smoking status: Heavy Tobacco Smoker     Packs/day: 0.50     Years: 46.00     Pack years: 23.00     Types: Cigarettes     Start date: 1/1/1976    Smokeless tobacco: Current User   Substance and Sexual Activity    Alcohol use: Yes     Alcohol/week: 4.0 standard drinks     Types: 4 Cans of beer per week    Drug use: Yes     Types: "Crack" cocaine         Review of Systems:    Constitution:   Denies chills, fever, and sweats.  HENT:   Denies headaches or blurry vision.  Cardiovascular:  Denies chest pain or irregular heart beat.  Respiratory:   Denies cough or shortness of breath.  Gastrointestinal:  Denies abdominal pain, nausea, or vomiting.  Musculoskeletal:   Denies muscle cramps.  Neurological:   Denies dizziness or focal weakness.  Psychiatric/Behavior: Normal mental status.  Hematology/Lymph:  Denies bleeding problem or easy bruising/bleeding.  Skin:    Denies rash or suspicious lesions.    Examination:    Vital Signs:    Vitals:    08/05/22 1054   BP: 139/73   Pulse: 108       There is no height or weight on file to calculate BMI.    Constitution:   Well-developed, well nourished patient in no acute distress.  Neurological:   Alert and oriented x 3 and cooperative to examination.     Psychiatric/Behavior: Normal mental status.  Respiratory:   No shortness of breath.  Eyes:    Extraoccular muscles intact  Skin:    No scars, rash or suspicious lesions.    Physical Exam:      Left hip exam     No signs of edema, erythema, or induration.    Tender over the greater trochanteric region.    Pain " with internal and external rotation    Passive hip abduction 30 degrees   Passive hip flexion 90 degrees   Passive internal rotation 5 degrees   Passive external rotation 20 degrees   No weakness of the hip was observed.   An antalgic gait was observed. limping was noted      xrays    Left hip x-ray with two or more views of the AP and lateral aspects were reviewed  Impressions   Advanced Joint space narrowing, bone-on-bone with osteophytes arising from the hip joint and subchondral cysts seen          Assessment:     Primary osteoarthritis of left hip    Impaired gait and mobility    Pre-operative laboratory examination    Primary hypertension    Gastroesophageal reflux disease, unspecified whether esophagitis present        Plan:      Robotic assisted left total hip arthroplasty  We will use Ecotrin aspirin postoperatively for DVT prophylaxis  The proposed procedure as well as associated risks/benefits were discussed at length with the patient and family with risks to include pain, bleeding, infection, future surgeries, neurovascular compromise, loss of limb, heart attack, stroke, deep vein thrombosis, and even death. They understand and agree with the treatment plan.        No follow-ups on file.    DISCLAIMER: This note may have been dictated using voice recognition software and may contain grammatical errors.

## 2022-08-12 PROBLEM — N18.9 CHRONIC KIDNEY DISEASE: Status: ACTIVE | Noted: 2022-08-12

## 2022-08-12 PROBLEM — E55.9 VITAMIN D DEFICIENCY: Status: ACTIVE | Noted: 2022-08-12

## 2022-08-12 PROBLEM — B20 HIV DISEASE: Status: ACTIVE | Noted: 2022-08-12

## 2022-08-12 PROBLEM — Z86.19 HEPATITIS C VIRUS INFECTION CURED AFTER ANTIVIRAL DRUG THERAPY: Status: ACTIVE | Noted: 2022-08-12
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
## 2022-08-15 ENCOUNTER — OFFICE VISIT (OUTPATIENT)
Dept: INFECTIOUS DISEASES | Facility: CLINIC | Age: 67
End: 2022-08-15
Payer: MEDICARE

## 2022-08-15 VITALS
HEART RATE: 87 BPM | DIASTOLIC BLOOD PRESSURE: 76 MMHG | WEIGHT: 193 LBS | BODY MASS INDEX: 29.25 KG/M2 | SYSTOLIC BLOOD PRESSURE: 131 MMHG | RESPIRATION RATE: 18 BRPM | HEIGHT: 68 IN | TEMPERATURE: 98 F

## 2022-08-15 DIAGNOSIS — B20 HIV DISEASE: ICD-10-CM

## 2022-08-15 DIAGNOSIS — Z11.3 ROUTINE SCREENING FOR STI (SEXUALLY TRANSMITTED INFECTION): ICD-10-CM

## 2022-08-15 DIAGNOSIS — Z23 NEED FOR VACCINATION: ICD-10-CM

## 2022-08-15 DIAGNOSIS — N52.9 ERECTILE DYSFUNCTION, UNSPECIFIED ERECTILE DYSFUNCTION TYPE: ICD-10-CM

## 2022-08-15 DIAGNOSIS — Z86.19 HEPATITIS C VIRUS INFECTION CURED AFTER ANTIVIRAL DRUG THERAPY: ICD-10-CM

## 2022-08-15 DIAGNOSIS — F14.90 COCAINE USE: ICD-10-CM

## 2022-08-15 DIAGNOSIS — Z86.19 HISTORY OF SYPHILIS: ICD-10-CM

## 2022-08-15 DIAGNOSIS — B20 HIV DISEASE: Primary | ICD-10-CM

## 2022-08-15 DIAGNOSIS — N18.9 CHRONIC KIDNEY DISEASE, UNSPECIFIED CKD STAGE: ICD-10-CM

## 2022-08-15 DIAGNOSIS — E55.9 VITAMIN D DEFICIENCY: ICD-10-CM

## 2022-08-15 DIAGNOSIS — Z86.19 HISTORY OF CRYPTOCOCCAL MENINGITIS: ICD-10-CM

## 2022-08-15 LAB
ALBUMIN SERPL-MCNC: 4.2 GM/DL (ref 3.4–4.8)
ALBUMIN/GLOB SERPL: 1 RATIO (ref 1.1–2)
ALP SERPL-CCNC: 111 UNIT/L (ref 40–150)
ALT SERPL-CCNC: 26 UNIT/L (ref 0–55)
APPEARANCE UR: CLEAR
AST SERPL-CCNC: 22 UNIT/L (ref 5–34)
BACTERIA #/AREA URNS AUTO: ABNORMAL /HPF
BASOPHILS # BLD AUTO: 0.08 X10(3)/MCL (ref 0–0.2)
BASOPHILS NFR BLD AUTO: 1.6 %
BILIRUB UR QL STRIP.AUTO: NEGATIVE MG/DL
BILIRUBIN DIRECT+TOT PNL SERPL-MCNC: 0.2 MG/DL
BUN SERPL-MCNC: 24.8 MG/DL (ref 8.4–25.7)
CALCIUM SERPL-MCNC: 9.7 MG/DL (ref 8.8–10)
CHLORIDE SERPL-SCNC: 102 MMOL/L (ref 98–107)
CO2 SERPL-SCNC: 27 MMOL/L (ref 23–31)
COLOR UR AUTO: ABNORMAL
CREAT SERPL-MCNC: 1.88 MG/DL (ref 0.73–1.18)
EOSINOPHIL # BLD AUTO: 0.28 X10(3)/MCL (ref 0–0.9)
EOSINOPHIL NFR BLD AUTO: 5.5 %
ERYTHROCYTE [DISTWIDTH] IN BLOOD BY AUTOMATED COUNT: 14.5 % (ref 11.5–17)
GFR SERPLBLD CREATININE-BSD FMLA CKD-EPI: 39 MLS/MIN/1.73/M2
GLOBULIN SER-MCNC: 4.3 GM/DL (ref 2.4–3.5)
GLUCOSE SERPL-MCNC: 97 MG/DL (ref 82–115)
GLUCOSE UR QL STRIP.AUTO: NORMAL MG/DL
HAV AB SER QL IA: REACTIVE
HBV SURFACE AB SER-ACNC: 24.66 MIU/ML
HBV SURFACE AB SERPL IA-ACNC: REACTIVE M[IU]/ML
HCT VFR BLD AUTO: 41.8 % (ref 42–52)
HGB BLD-MCNC: 13.8 GM/DL (ref 14–18)
HYALINE CASTS #/AREA URNS LPF: ABNORMAL /LPF
IMM GRANULOCYTES # BLD AUTO: 0.07 X10(3)/MCL (ref 0–0.04)
IMM GRANULOCYTES NFR BLD AUTO: 1.4 %
KETONES UR QL STRIP.AUTO: NEGATIVE MG/DL
LEUKOCYTE ESTERASE UR QL STRIP.AUTO: 25 UNIT/L
LYMPHOCYTES # BLD AUTO: 1.64 X10(3)/MCL (ref 0.6–4.6)
LYMPHOCYTES NFR BLD AUTO: 32 %
MCH RBC QN AUTO: 31.9 PG (ref 27–31)
MCHC RBC AUTO-ENTMCNC: 33 MG/DL (ref 33–36)
MCV RBC AUTO: 96.8 FL (ref 80–94)
MONOCYTES # BLD AUTO: 0.51 X10(3)/MCL (ref 0.1–1.3)
MONOCYTES NFR BLD AUTO: 10 %
NEUTROPHILS # BLD AUTO: 2.5 X10(3)/MCL (ref 2.1–9.2)
NEUTROPHILS NFR BLD AUTO: 49.5 %
NITRITE UR QL STRIP.AUTO: NEGATIVE
NRBC BLD AUTO-RTO: 0 %
PH UR STRIP.AUTO: 6 [PH]
PLATELET # BLD AUTO: 336 X10(3)/MCL (ref 130–400)
PMV BLD AUTO: 11.4 FL (ref 7.4–10.4)
POTASSIUM SERPL-SCNC: 4.5 MMOL/L (ref 3.5–5.1)
PROT SERPL-MCNC: 8.5 GM/DL (ref 5.8–7.6)
PROT UR QL STRIP.AUTO: ABNORMAL MG/DL
RBC # BLD AUTO: 4.32 X10(6)/MCL (ref 4.7–6.1)
RBC #/AREA URNS AUTO: ABNORMAL /HPF
RBC UR QL AUTO: NEGATIVE UNIT/L
RPR SER QL: NORMAL
RPR SER QL: NORMAL
RPR SER-TITR: NORMAL {TITER}
SODIUM SERPL-SCNC: 137 MMOL/L (ref 136–145)
SP GR UR STRIP.AUTO: 1.02
SQUAMOUS #/AREA URNS LPF: ABNORMAL /HPF
T PALLIDUM AB SER QL: REACTIVE
UROBILINOGEN UR STRIP-ACNC: NORMAL MG/DL
WBC # SPEC AUTO: 5.1 X10(3)/MCL (ref 4.5–11.5)
WBC #/AREA URNS AUTO: ABNORMAL /HPF

## 2022-08-15 PROCEDURE — 99215 PR OFFICE/OUTPT VISIT, EST, LEVL V, 40-54 MIN: ICD-10-PCS | Mod: S$PBB,,, | Performed by: NURSE PRACTITIONER

## 2022-08-15 PROCEDURE — 86361 T CELL ABSOLUTE COUNT: CPT | Performed by: NURSE PRACTITIONER

## 2022-08-15 PROCEDURE — 86592 SYPHILIS TEST NON-TREP QUAL: CPT | Performed by: NURSE PRACTITIONER

## 2022-08-15 PROCEDURE — 86708 HEPATITIS A ANTIBODY: CPT

## 2022-08-15 PROCEDURE — 81001 URINALYSIS AUTO W/SCOPE: CPT | Performed by: NURSE PRACTITIONER

## 2022-08-15 PROCEDURE — 80053 COMPREHEN METABOLIC PANEL: CPT

## 2022-08-15 PROCEDURE — 86706 HEP B SURFACE ANTIBODY: CPT

## 2022-08-15 PROCEDURE — 36415 COLL VENOUS BLD VENIPUNCTURE: CPT | Performed by: NURSE PRACTITIONER

## 2022-08-15 PROCEDURE — 99215 OFFICE O/P EST HI 40 MIN: CPT | Mod: PBBFAC | Performed by: NURSE PRACTITIONER

## 2022-08-15 PROCEDURE — 86780 TREPONEMA PALLIDUM: CPT | Performed by: NURSE PRACTITIONER

## 2022-08-15 PROCEDURE — 36415 COLL VENOUS BLD VENIPUNCTURE: CPT

## 2022-08-15 PROCEDURE — 86780 TREPONEMA PALLIDUM: CPT | Mod: 59 | Performed by: NURSE PRACTITIONER

## 2022-08-15 PROCEDURE — 99215 OFFICE O/P EST HI 40 MIN: CPT | Mod: S$PBB,,, | Performed by: NURSE PRACTITIONER

## 2022-08-15 RX ORDER — MULTIVITAMIN
1 TABLET ORAL DAILY
COMMUNITY
Start: 2021-09-29 | End: 2022-11-23

## 2022-08-15 RX ORDER — DOLUTEGRAVIR SODIUM 50 MG/1
1 TABLET, FILM COATED ORAL DAILY
Qty: 30 TABLET | Refills: 4 | Status: SHIPPED | OUTPATIENT
Start: 2022-08-15 | End: 2022-09-14

## 2022-08-15 RX ORDER — EMTRICITABINE AND TENOFOVIR ALAFENAMIDE 200; 25 MG/1; MG/1
1 TABLET ORAL DAILY
Qty: 30 TABLET | Refills: 4 | Status: SHIPPED | OUTPATIENT
Start: 2022-08-15 | End: 2022-09-14

## 2022-08-15 RX ORDER — ASPIRIN 81 MG/1
81 TABLET ORAL DAILY
Status: ON HOLD | COMMUNITY
End: 2022-08-22 | Stop reason: SDUPTHER

## 2022-08-15 RX ORDER — TADALAFIL 5 MG/1
5 TABLET ORAL DAILY PRN
Qty: 10 TABLET | Refills: 2 | Status: SHIPPED | OUTPATIENT
Start: 2022-08-15 | End: 2023-01-11 | Stop reason: SDUPTHER

## 2022-08-16 LAB
AGE: 66
CD3+CD4+ CELLS # BLD: 32 % (ref 28–48)
CD3+CD4+ CELLS # SPEC: 515.71 UNIT/L (ref 589–1505)
CD3+CD4+ CELLS NFR BLD: 31.6 %
LYMPHOCYTES # BLD AUTO: 1632 X10(3)/MCL (ref 1260–5520)
LYMPHOMA - T-CELL MARKERS SPEC-IMP: ABNORMAL
WBC # BLD AUTO: 5100 /MM3 (ref 4500–11500)

## 2022-08-17 ENCOUNTER — ANESTHESIA EVENT (OUTPATIENT)
Dept: SURGERY | Facility: HOSPITAL | Age: 67
DRG: 470 | End: 2022-08-17
Payer: MEDICARE

## 2022-08-17 LAB
GAMMA INTERFERON BACKGROUND BLD IA-ACNC: 0.02 IU/ML
M TB IFN-G BLD-IMP: NEGATIVE
M TB IFN-G CD4+ BCKGRND COR BLD-ACNC: 0.01 IU/ML
M TB IFN-G CD4+CD8+ BCKGRND COR BLD-ACNC: 0 IU/ML
MITOGEN IGNF BCKGRD COR BLD-ACNC: 9.98 IU/ML

## 2022-08-18 ENCOUNTER — ANESTHESIA (OUTPATIENT)
Dept: SURGERY | Facility: HOSPITAL | Age: 67
DRG: 470 | End: 2022-08-18
Payer: MEDICARE

## 2022-08-18 ENCOUNTER — HOSPITAL ENCOUNTER (INPATIENT)
Facility: HOSPITAL | Age: 67
LOS: 4 days | Discharge: NURSING, REHAB OR LONG-TERM CARE | DRG: 470 | End: 2022-08-22
Attending: SPECIALIST | Admitting: SPECIALIST
Payer: MEDICARE

## 2022-08-18 DIAGNOSIS — M16.12 PRIMARY LOCALIZED OSTEOARTHRITIS OF LEFT HIP: ICD-10-CM

## 2022-08-18 DIAGNOSIS — M16.12 PRIMARY OSTEOARTHRITIS OF LEFT HIP: ICD-10-CM

## 2022-08-18 DIAGNOSIS — I10 PRIMARY HYPERTENSION: ICD-10-CM

## 2022-08-18 DIAGNOSIS — Z01.812 PRE-OPERATIVE LABORATORY EXAMINATION: ICD-10-CM

## 2022-08-18 DIAGNOSIS — K21.9 GASTROESOPHAGEAL REFLUX DISEASE, UNSPECIFIED WHETHER ESOPHAGITIS PRESENT: ICD-10-CM

## 2022-08-18 DIAGNOSIS — R26.89 IMPAIRED GAIT AND MOBILITY: ICD-10-CM

## 2022-08-18 LAB
HCT VFR BLD AUTO: 34.5 % (ref 42–52)
HCV RNA SERPL NAA+PROBE-ACNC: NORMAL IU/ML
HGB BLD-MCNC: 11.4 GM/DL (ref 14–18)
HIV1 RNA # PLAS NAA DL=20: <20 COPIES/ML
POCT GLUCOSE: 85 MG/DL (ref 70–110)

## 2022-08-18 PROCEDURE — 88311 DECALCIFY TISSUE: CPT | Performed by: SPECIALIST

## 2022-08-18 PROCEDURE — 25000003 PHARM REV CODE 250: Performed by: SPECIALIST

## 2022-08-18 PROCEDURE — 51798 US URINE CAPACITY MEASURE: CPT

## 2022-08-18 PROCEDURE — 71000033 HC RECOVERY, INTIAL HOUR: Performed by: SPECIALIST

## 2022-08-18 PROCEDURE — C1713 ANCHOR/SCREW BN/BN,TIS/BN: HCPCS | Performed by: SPECIALIST

## 2022-08-18 PROCEDURE — 25000003 PHARM REV CODE 250

## 2022-08-18 PROCEDURE — 82962 GLUCOSE BLOOD TEST: CPT | Performed by: SPECIALIST

## 2022-08-18 PROCEDURE — 30000890 SPECIMEN TO PATHOLOGY: Performed by: SPECIALIST

## 2022-08-18 PROCEDURE — 88304 TISSUE EXAM BY PATHOLOGIST: CPT | Performed by: SPECIALIST

## 2022-08-18 PROCEDURE — A4216 STERILE WATER/SALINE, 10 ML: HCPCS | Performed by: SPECIALIST

## 2022-08-18 PROCEDURE — 36415 COLL VENOUS BLD VENIPUNCTURE: CPT | Performed by: SPECIALIST

## 2022-08-18 PROCEDURE — 20985 CPTR-ASST DIR MS PX: CPT | Mod: ,,, | Performed by: SPECIALIST

## 2022-08-18 PROCEDURE — 30000890 HC MISC. SEND OUT TEST: Performed by: SPECIALIST

## 2022-08-18 PROCEDURE — 94761 N-INVAS EAR/PLS OXIMETRY MLT: CPT

## 2022-08-18 PROCEDURE — 63600175 PHARM REV CODE 636 W HCPCS: Performed by: SPECIALIST

## 2022-08-18 PROCEDURE — 85014 HEMATOCRIT: CPT | Performed by: SPECIALIST

## 2022-08-18 PROCEDURE — 36000713 HC OR TIME LEV V EA ADD 15 MIN: Performed by: SPECIALIST

## 2022-08-18 PROCEDURE — C1776 JOINT DEVICE (IMPLANTABLE): HCPCS | Performed by: SPECIALIST

## 2022-08-18 PROCEDURE — 51702 INSERT TEMP BLADDER CATH: CPT | Performed by: SPECIALIST

## 2022-08-18 PROCEDURE — 71000039 HC RECOVERY, EACH ADD'L HOUR: Performed by: SPECIALIST

## 2022-08-18 PROCEDURE — 63600175 PHARM REV CODE 636 W HCPCS

## 2022-08-18 PROCEDURE — 37000009 HC ANESTHESIA EA ADD 15 MINS: Performed by: SPECIALIST

## 2022-08-18 PROCEDURE — 97162 PT EVAL MOD COMPLEX 30 MIN: CPT

## 2022-08-18 PROCEDURE — 63600175 PHARM REV CODE 636 W HCPCS: Performed by: NURSE ANESTHETIST, CERTIFIED REGISTERED

## 2022-08-18 PROCEDURE — 11000001 HC ACUTE MED/SURG PRIVATE ROOM

## 2022-08-18 PROCEDURE — 27130 PR TOTAL HIP ARTHROPLASTY: ICD-10-PCS | Mod: AS,LT,, | Performed by: SPECIALIST

## 2022-08-18 PROCEDURE — 27130 TOTAL HIP ARTHROPLASTY: CPT | Mod: LT,,, | Performed by: SPECIALIST

## 2022-08-18 PROCEDURE — 36000712 HC OR TIME LEV V 1ST 15 MIN: Performed by: SPECIALIST

## 2022-08-18 PROCEDURE — 25000003 PHARM REV CODE 250: Performed by: NURSE PRACTITIONER

## 2022-08-18 PROCEDURE — 27800903 OPTIME MED/SURG SUP & DEVICES OTHER IMPLANTS: Performed by: SPECIALIST

## 2022-08-18 PROCEDURE — 27201423 OPTIME MED/SURG SUP & DEVICES STERILE SUPPLY: Performed by: SPECIALIST

## 2022-08-18 PROCEDURE — 37000008 HC ANESTHESIA 1ST 15 MINUTES: Performed by: SPECIALIST

## 2022-08-18 PROCEDURE — 20985 PR CPTR-ASST SURGICAL NAVIGATION IMAGE-LESS: ICD-10-PCS | Mod: ,,, | Performed by: SPECIALIST

## 2022-08-18 PROCEDURE — 94799 UNLISTED PULMONARY SVC/PX: CPT

## 2022-08-18 PROCEDURE — 25000003 PHARM REV CODE 250: Performed by: PHYSICIAN ASSISTANT

## 2022-08-18 PROCEDURE — 25000003 PHARM REV CODE 250: Performed by: NURSE ANESTHETIST, CERTIFIED REGISTERED

## 2022-08-18 PROCEDURE — 51798 US URINE CAPACITY MEASURE: CPT | Performed by: SPECIALIST

## 2022-08-18 DEVICE — HEX DOME HOLE PLUG
Type: IMPLANTABLE DEVICE | Site: HIP | Status: FUNCTIONAL
Brand: TRIDENT II

## 2022-08-18 DEVICE — 127 DEGREE NECK ANGLE HIP STEM
Type: IMPLANTABLE DEVICE | Site: HIP | Status: FUNCTIONAL
Brand: ACCOLADE

## 2022-08-18 DEVICE — TRIDENT X3 0 DEGREE POLYETHYLENE INSERT
Type: IMPLANTABLE DEVICE | Site: HIP | Status: FUNCTIONAL
Brand: TRIDENT X3 INSERT

## 2022-08-18 DEVICE — TRIDENT II TRITANIUM CLUSTER 52E
Type: IMPLANTABLE DEVICE | Site: HIP | Status: FUNCTIONAL
Brand: TRIDENT II

## 2022-08-18 DEVICE — CERAMIC V40 FEMORAL HEAD
Type: IMPLANTABLE DEVICE | Site: HIP | Status: FUNCTIONAL
Brand: BIOLOX

## 2022-08-18 DEVICE — 6.5MM LOW PROFILE HEX SCREW 35MM
Type: IMPLANTABLE DEVICE | Site: HIP | Status: FUNCTIONAL
Brand: TRIDENT II

## 2022-08-18 RX ORDER — TALC
6 POWDER (GRAM) TOPICAL NIGHTLY PRN
Status: DISCONTINUED | OUTPATIENT
Start: 2022-08-18 | End: 2022-08-22 | Stop reason: HOSPADM

## 2022-08-18 RX ORDER — SODIUM CHLORIDE 9 MG/ML
INJECTION, SOLUTION INTRAVENOUS CONTINUOUS
Status: DISCONTINUED | OUTPATIENT
Start: 2022-08-18 | End: 2022-08-18

## 2022-08-18 RX ORDER — MORPHINE SULFATE 10 MG/ML
INJECTION INTRAMUSCULAR; INTRAVENOUS; SUBCUTANEOUS
Status: DISCONTINUED | OUTPATIENT
Start: 2022-08-18 | End: 2022-08-18 | Stop reason: HOSPADM

## 2022-08-18 RX ORDER — METOCLOPRAMIDE HYDROCHLORIDE 5 MG/ML
10 INJECTION INTRAMUSCULAR; INTRAVENOUS
Status: COMPLETED | OUTPATIENT
Start: 2022-08-18 | End: 2022-08-19

## 2022-08-18 RX ORDER — CEFAZOLIN SODIUM 1 G/3ML
INJECTION, POWDER, FOR SOLUTION INTRAMUSCULAR; INTRAVENOUS
Status: DISCONTINUED | OUTPATIENT
Start: 2022-08-18 | End: 2022-08-18 | Stop reason: HOSPADM

## 2022-08-18 RX ORDER — ACETAMINOPHEN 500 MG
1000 TABLET ORAL
Status: COMPLETED | OUTPATIENT
Start: 2022-08-18 | End: 2022-08-18

## 2022-08-18 RX ORDER — AMITRIPTYLINE HYDROCHLORIDE 25 MG/1
50 TABLET, FILM COATED ORAL NIGHTLY
Status: DISCONTINUED | OUTPATIENT
Start: 2022-08-18 | End: 2022-08-22 | Stop reason: HOSPADM

## 2022-08-18 RX ORDER — ROPIVACAINE HYDROCHLORIDE 5 MG/ML
INJECTION, SOLUTION EPIDURAL; INFILTRATION; PERINEURAL
Status: DISCONTINUED | OUTPATIENT
Start: 2022-08-18 | End: 2022-08-18 | Stop reason: HOSPADM

## 2022-08-18 RX ORDER — NAPROXEN SODIUM 220 MG/1
81 TABLET, FILM COATED ORAL 2 TIMES DAILY
Status: DISCONTINUED | OUTPATIENT
Start: 2022-08-19 | End: 2022-08-22 | Stop reason: HOSPADM

## 2022-08-18 RX ORDER — TRAMADOL HYDROCHLORIDE 50 MG/1
50 TABLET ORAL EVERY 4 HOURS PRN
Status: DISCONTINUED | OUTPATIENT
Start: 2022-08-18 | End: 2022-08-22 | Stop reason: HOSPADM

## 2022-08-18 RX ORDER — EPINEPHRINE 1 MG/ML
INJECTION, SOLUTION INTRACARDIAC; INTRAMUSCULAR; INTRAVENOUS; SUBCUTANEOUS
Status: DISCONTINUED | OUTPATIENT
Start: 2022-08-18 | End: 2022-08-18 | Stop reason: HOSPADM

## 2022-08-18 RX ORDER — DIPHENHYDRAMINE HYDROCHLORIDE 50 MG/ML
25 INJECTION INTRAMUSCULAR; INTRAVENOUS EVERY 6 HOURS PRN
Status: DISCONTINUED | OUTPATIENT
Start: 2022-08-18 | End: 2022-08-18

## 2022-08-18 RX ORDER — GABAPENTIN 300 MG/1
600 CAPSULE ORAL
Status: COMPLETED | OUTPATIENT
Start: 2022-08-18 | End: 2022-08-18

## 2022-08-18 RX ORDER — SCOLOPAMINE TRANSDERMAL SYSTEM 1 MG/1
1 PATCH, EXTENDED RELEASE TRANSDERMAL
Status: DISCONTINUED | OUTPATIENT
Start: 2022-08-18 | End: 2022-08-18

## 2022-08-18 RX ORDER — SODIUM CHLORIDE, SODIUM GLUCONATE, SODIUM ACETATE, POTASSIUM CHLORIDE AND MAGNESIUM CHLORIDE 30; 37; 368; 526; 502 MG/100ML; MG/100ML; MG/100ML; MG/100ML; MG/100ML
1000 INJECTION, SOLUTION INTRAVENOUS CONTINUOUS
Status: CANCELLED | OUTPATIENT
Start: 2022-08-18 | End: 2022-09-17

## 2022-08-18 RX ORDER — ACETAMINOPHEN 10 MG/ML
1000 INJECTION, SOLUTION INTRAVENOUS ONCE
Status: DISCONTINUED | OUTPATIENT
Start: 2022-08-18 | End: 2022-08-18

## 2022-08-18 RX ORDER — AMOXICILLIN 250 MG
2 CAPSULE ORAL 2 TIMES DAILY
Status: DISCONTINUED | OUTPATIENT
Start: 2022-08-18 | End: 2022-08-22 | Stop reason: HOSPADM

## 2022-08-18 RX ORDER — METHOCARBAMOL 750 MG/1
750 TABLET, FILM COATED ORAL EVERY 8 HOURS PRN
Status: DISCONTINUED | OUTPATIENT
Start: 2022-08-18 | End: 2022-08-22 | Stop reason: HOSPADM

## 2022-08-18 RX ORDER — ACETAMINOPHEN 10 MG/ML
1000 INJECTION, SOLUTION INTRAVENOUS ONCE
Status: COMPLETED | OUTPATIENT
Start: 2022-08-18 | End: 2022-08-18

## 2022-08-18 RX ORDER — SODIUM CHLORIDE 0.9 % (FLUSH) 0.9 %
SYRINGE (ML) INJECTION
Status: DISPENSED
Start: 2022-08-18 | End: 2022-08-18

## 2022-08-18 RX ORDER — POLYETHYLENE GLYCOL 3350 17 G/17G
17 POWDER, FOR SOLUTION ORAL NIGHTLY
Status: DISCONTINUED | OUTPATIENT
Start: 2022-08-18 | End: 2022-08-22 | Stop reason: HOSPADM

## 2022-08-18 RX ORDER — KETOROLAC TROMETHAMINE 30 MG/ML
INJECTION, SOLUTION INTRAMUSCULAR; INTRAVENOUS
Status: DISCONTINUED | OUTPATIENT
Start: 2022-08-18 | End: 2022-08-18 | Stop reason: HOSPADM

## 2022-08-18 RX ORDER — CEFAZOLIN SODIUM 2 G/100ML
2 INJECTION, SOLUTION INTRAVENOUS
Status: DISCONTINUED | OUTPATIENT
Start: 2022-08-18 | End: 2022-08-18

## 2022-08-18 RX ORDER — LACTULOSE 10 G/15ML
20 SOLUTION ORAL EVERY 6 HOURS PRN
Status: DISCONTINUED | OUTPATIENT
Start: 2022-08-18 | End: 2022-08-22 | Stop reason: HOSPADM

## 2022-08-18 RX ORDER — CEFAZOLIN SODIUM 1 G/3ML
INJECTION, POWDER, FOR SOLUTION INTRAMUSCULAR; INTRAVENOUS
Status: DISPENSED
Start: 2022-08-18 | End: 2022-08-18

## 2022-08-18 RX ORDER — CEFAZOLIN SODIUM IN 0.9 % NACL 2 G/100 ML
PLASTIC BAG, INJECTION (ML) INTRAVENOUS
Status: DISCONTINUED | OUTPATIENT
Start: 2022-08-18 | End: 2022-08-18

## 2022-08-18 RX ORDER — ROPIVACAINE HYDROCHLORIDE 5 MG/ML
INJECTION, SOLUTION EPIDURAL; INFILTRATION; PERINEURAL
Status: DISPENSED
Start: 2022-08-18 | End: 2022-08-18

## 2022-08-18 RX ORDER — KETOROLAC TROMETHAMINE 30 MG/ML
15 INJECTION, SOLUTION INTRAMUSCULAR; INTRAVENOUS EVERY 6 HOURS
Status: COMPLETED | OUTPATIENT
Start: 2022-08-18 | End: 2022-08-19

## 2022-08-18 RX ORDER — MIDAZOLAM HYDROCHLORIDE 1 MG/ML
2 INJECTION INTRAMUSCULAR; INTRAVENOUS ONCE AS NEEDED
Status: CANCELLED | OUTPATIENT
Start: 2022-08-18 | End: 2034-01-13

## 2022-08-18 RX ORDER — ONDANSETRON 2 MG/ML
4 INJECTION INTRAMUSCULAR; INTRAVENOUS DAILY PRN
Status: DISCONTINUED | OUTPATIENT
Start: 2022-08-18 | End: 2022-08-18

## 2022-08-18 RX ORDER — CALCIUM CHLORIDE INJECTION 100 MG/ML
INJECTION, SOLUTION INTRAVENOUS
Status: DISCONTINUED | OUTPATIENT
Start: 2022-08-18 | End: 2022-08-18

## 2022-08-18 RX ORDER — MIDAZOLAM HYDROCHLORIDE 1 MG/ML
INJECTION INTRAMUSCULAR; INTRAVENOUS
Status: DISCONTINUED | OUTPATIENT
Start: 2022-08-18 | End: 2022-08-18

## 2022-08-18 RX ORDER — DOCUSATE SODIUM 100 MG/1
200 CAPSULE, LIQUID FILLED ORAL DAILY
Status: DISCONTINUED | OUTPATIENT
Start: 2022-08-19 | End: 2022-08-22 | Stop reason: HOSPADM

## 2022-08-18 RX ORDER — CEFAZOLIN SODIUM 2 G/100ML
2 INJECTION, SOLUTION INTRAVENOUS
Status: COMPLETED | OUTPATIENT
Start: 2022-08-18 | End: 2022-08-19

## 2022-08-18 RX ORDER — KETOROLAC TROMETHAMINE 30 MG/ML
INJECTION, SOLUTION INTRAMUSCULAR; INTRAVENOUS
Status: DISPENSED
Start: 2022-08-18 | End: 2022-08-18

## 2022-08-18 RX ORDER — GABAPENTIN 300 MG/1
300 CAPSULE ORAL NIGHTLY
Status: DISCONTINUED | OUTPATIENT
Start: 2022-08-18 | End: 2022-08-22 | Stop reason: HOSPADM

## 2022-08-18 RX ORDER — LIDOCAINE HYDROCHLORIDE 10 MG/ML
1 INJECTION, SOLUTION EPIDURAL; INFILTRATION; INTRACAUDAL; PERINEURAL ONCE
Status: CANCELLED | OUTPATIENT
Start: 2022-08-18 | End: 2022-08-18

## 2022-08-18 RX ORDER — GENTAMICIN SULFATE 40 MG/ML
INJECTION, SOLUTION INTRAMUSCULAR; INTRAVENOUS
Status: DISPENSED
Start: 2022-08-18 | End: 2022-08-18

## 2022-08-18 RX ORDER — SODIUM CHLORIDE 9 MG/ML
INJECTION, SOLUTION INTRAMUSCULAR; INTRAVENOUS; SUBCUTANEOUS
Status: DISCONTINUED | OUTPATIENT
Start: 2022-08-18 | End: 2022-08-18 | Stop reason: HOSPADM

## 2022-08-18 RX ORDER — HYDROCODONE BITARTRATE AND ACETAMINOPHEN 5; 325 MG/1; MG/1
1 TABLET ORAL EVERY 4 HOURS PRN
Status: DISCONTINUED | OUTPATIENT
Start: 2022-08-18 | End: 2022-08-22 | Stop reason: HOSPADM

## 2022-08-18 RX ORDER — ONDANSETRON 2 MG/ML
4 INJECTION INTRAMUSCULAR; INTRAVENOUS EVERY 6 HOURS PRN
Status: DISCONTINUED | OUTPATIENT
Start: 2022-08-18 | End: 2022-08-22 | Stop reason: HOSPADM

## 2022-08-18 RX ORDER — HYDROCODONE BITARTRATE AND ACETAMINOPHEN 7.5; 325 MG/1; MG/1
1 TABLET ORAL EVERY 4 HOURS PRN
Status: DISCONTINUED | OUTPATIENT
Start: 2022-08-18 | End: 2022-08-22 | Stop reason: HOSPADM

## 2022-08-18 RX ORDER — TRANEXAMIC ACID 650 MG/1
1950 TABLET ORAL
Status: COMPLETED | OUTPATIENT
Start: 2022-08-18 | End: 2022-08-18

## 2022-08-18 RX ORDER — FAMOTIDINE 20 MG/1
20 TABLET, FILM COATED ORAL DAILY
Status: DISCONTINUED | OUTPATIENT
Start: 2022-08-19 | End: 2022-08-22 | Stop reason: HOSPADM

## 2022-08-18 RX ORDER — MAG HYDROX/ALUMINUM HYD/SIMETH 200-200-20
30 SUSPENSION, ORAL (FINAL DOSE FORM) ORAL EVERY 6 HOURS PRN
Status: DISCONTINUED | OUTPATIENT
Start: 2022-08-18 | End: 2022-08-22 | Stop reason: HOSPADM

## 2022-08-18 RX ORDER — PHENYLEPHRINE HYDROCHLORIDE 10 MG/ML
INJECTION INTRAVENOUS
Status: DISCONTINUED | OUTPATIENT
Start: 2022-08-18 | End: 2022-08-18

## 2022-08-18 RX ORDER — ACETAMINOPHEN 500 MG
500 TABLET ORAL EVERY 4 HOURS
Status: DISCONTINUED | OUTPATIENT
Start: 2022-08-18 | End: 2022-08-19

## 2022-08-18 RX ORDER — EPINEPHRINE 1 MG/ML
INJECTION, SOLUTION INTRACARDIAC; INTRAMUSCULAR; INTRAVENOUS; SUBCUTANEOUS
Status: DISPENSED
Start: 2022-08-18 | End: 2022-08-18

## 2022-08-18 RX ORDER — ONDANSETRON 2 MG/ML
INJECTION INTRAMUSCULAR; INTRAVENOUS
Status: DISCONTINUED | OUTPATIENT
Start: 2022-08-18 | End: 2022-08-18

## 2022-08-18 RX ORDER — MORPHINE SULFATE 4 MG/ML
4 INJECTION, SOLUTION INTRAMUSCULAR; INTRAVENOUS
Status: ACTIVE | OUTPATIENT
Start: 2022-08-18 | End: 2022-08-19

## 2022-08-18 RX ORDER — BISACODYL 10 MG
10 SUPPOSITORY, RECTAL RECTAL DAILY
Status: ACTIVE | OUTPATIENT
Start: 2022-08-21 | End: 2022-08-22

## 2022-08-18 RX ORDER — PROPOFOL 10 MG/ML
VIAL (ML) INTRAVENOUS CONTINUOUS PRN
Status: DISCONTINUED | OUTPATIENT
Start: 2022-08-18 | End: 2022-08-18

## 2022-08-18 RX ORDER — SODIUM CHLORIDE 9 MG/ML
INJECTION, SOLUTION INTRAVENOUS CONTINUOUS
Status: DISCONTINUED | OUTPATIENT
Start: 2022-08-18 | End: 2022-08-22 | Stop reason: HOSPADM

## 2022-08-18 RX ORDER — HYDROMORPHONE HYDROCHLORIDE 2 MG/ML
0.4 INJECTION, SOLUTION INTRAMUSCULAR; INTRAVENOUS; SUBCUTANEOUS EVERY 5 MIN PRN
Status: DISCONTINUED | OUTPATIENT
Start: 2022-08-18 | End: 2022-08-18

## 2022-08-18 RX ORDER — MORPHINE SULFATE 10 MG/ML
INJECTION INTRAMUSCULAR; INTRAVENOUS; SUBCUTANEOUS
Status: DISPENSED
Start: 2022-08-18 | End: 2022-08-18

## 2022-08-18 RX ORDER — CALCIUM CARBONATE 200(500)MG
500 TABLET,CHEWABLE ORAL 3 TIMES DAILY PRN
Status: DISCONTINUED | OUTPATIENT
Start: 2022-08-18 | End: 2022-08-22 | Stop reason: HOSPADM

## 2022-08-18 RX ORDER — DEXAMETHASONE SODIUM PHOSPHATE 4 MG/ML
INJECTION, SOLUTION INTRA-ARTICULAR; INTRALESIONAL; INTRAMUSCULAR; INTRAVENOUS; SOFT TISSUE
Status: DISCONTINUED | OUTPATIENT
Start: 2022-08-18 | End: 2022-08-18

## 2022-08-18 RX ORDER — HYDROCODONE BITARTRATE AND ACETAMINOPHEN 5; 325 MG/1; MG/1
1 TABLET ORAL EVERY 4 HOURS PRN
Status: DISCONTINUED | OUTPATIENT
Start: 2022-08-18 | End: 2022-08-18

## 2022-08-18 RX ORDER — AMLODIPINE BESYLATE 5 MG/1
10 TABLET ORAL DAILY
Status: DISCONTINUED | OUTPATIENT
Start: 2022-08-18 | End: 2022-08-22 | Stop reason: HOSPADM

## 2022-08-18 RX ADMIN — PHENYLEPHRINE HYDROCHLORIDE 200 MCG: 10 INJECTION INTRAVENOUS at 07:08

## 2022-08-18 RX ADMIN — PROPOFOL 50 MCG/KG/MIN: 10 INJECTION, EMULSION INTRAVENOUS at 07:08

## 2022-08-18 RX ADMIN — PHENYLEPHRINE HYDROCHLORIDE 300 MCG: 10 INJECTION INTRAVENOUS at 07:08

## 2022-08-18 RX ADMIN — SENNOSIDES AND DOCUSATE SODIUM 2 TABLET: 50; 8.6 TABLET ORAL at 08:08

## 2022-08-18 RX ADMIN — GABAPENTIN 300 MG: 300 CAPSULE ORAL at 08:08

## 2022-08-18 RX ADMIN — ACETAMINOPHEN 1000 MG: 10 INJECTION INTRAVENOUS at 05:08

## 2022-08-18 RX ADMIN — CALCIUM CHLORIDE INJECTION 250 MG: 100 INJECTION, SOLUTION INTRAVENOUS at 07:08

## 2022-08-18 RX ADMIN — SODIUM CHLORIDE, SODIUM GLUCONATE, SODIUM ACETATE, POTASSIUM CHLORIDE AND MAGNESIUM CHLORIDE: 526; 502; 368; 37; 30 INJECTION, SOLUTION INTRAVENOUS at 06:08

## 2022-08-18 RX ADMIN — HYDROCODONE BITARTRATE AND ACETAMINOPHEN 1 TABLET: 5; 325 TABLET ORAL at 11:08

## 2022-08-18 RX ADMIN — SODIUM CHLORIDE: 9 INJECTION, SOLUTION INTRAVENOUS at 09:08

## 2022-08-18 RX ADMIN — CEFAZOLIN SODIUM 2000 MG: 2 INJECTION, SOLUTION INTRAVENOUS at 02:08

## 2022-08-18 RX ADMIN — HYDROCODONE BITARTRATE AND ACETAMINOPHEN 1 TABLET: 5; 325 TABLET ORAL at 04:08

## 2022-08-18 RX ADMIN — HYDROCODONE BITARTRATE AND ACETAMINOPHEN 1 TABLET: 5; 325 TABLET ORAL at 08:08

## 2022-08-18 RX ADMIN — METOCLOPRAMIDE 10 MG: 5 INJECTION, SOLUTION INTRAMUSCULAR; INTRAVENOUS at 08:08

## 2022-08-18 RX ADMIN — POLYETHYLENE GLYCOL 3350 17 G: 17 POWDER, FOR SOLUTION ORAL at 08:08

## 2022-08-18 RX ADMIN — ONDANSETRON HYDROCHLORIDE 4 MG: 2 SOLUTION INTRAMUSCULAR; INTRAVENOUS at 07:08

## 2022-08-18 RX ADMIN — ACETAMINOPHEN 1000 MG: 500 TABLET ORAL at 06:08

## 2022-08-18 RX ADMIN — DEXAMETHASONE SODIUM PHOSPHATE 4 MG: 4 INJECTION, SOLUTION INTRA-ARTICULAR; INTRALESIONAL; INTRAMUSCULAR; INTRAVENOUS; SOFT TISSUE at 07:08

## 2022-08-18 RX ADMIN — CALCIUM CHLORIDE INJECTION 750 MG: 100 INJECTION, SOLUTION INTRAVENOUS at 07:08

## 2022-08-18 RX ADMIN — CEFAZOLIN SODIUM 2000 MG: 2 INJECTION, SOLUTION INTRAVENOUS at 08:08

## 2022-08-18 RX ADMIN — MIDAZOLAM 2 MG: 1 INJECTION INTRAMUSCULAR; INTRAVENOUS at 07:08

## 2022-08-18 RX ADMIN — GABAPENTIN 600 MG: 300 CAPSULE ORAL at 06:08

## 2022-08-18 RX ADMIN — AMITRIPTYLINE HYDROCHLORIDE 50 MG: 25 TABLET, FILM COATED ORAL at 08:08

## 2022-08-18 RX ADMIN — TRANEXAMIC ACID 1950 MG: 650 TABLET ORAL at 06:08

## 2022-08-18 RX ADMIN — PHENYLEPHRINE HYDROCHLORIDE 0.2 MCG/KG/MIN: 10 INJECTION INTRAVENOUS at 07:08

## 2022-08-18 RX ADMIN — SODIUM CHLORIDE, SODIUM GLUCONATE, SODIUM ACETATE, POTASSIUM CHLORIDE AND MAGNESIUM CHLORIDE: 526; 502; 368; 37; 30 INJECTION, SOLUTION INTRAVENOUS at 07:08

## 2022-08-18 RX ADMIN — METOCLOPRAMIDE 10 MG: 5 INJECTION, SOLUTION INTRAMUSCULAR; INTRAVENOUS at 02:08

## 2022-08-18 RX ADMIN — Medication 2 G: at 07:08

## 2022-08-18 RX ADMIN — KETOROLAC TROMETHAMINE 15 MG: 30 INJECTION, SOLUTION INTRAMUSCULAR at 04:08

## 2022-08-18 RX ADMIN — MIDAZOLAM 2 MG: 1 INJECTION INTRAMUSCULAR; INTRAVENOUS at 06:08

## 2022-08-18 NOTE — PT/OT/SLP EVAL
"Physical Therapy Evaluation    Patient Name:  Renetta Glover Jr.   MRN:  [id number]    Recommendations:     Discharge Recommendations:  nursing facility, skilled   Discharge Equipment Recommendations: walker, rolling   Barriers to discharge: Decreased caregiver support    Assessment:     Renetta Glover Jr. is a 66 y.o. male admitted with a medical diagnosis of Primary osteoarthritis of left hip.  He presents with the following impairments/functional limitations:  weakness, impaired endurance, impaired functional mobility, gait instability, impaired balance, decreased coordination, decreased lower extremity function, decreased safety awareness, pain, decreased ROM, edema, orthopedic precautions .    Rehab Prognosis: Good; patient would benefit from acute skilled PT services to address these deficits and reach maximum level of function.    Recent Surgery: Procedure(s) (LRB):  ROBOTIC ARTHROPLASTY,HIP (Left) Day of Surgery    Plan:     During this hospitalization, patient to be seen BID to address the identified rehab impairments via gait training, therapeutic activities, therapeutic exercises and progress toward the following goals:    · Plan of Care Expires:  08/24/22    Subjective     Chief Complaint: L hip discomfort  Patient/Family Comments/goals: "To walk"  Pain/Comfort:  · Location - Side 1: Left  · Location 1: hip  · Pain Addressed 1: Reposition, Distraction, Cessation of Activity    Patients cultural, spiritual, Methodist conflicts given the current situation: no    Living Environment:  Pt reports that he lives alone in a home that has 3 stairs, mack rails to enter. Pt states his nephew lives next door and could assist him, but his nephew does work. Pt is retired. Pt states he had a R GINA in February 2021.   Prior to admission, patients level of function was Mod I.  Equipment used at home: cane, straight.  DME owned (not currently used): rolling walker.  Upon discharge, patient will have assistance from his " nephew (inconsistent).  Prehab: Pt reports that he did not participate in any prehab prior to surgery.     Objective:     Communicated with NSG prior to session.  Patient found supine with peripheral IV, Condom Catheter  upon PT entry to room.    General Precautions: Standard, fall   Orthopedic Precautions:LLE partial weight bearing (75%. Hip precautions)   Braces: N/A  Respiratory Status: Room air    Exams:  · RLE ROM: WFL  · RLE Strength: WFL  · LLE ROM: limited d/t hip pxns  · LLE Strength: NT d/t surgical side    Functional Mobility:  · Bed Mobility:     · Supine to Sit: stand by assistance  · Transfers:     · Sit to Stand:  minimum assistance with rolling walker  · Gait: 100ft with RW, Min A, VC to keep RW in closer proximity, occasional unsteadiness, espeically as pt fatigued. Distance limited by fatigue.    Patient left up in chair with all lines intact, call button in reach and NSG notified.    GOALS:   Multidisciplinary Problems     Physical Therapy Goals        Problem: Physical Therapy    Goal Priority Disciplines Outcome Goal Variances Interventions   Physical Therapy Goal     PT, PT/OT Ongoing, Progressing     Description: Pt will improve functional independence by performing:    Bed mobility: SBA  Sit to stand: SBA  Ambulation x 150' with SBA with rolling walker  1 Step (Curb): Min A  with rolling walker  3 Steps: Min A  with B HR  Independent with total hip HEP                     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

## 2022-08-18 NOTE — ANESTHESIA POSTPROCEDURE EVALUATION
Anesthesia Post Evaluation    Patient: Renetta Glover Jr.    Procedure(s) Performed: Procedure(s) (LRB):  ROBOTIC ARTHROPLASTY,HIP (Left)    Final Anesthesia Type: general      Patient location during evaluation: PACU  Patient participation: Yes- Able to Participate  Level of consciousness: awake and alert  Post-procedure vital signs: reviewed and stable  Pain management: adequate  Airway patency: patent      Anesthetic complications: no      Cardiovascular status: blood pressure returned to baseline  Respiratory status: unassisted  Hydration status: euvolemic  Follow-up not needed.          Vitals Value Taken Time   /58 08/18/22 1158   Temp 36.3 °C (97.4 °F) 08/18/22 1000   Pulse 95 08/18/22 1212   Resp 16 08/18/22 1117   SpO2 92 % 08/18/22 1212         Event Time   Out of Recovery 09:55:00         Pain/Daniela Score: Pain Rating Prior to Med Admin: 9 (8/18/2022 11:17 AM)  Daniela Score: 9 (8/18/2022  9:55 AM)

## 2022-08-18 NOTE — TRANSFER OF CARE
"Anesthesia Transfer of Care Note    Patient: Renetta Glover Jr.    Procedure(s) Performed: Procedure(s) (LRB):  ROBOTIC ARTHROPLASTY,HIP (Left)    Patient location: PACU    Anesthesia Type: general    Transport from OR: Transported from OR on room air with adequate spontaneous ventilation    Post pain: adequate analgesia    Post assessment: no apparent anesthetic complications    Post vital signs: stable    Level of consciousness: responds to stimulation    Nausea/Vomiting: no nausea/vomiting    Complications: none    Transfer of care protocol was followed      Last vitals:   Visit Vitals  BP (!) 145/85   Pulse 93   Temp 36.4 °C (97.5 °F) (Tympanic)   Resp 20   Ht 5' 8" (1.727 m)   Wt 87.5 kg (192 lb 14.4 oz)   SpO2 (!) 93%   BMI 29.33 kg/m²     "

## 2022-08-18 NOTE — OP NOTE
DATE OF PROCEDURE: 08/18/2022    PREOPERATIVE DIAGNOSIS: Arthritis,left hip.   POSTOPERATIVE DIAGNOSIS: Arthritis, left hip.   PROCEDURES PERFORMED: left total hip arthroplasty with robotic navigation.   SURGEON: Zachary Bass M.D.   ASSISTANT: First assistant:Jose Rafael Will, certified physician assistant, who was necessary and essential for all aspects of the operation, including but no limited to patient positioning, surgical exposure, bony preparation, implantation, wound closure, and dressing placement.    ANESTHESIA: spinal    SPECIMEN: Femoral Head  FINDINGS: Arthritis  BLOOD LOSS: 250ml  COMPLICATIONS: None.   COUNTS: Correct.   DISPOSITION: Recovery Room, stable.   IMPLANTS: [place] Trident II size 52mm acetabular component with a 36mm x neutral liner, [place] Accolade IIsize 5 ,    hip stem with a 36 mm standard neck length Biolox femoral head.   INDICATIONS FOR PROCEDURE: Renetta Glover Jr. is a 66 y.o. year old male  who is having   symptoms of left hip pain. Physical examination and imaging studies were   consistent with left hip arthritis. He did not respond to nonoperative   treatment measures. Treatment options were explained to the patient. He   decided to proceed with left total hip arthroplasty with robotic assistance.   He was aware of reasonable treatment options as well as risks and benefits, and   elected to undergo the procedure.   PROCEDURE IN DETAIL: After appropriate consent was obtained, the patient was   brought in the Operating Room, anesthesia was administered.  Prior to this, the patient received antibiotic prophylaxis.   He was then positioned in the lateral decubitus position with the left hip   pointed upward. Axillary roll was placed. Bony prominences were well padded.   Pegboard positioning system was utilized. The left hip and lower extremity   were then prepped and draped in the usual sterile fashion. A time out was called.  There were no concerns expressed by members of  the team, and the correct side was confirmed. Three small incisions were made over the iliac crest, followed by pin placement. The pelvic array was assembled and registered.  Anterolateral   approach to the hip was made. Incision was made over the greater trochanter and was taken down through the skin and tensor fascia.   The tensor fascia was split in line with the skin incision. Skin bleeders were coagulated with cautery. The sciatic nerve   was palpated and protected, it was out of harm's way and the Charnley retractor was placed. Femoral checkpoint was placed and registered.The anterolateral approach was performed by incising the anterior one-fourth of the gluteus medius while leaving a tendinous cuff for later repair. I then incised the gluteus minimus superiorly in line with the neck of the femur. A posterior capsulotomy was performed along the neck of the femur. The hip was externally rotated until the fibers of the vastus intermedius were identified. Retractors were repositioned to expose the anterior capsule and an anterior capsulotomy was performed. The hip was then externally rotated, dislocated, and a femoral neck osteotomy was made at the appropriate level. The femoral head and acetabulum were denuded of articular cartilage down to subchondral bone. Acetabular retractors were placed and the labrum was excised. The pelvic checkpoint was then registered followed by fine point registration of the acetabulum. Reaming was then performed to the planned depth and position robotically.  Utilizing the robotic interactive arm, the cup was impacted in 41 degrees abduction and 21 degrees anteversion. There was an excellent pressfit of the acetabular component. The liner was then pressfit into the cup.  The hip was then repositioned to expose the proximal femur. The box osteotome was used to lateralize the starting point on the femur, followed by the awl to open up the canal.  Sequential broaching was performed up  to a  5 broach. Trialing was performed with a 127-degree neck angle stem in 5 -degrees of anteversion.  The hip was reduced and there was excellent stability and restoration of leg lengths and offset, with no dislocation, subluxation, or impingement. At this point, the hip was dislocated with the aid of a neck hook.   The femoral trial component was removed. The actual femoral component was   firmly seated. The neck and calcar was inspected.  There was no crack or fracture. I remeasured at this point and, once again, we needed the standard neck length 36 mm diameter biolox  head. The head was then impacted onto a clean, dry trunion. The   acetabulum was inspected. There was no loose body, foreign body, or soft tissue   interposition. The hip was then reduced, brought through range of motion, and   stable as previously described. At this point, the hip was soaked in a dilute betadine solution for three minutes, then irrigated. The soft tissues and capsule were then injected for postoperative pain control. Three small drill holes were made in the proximal femur, and through these drill holes the gluteus minimus was repaired the its insertion. The anterior one-fourth of the gluteus medius was repaired to its tendinous cuff. A looped PDS suture was used to close the iliotibial band, followed by reapproximation of the skin with 2-0 vicryl suture. A running 4-0 monocryl suture was used for skin closure along with steri-strips. Sterile dressings were applied along with an abduction pillow, and the patient was taken to recovery room in stable condition.

## 2022-08-18 NOTE — PLAN OF CARE
Problem: Adjustment to Surgery (Knee Arthroplasty)  Goal: Optimal Coping  Outcome:  Error     Problem: Bleeding (Knee Arthroplasty)  Goal: Absence of Bleeding  Outcome:  Error     Problem: Fluid and Electrolyte Imbalance (Knee Arthroplasty)  Goal: Fluid and Electrolyte Balance  Outcome:  Error     Problem: Functional Ability Impaired (Knee Arthroplasty)  Goal: Optimal Functional Ability  Outcome:  Error     Problem: Functional Ability Impaired (Knee Arthroplasty)  Goal: Optimal Functional Ability  Outcome:  Error     Problem: Infection (Knee Arthroplasty)  Goal: Absence of Infection Signs and Symptoms  Outcome:  Error     Problem: Neurovascular Compromise (Knee Arthroplasty)  Goal: Intact Neurovascular Status  Outcome:  Error     Problem: Ongoing Anesthesia Effects (Knee Arthroplasty)  Goal: Anesthesia/Sedation Recovery  Outcome:  Error     Problem: Pain (Knee Arthroplasty)  Goal: Acceptable Pain Control  Outcome:  Error     Problem: Postoperative Nausea and Vomiting (Knee Arthroplasty)  Goal: Nausea and Vomiting Relief  Outcome:  Error     Problem: Postoperative Urinary Retention (Knee Arthroplasty)  Goal: Effective Urinary Elimination  Outcome:  Error     Problem: Respiratory Compromise (Knee Arthroplasty)  Goal: Effective Oxygenation and Ventilation  Outcome:  Error     Problem: Adjustment to Surgery (Hip Arthroplasty)  Goal: Optimal Coping  Outcome:  Error

## 2022-08-18 NOTE — ANESTHESIA PREPROCEDURE EVALUATION
08/18/2022  Renetta Glover Jr. is a 66 y.o., male with left hip pain and osteoarthritis for replacement.  Other PMH is as outlined here, including HIV+ status.  He is compliant with antiretroviral meds.  He is calm and conversant in holding area, states that he has been feeling well aside from his hip pain.  He denies cardiopulmonary complaints.  He is capable of 4 mets without issues.  EKG is NSR.      Pre-op Assessment    I have reviewed the Patient Summary Reports.     I have reviewed the Nursing Notes. I have reviewed the NPO Status.   I have reviewed the Medications.     Review of Systems  Anesthesia Hx:  No problems with previous Anesthesia  Denies Family Hx of Anesthesia complications.   Denies Personal Hx of Anesthesia complications.   Social:  Smoker    Hematology/Oncology:         -- Immunodeficiency Disorder: Hematology Comments: HIV+   Cardiovascular:   Exercise tolerance: good Hypertension    Pulmonary:  Pulmonary Normal    Renal/:   Chronic Renal Disease, CRI    Hepatic/GI:   GERD        Physical Exam  General: Well nourished, Cooperative, Alert and Oriented    Airway:  Mallampati: II   Mouth Opening: Normal  TM Distance: Normal  Tongue: Normal  Neck ROM: Normal ROM    Dental:  Periodontal disease  Missing front teeth, loose teeth, poor dentition  Chest/Lungs:  Clear to auscultation, Normal Respiratory Rate    Heart:  Rate: Normal  Rhythm: Regular Rhythm        Anesthesia Plan  Type of Anesthesia, risks & benefits discussed:    Anesthesia Type: Spinal, MAC  Intra-op Monitoring Plan: Standard ASA Monitors  Post Op Pain Control Plan: multimodal analgesia  Informed Consent: Informed consent signed with the Patient and all parties understand the risks and agree with anesthesia plan.  All questions answered.   ASA Score: 3  Day of Surgery Review of History & Physical: H&P Update referred to the  surgeon/provider.    Ready For Surgery From Anesthesia Perspective.     .

## 2022-08-18 NOTE — PLAN OF CARE
Problem: Physical Therapy  Goal: Physical Therapy Goal  Description: Pt will improve functional independence by performing:    Bed mobility: SBA  Sit to stand: SBA  Ambulation x 150' with SBA with rolling walker  1 Step (Curb): Min A  with rolling walker  3 Steps: Min A  with B HR  Independent with total hip HEP    Outcome: Ongoing, Progressing

## 2022-08-18 NOTE — PLAN OF CARE
Problem: Adult Inpatient Plan of Care  Goal: Plan of Care Review  Outcome: Ongoing, Progressing  Goal: Patient-Specific Goal (Individualized)  Outcome: Ongoing, Progressing  Goal: Absence of Hospital-Acquired Illness or Injury  Outcome: Ongoing, Progressing  Goal: Optimal Comfort and Wellbeing  Outcome: Ongoing, Progressing  Goal: Readiness for Transition of Care  Outcome: Ongoing, Progressing     Problem: Infection  Goal: Absence of Infection Signs and Symptoms  Outcome: Ongoing, Progressing     Problem: Adjustment to Surgery (Knee Arthroplasty)  Goal: Optimal Coping  Outcome:  Error     Problem: Bleeding (Knee Arthroplasty)  Goal: Absence of Bleeding  Outcome:  Error     Problem: Bowel Motility Impaired (Knee Arthroplasty)  Goal: Effective Bowel Elimination  Outcome:  Error     Problem: Fluid and Electrolyte Imbalance (Knee Arthroplasty)  Goal: Fluid and Electrolyte Balance  Outcome:  Error     Problem: Functional Ability Impaired (Knee Arthroplasty)  Goal: Optimal Functional Ability  Outcome:  Error     Problem: Infection (Knee Arthroplasty)  Goal: Absence of Infection Signs and Symptoms  Outcome:  Error     Problem: Neurovascular Compromise (Knee Arthroplasty)  Goal: Intact Neurovascular Status  Outcome:  Error     Problem: Ongoing Anesthesia Effects (Knee Arthroplasty)  Goal: Anesthesia/Sedation Recovery  Outcome:  Error     Problem: Pain (Knee Arthroplasty)  Goal: Acceptable Pain Control  Outcome:  Error     Problem: Postoperative Nausea and Vomiting (Knee Arthroplasty)  Goal: Nausea and Vomiting Relief  Outcome:  Error     Problem: Postoperative Urinary Retention (Knee Arthroplasty)  Goal: Effective Urinary Elimination  Outcome:  Error     Problem: Respiratory Compromise (Knee Arthroplasty)  Goal: Effective Oxygenation and Ventilation  Outcome:  Error     Problem: Adjustment to Surgery  (Hip Arthroplasty)  Goal: Optimal Coping  Outcome:  Error     Problem: Bleeding (Hip Arthroplasty)  Goal: Absence of Bleeding  Outcome: Ongoing, Progressing     Problem: Bowel Motility Impaired (Hip Arthroplasty)  Goal: Effective Bowel Elimination  Outcome: Ongoing, Progressing     Problem: Fluid and Electrolyte Imbalance (Hip Arthroplasty)  Goal: Fluid and Electrolyte Balance  Outcome: Ongoing, Progressing     Problem: Functional Ability Impaired (Hip Arthroplasty)  Goal: Optimal Functional Ability  Outcome: Ongoing, Progressing     Problem: Infection (Hip Arthroplasty)  Goal: Absence of Infection Signs and Symptoms  Outcome: Ongoing, Progressing     Problem: Neurovascular Compromise (Hip Arthroplasty)  Goal: Intact Neurovascular Status  Outcome: Ongoing, Progressing     Problem: Ongoing Anesthesia Effects (Hip Arthroplasty)  Goal: Anesthesia/Sedation Recovery  Outcome: Ongoing, Progressing     Problem: Pain (Hip Arthroplasty)  Goal: Acceptable Pain Control  Outcome: Ongoing, Progressing     Problem: Postoperative Nausea and Vomiting (Hip Arthroplasty)  Goal: Nausea and Vomiting Relief  Outcome: Ongoing, Progressing     Problem: Postoperative Urinary Retention (Hip Arthroplasty)  Goal: Effective Urinary Elimination  Outcome: Ongoing, Progressing     Problem: Respiratory Compromise (Hip Arthroplasty)  Goal: Effective Oxygenation and Ventilation  Outcome: Ongoing, Progressing     Problem: Hypertension Comorbidity  Goal: Blood Pressure in Desired Range  Outcome: Ongoing, Progressing     Problem: Pain Acute  Goal: Acceptable Pain Control and Functional Ability  Outcome: Ongoing, Progressing     Problem: Fall Injury Risk  Goal: Absence of Fall and Fall-Related Injury  Outcome: Ongoing, Progressing

## 2022-08-19 LAB
ANION GAP SERPL CALC-SCNC: 7 MEQ/L
BUN SERPL-MCNC: 24.2 MG/DL (ref 8.4–25.7)
CALCIUM SERPL-MCNC: 8.3 MG/DL (ref 8.8–10)
CHLORIDE SERPL-SCNC: 108 MMOL/L (ref 98–107)
CO2 SERPL-SCNC: 25 MMOL/L (ref 23–31)
CREAT SERPL-MCNC: 1.78 MG/DL (ref 0.73–1.18)
CREAT/UREA NIT SERPL: 14
ERYTHROCYTE [DISTWIDTH] IN BLOOD BY AUTOMATED COUNT: 14.4 % (ref 11.5–17)
GFR SERPLBLD CREATININE-BSD FMLA CKD-EPI: 42 MLS/MIN/1.73/M2
GLUCOSE SERPL-MCNC: 109 MG/DL (ref 82–115)
HCT VFR BLD AUTO: 30.5 % (ref 42–52)
HGB BLD-MCNC: 10.6 GM/DL (ref 14–18)
MCH RBC QN AUTO: 32.5 PG (ref 27–31)
MCHC RBC AUTO-ENTMCNC: 34.8 MG/DL (ref 33–36)
MCV RBC AUTO: 93.6 FL (ref 80–94)
NRBC BLD AUTO-RTO: 0 %
PLATELET # BLD AUTO: 246 X10(3)/MCL (ref 130–400)
PMV BLD AUTO: 11.1 FL (ref 7.4–10.4)
POTASSIUM SERPL-SCNC: 3.8 MMOL/L (ref 3.5–5.1)
RBC # BLD AUTO: 3.26 X10(6)/MCL (ref 4.7–6.1)
SODIUM SERPL-SCNC: 140 MMOL/L (ref 136–145)
T PALLIDUM AB SER QL: REACTIVE
WBC # SPEC AUTO: 11.3 X10(3)/MCL (ref 4.5–11.5)

## 2022-08-19 PROCEDURE — 80048 BASIC METABOLIC PNL TOTAL CA: CPT | Performed by: SPECIALIST

## 2022-08-19 PROCEDURE — 36415 COLL VENOUS BLD VENIPUNCTURE: CPT | Performed by: SPECIALIST

## 2022-08-19 PROCEDURE — 94761 N-INVAS EAR/PLS OXIMETRY MLT: CPT

## 2022-08-19 PROCEDURE — 63600175 PHARM REV CODE 636 W HCPCS: Performed by: SPECIALIST

## 2022-08-19 PROCEDURE — 97530 THERAPEUTIC ACTIVITIES: CPT

## 2022-08-19 PROCEDURE — 25000003 PHARM REV CODE 250: Performed by: NURSE PRACTITIONER

## 2022-08-19 PROCEDURE — 85027 COMPLETE CBC AUTOMATED: CPT | Performed by: SPECIALIST

## 2022-08-19 PROCEDURE — 97116 GAIT TRAINING THERAPY: CPT

## 2022-08-19 PROCEDURE — 97166 OT EVAL MOD COMPLEX 45 MIN: CPT

## 2022-08-19 PROCEDURE — 11000001 HC ACUTE MED/SURG PRIVATE ROOM

## 2022-08-19 PROCEDURE — 25000003 PHARM REV CODE 250: Performed by: SPECIALIST

## 2022-08-19 PROCEDURE — 94799 UNLISTED PULMONARY SVC/PX: CPT

## 2022-08-19 RX ADMIN — ASPIRIN 81 MG CHEWABLE TABLET 81 MG: 81 TABLET CHEWABLE at 08:08

## 2022-08-19 RX ADMIN — KETOROLAC TROMETHAMINE 15 MG: 30 INJECTION, SOLUTION INTRAMUSCULAR at 12:08

## 2022-08-19 RX ADMIN — METOCLOPRAMIDE 10 MG: 5 INJECTION, SOLUTION INTRAMUSCULAR; INTRAVENOUS at 09:08

## 2022-08-19 RX ADMIN — POLYETHYLENE GLYCOL 3350 17 G: 17 POWDER, FOR SOLUTION ORAL at 08:08

## 2022-08-19 RX ADMIN — HYDROCODONE BITARTRATE AND ACETAMINOPHEN 1 TABLET: 5; 325 TABLET ORAL at 08:08

## 2022-08-19 RX ADMIN — HYDROCODONE BITARTRATE AND ACETAMINOPHEN 1 TABLET: 5; 325 TABLET ORAL at 05:08

## 2022-08-19 RX ADMIN — KETOROLAC TROMETHAMINE 15 MG: 30 INJECTION, SOLUTION INTRAMUSCULAR at 05:08

## 2022-08-19 RX ADMIN — AMLODIPINE BESYLATE 10 MG: 5 TABLET ORAL at 05:08

## 2022-08-19 RX ADMIN — HYDROCODONE BITARTRATE AND ACETAMINOPHEN 1 TABLET: 5; 325 TABLET ORAL at 04:08

## 2022-08-19 RX ADMIN — FAMOTIDINE 20 MG: 20 TABLET ORAL at 09:08

## 2022-08-19 RX ADMIN — ASPIRIN 81 MG CHEWABLE TABLET 81 MG: 81 TABLET CHEWABLE at 09:08

## 2022-08-19 RX ADMIN — HYDROCODONE BITARTRATE AND ACETAMINOPHEN 1 TABLET: 5; 325 TABLET ORAL at 12:08

## 2022-08-19 RX ADMIN — SENNOSIDES AND DOCUSATE SODIUM 2 TABLET: 50; 8.6 TABLET ORAL at 08:08

## 2022-08-19 RX ADMIN — CEFAZOLIN SODIUM 2000 MG: 2 INJECTION, SOLUTION INTRAVENOUS at 03:08

## 2022-08-19 RX ADMIN — METOCLOPRAMIDE 10 MG: 5 INJECTION, SOLUTION INTRAMUSCULAR; INTRAVENOUS at 03:08

## 2022-08-19 RX ADMIN — DOCUSATE SODIUM 200 MG: 100 CAPSULE, LIQUID FILLED ORAL at 05:08

## 2022-08-19 RX ADMIN — GABAPENTIN 300 MG: 300 CAPSULE ORAL at 08:08

## 2022-08-19 RX ADMIN — AMITRIPTYLINE HYDROCHLORIDE 50 MG: 25 TABLET, FILM COATED ORAL at 08:08

## 2022-08-19 RX ADMIN — SENNOSIDES AND DOCUSATE SODIUM 2 TABLET: 50; 8.6 TABLET ORAL at 09:08

## 2022-08-19 RX ADMIN — HYDROCODONE BITARTRATE AND ACETAMINOPHEN 1 TABLET: 5; 325 TABLET ORAL at 09:08

## 2022-08-19 NOTE — PROGRESS NOTES
"No acute events overnight.  Pain controlled.  Resting in bed.    Vital Signs  Temp: 98 °F (36.7 °C)  Temp src: Oral  Pulse: 89  Heart Rate Source: Monitor  Resp: 18  SpO2: 97 %  Pulse Oximetry Type: Intermittent  Oxygen Concentration (%): 80  O2 Device (Oxygen Therapy): room air  BP: (!) 142/79  BP Location: Right arm  BP Method: Automatic  Patient Position: Lying  Height and Weight  Height: 5' 8" (172.7 cm)  Height Method: Stated  Weight: 87.5 kg (192 lb 14.4 oz)  Weight Method: Stated  BSA (Calculated - sq m): 2.05 sq meters  BMI (Calculated): 29.3  Weight in (lb) to have BMI = 25: 164.1]    +FHL/EHL  Brisk capillary refill distally  Dressing c/d/i  Sensation intact to light touch distally    Recent Lab Results       08/19/22  0516        Anion Gap 7.0       BUN 24.2       BUN/CREAT RATIO 14       Calcium 8.3       Chloride 108       CO2 25       Creatinine 1.78       eGFR 42       Glucose 109       Hematocrit 30.5       Hemoglobin 10.6       MCH 32.5       MCHC 34.8       MCV 93.6       MPV 11.1       nRBC 0.0       Platelets 246       Potassium 3.8       RBC 3.26       RDW 14.4       Sodium 140       WBC 11.3             A/P:  Status post left total hip arthroplasty  Overall patient doing well.  Therapy for mobility and ambulation.  Aspirin for DVT PPx  Patient will need placement in [facility] in either swing bed or skilled nursing  "

## 2022-08-19 NOTE — PT/OT/SLP EVAL
"Occupational Therapy   Evaluation    Name: Renetta Glover Jr.  MRN: [id number]  Admitting Diagnosis:  Primary osteoarthritis of left hip  Recent Surgery: Procedure(s) (LRB):  ROBOTIC ARTHROPLASTY,HIP (Left) 1 Day Post-Op    Recommendations:     Discharge Recommendations: nursing facility, skilled  Discharge Equipment Recommendations:  bedside commode (TTB)  Barriers to discharge:  Decreased caregiver support (Time since onset)    Assessment:     Renetta Glover Jr. is a 66 y.o. male with a medical diagnosis of Primary osteoarthritis of left hip. Performance deficits affecting function: decreased safety awareness, pain, orthopedic precautions, impaired self care skills, impaired functional mobility.      Rehab Prognosis: Good; patient would benefit from acute skilled OT services to address these deficits and reach maximum level of function.       Plan:     Patient to be seen  (BID) to address the above listed problems via self-care/home management, therapeutic exercises, therapeutic activities  · Plan of Care Expires: 08/25/22  · Plan of Care Reviewed with: patient    Subjective     Chief Complaint: "I'm in some pain."  Patient/Family Comments/goals: "I want to play cards and Roly's with my friends."    Occupational Profile:  Living Environment: Pt lives alone in a mobile home. There are 3 steps to enter with bilateral rails. Pt uses a tub/sh combo.  Previous level of function: Indep with ADLs and IADLs  Equipment Used at Home:  walker, rolling, cane, straight  Assistance upon Discharge: no one per pt report    Pain/Comfort:  · Pain Rating 1: 9/10  · Location - Side 1: Left  · Location 1: hip  · Pain Addressed 1: Nurse notified, Reposition, Distraction  · Pain Rating Post-Intervention 1: 9/10    Patients cultural, spiritual, Jew conflicts given the current situation: no    Objective:     Communicated with: RN prior to session.  Patient found ambulatory in room/dorsey with one  sock on R foot and compression " sock only on L foot with peripheral IV upon OT entry to room.    General Precautions: Standard, fall   Orthopedic Precautions:LLE partial weight bearing (75%)   Braces: N/A  Respiratory Status: Room air    Occupational Performance:    Functional Mobility/Transfers:  · Patient completed Sit <> Stand Transfer with supervision  with  rolling walker   · Patient completed Toilet Transfer Step Transfer technique with supervision with  rolling walker and grab bars  · Functional Mobility: Short FM in/out bathroom with RW and SBA. Pt then ambulated ~75' with RW and SBA. Pt required maximal verbal cues to adhere to GINA precautions throughout, despite education done upon OT arrival (with verbal feedback from pt regarding what they are).    Activities of Daily Living:  · Lower Body Dressing: supervision for VCs to adhere to GINA precautions with use of AE  · Toileting: supervision for safety to adhere to GINA precautions    Cognitive/Visual Perceptual:  Cognitive/Psychosocial Skills:     -       Follows Commands/attention:Easily distracted  -       Communication: clear/fluent  -       Memory: Impaired STM  -       Safety awareness/insight to disability: impaired     Physical Exam:  Upper Extremity Range of Motion:     -       Right Upper Extremity: WFL  -       Left Upper Extremity: WFL  Upper Extremity Strength:    -       Right Upper Extremity: WFL  -       Left Upper Extremity: WFL    Treatment & Education:  Education:    Patient left supine with call button in reach    GOALS:   Multidisciplinary Problems     Occupational Therapy Goals        Problem: Occupational Therapy    Goal Priority Disciplines Outcome Interventions   Occupational Therapy Goal     OT, PT/OT Ongoing, Progressing    Description: Pt will perform LB dressing c mod I by d/c.  Pt will perform toileting c mod I by d/c.  Pt will perform toilet t/f c mod I by d/c.  Pt will perform tub t/f c SBA c TTB by d/c.  Pt will perform car t/f SBA in adherence to pxns by  d/c.                     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

## 2022-08-19 NOTE — PT/OT/SLP PROGRESS
Physical Therapy Treatment    Patient Name:  Renetta Glover Jr.   MRN:  [id number]    Recommendations:     Discharge Recommendations:  nursing facility, skilled   Discharge Equipment Recommendations: walker, rolling   Barriers to discharge: impaired functional mobility    Assessment:     Renetta Glover Jr. is a 66 y.o. male admitted with a medical diagnosis of Primary osteoarthritis of left hip.  He presents with the following impairments/functional limitations:  impaired functional mobility, weakness, decreased safety awareness, impaired balance, impaired self care skills .    Rehab Prognosis: Good; patient would benefit from acute skilled PT services to address these deficits and reach maximum level of function.    Recent Surgery: Procedure(s) (LRB):  ROBOTIC ARTHROPLASTY,HIP (Left) 1 Day Post-Op    Plan:     During this hospitalization, patient to be seen BID to address the identified rehab impairments via gait training, therapeutic activities, therapeutic exercises and progress toward the following goals:    · Plan of Care Expires:  08/24/22    Subjective     Chief Complaint: L hip pain  Pain/Comfort:  · Pain Rating 1: 6/10  · Location - Side 1: Left  · Location 1: hip  · Pain Addressed 1: Reposition, Distraction, Cessation of Activity      Objective:     Communicated with BARTOLO Hogan prior to session.  Patient found ambulatory in hallway with BARTOLO Hogan after completion of car transfer with   upon PT entry to room.     General Precautions: Standard, fall   Orthopedic Precautions:LLE partial weight bearing   Braces:    Respiratory Status: Room air     Functional Mobility:  · Bed Mobility:     · Sit to Supine: stand by assistance  · Transfers:     · Sit to Stand:  stand by assistance with rolling walker  · Bed to Chair: stand by assistance with  rolling walker  using  Stand Pivot  · Toilet Transfer: stand by assistance with  rolling walker  using  Step Transfer  · Gait: 350' SBA with RW. Slow gait speed, pt exhibiting  "forward flexed posture with occasionally pushing RW out in front of him.      Patient left HOB elevated with call button in reach..    GOALS:   Multidisciplinary Problems     Physical Therapy Goals        Problem: Physical Therapy    Goal Priority Disciplines Outcome Goal Variances Interventions   Physical Therapy Goal     PT, PT/OT Ongoing, Progressing     Description: Pt will improve functional independence by performing:    MET 08/19/22 - Bed mobility: SBA  Initial - Bed mobility: Independent  MET 08/19/22 - Sit to stand: SBA  Initial - Sit<>stand: mod I with RW  MET 08/19/22 - Ambulation x 150' with SBA with rolling walker  Initial - Ambulation 300' mod I with RW  MET 08/19/22 - 1 Step (Curb): Min A  with rolling walker  Initial - 4" curb: mod I with RW  MET 08/19/22 - 3 Steps: Min A  with B HR  Initial - 3 steps: mod I with B railings  MET 08/19/22 - Independent with total hip HEP                     Time Tracking:     PT Received On:    PT Start Time: 1407     PT Stop Time: 1422  PT Total Time (min): 15 min     Billable Minutes: Gait Training 10 min and Therapeutic Activity 5 min    Treatment Type: Treatment  PT/PTA: PT           08/19/2022  "

## 2022-08-19 NOTE — PLAN OF CARE
S/p TKR. Spk w pt -- std he lives alone. Sister, Malena medina. Pt has RW, s.chair, & hip kit from previous surgery. Pt requesting snf stay at [facility] swing bed. Foc obtained.   Called/ faxed referral to [facility]. Will f/u.     PCP: Dr Bird  Rx:  ( [place])    Contact # Malena [phone] ; [phone] cell

## 2022-08-19 NOTE — PLAN OF CARE
Problem: Occupational Therapy  Goal: Occupational Therapy Goal  Description:   Pt will perform LB dressing c mod I by d/c.  Pt will perform toileting c mod I by d/c.  Pt will perform toilet t/f c mod I by d/c.  Pt will perform tub t/f c SBA c TTB by d/c.  Pt will perform car t/f SBA in adherence to pxns by d/c.    Outcome: Ongoing, Progressing

## 2022-08-19 NOTE — PT/OT/SLP PROGRESS
Occupational Therapy   Treatment    Name: Renetta Glover Jr.  MRN: [id number]  Admitting Diagnosis:  Primary osteoarthritis of left hip  1 Day Post-Op    Recommendations:     Discharge Recommendations: home with home health (swing bed)  Discharge Equipment Recommendations:  bedside commode, walker, rolling  Barriers to discharge:  Decreased caregiver support    Assessment:     Renetta Glover Jr. is a 66 y.o. male with a medical diagnosis of Primary osteoarthritis of left hip. Performance deficits affecting function are decreased safety awareness, orthopedic precautions.     Rehab Prognosis:  Good; patient would benefit from acute skilled OT services to address these deficits and reach maximum level of function.       Plan:     Patient to be seen  (BID) to address the above listed problems via self-care/home management, therapeutic activities  · Plan of Care Expires: 08/25/22  · Plan of Care Reviewed with: patient    Subjective     Pain/Comfort:  · Pain Rating 1: 4/10  · Location - Side 1: Left  · Location 1: hip  · Pain Addressed 1: Reposition  · Pain Rating Post-Intervention 1: 4/10    Objective:     Communicated with: RN prior to session.  Patient found supine with legs crossed and peripheral IV upon OT entry to room. Pt required 2 verbal cues within 1 minute for pt to uncross legs.     General Precautions: Standard, fall   Orthopedic Precautions:LLE partial weight bearing   Braces: N/A  Respiratory Status: Room air     Occupational Performance:     Bed Mobility:    · Patient completed Supine to Sit with supervision     Functional Mobility/Transfers:  · Patient completed Sit <> Stand Transfer with supervision  with  rolling walker   · Functional Mobility: Pt ambulated from room 310 > car simulator with RW and SBA. No LOB noted; however VCs provided to walk within RW.  · Patient completed a car transfer with RW and SBA while adhering to hip precautions. Pt would benefit from practicing once more to ensure safety  and carry over of techniques taught from today's treatment.    Treatment & Education:    Patient left with PT Ferdinand at conclusion  Education:      GOALS:   Multidisciplinary Problems     Occupational Therapy Goals        Problem: Occupational Therapy    Goal Priority Disciplines Outcome Interventions   Occupational Therapy Goal     OT, PT/OT Ongoing, Progressing    Description: Pt will perform LB dressing c mod I by d/c.  Pt will perform toileting c mod I by d/c.  Pt will perform toilet t/f c mod I by d/c.  Pt will perform tub t/f c SBA c TTB by d/c.  Pt will perform car t/f SBA in adherence to pxns by d/c.                     Time Tracking:     OT Date of Treatment: 08/19/22  OT Start Time: 1400  OT Stop Time: 1408  OT Total Time (min): 8 min    Billable Minutes:Therapeutic Activity 8    OT/MARVA: OT          8/19/2022

## 2022-08-19 NOTE — PT/OT/SLP PROGRESS
Physical Therapy Treatment    Patient Name:  Renetta Glover Jr.   MRN:  [id number]    Recommendations:     Discharge Recommendations:  nursing facility, skilled   Discharge Equipment Recommendations: walker, rolling   Barriers to discharge: impaired functional mobility    Assessment:     Renetta Glover Jr. is a 66 y.o. male admitted with a medical diagnosis of Primary osteoarthritis of left hip.  He presents with the following impairments/functional limitations:  decreased safety awareness, pain, orthopedic precautions, impaired self care skills, impaired functional mobility .    Rehab Prognosis: Good; patient would benefit from acute skilled PT services to address these deficits and reach maximum level of function.    Recent Surgery: Procedure(s) (LRB):  ROBOTIC ARTHROPLASTY,HIP (Left) 1 Day Post-Op    Plan:     During this hospitalization, patient to be seen BID to address the identified rehab impairments via gait training, therapeutic activities, therapeutic exercises and progress toward the following goals:    · Plan of Care Expires:  08/24/22    Subjective     Chief Complaint: L hip pain  Pain/Comfort:  Pain Rating 1: 6/10  Location - Side 1: Left  Location 1: hip  Pain Addressed 1: Reposition, Distraction, Cessation of Activity      Objective:     Communicated with pt prior to session.  Patient found up in chair with   upon PT entry to room.     General Precautions: Standard, fall   Orthopedic Precautions:LLE partial weight bearing   Braces:    Respiratory Status: Room air     Functional Mobility:  · Bed Mobility:     · Supine to Sit: stand by assistance  · Sit to Supine: stand by assistance  · Transfers:     · Sit to Stand:  stand by assistance with rolling walker  · Bed to Chair: stand by assistance with  rolling walker  using  Stand Pivot  · Gait: 200' x2 completions; SBA with RW. Slow gait speed, pt frequently pushing RW out in front of him with forward flexed posture  · Stairs:  Pt ascended/descended 3  "stair(s) and 4" curb step with Rolling Walker with bilateral handrails with Stand-by Assistance.       AM-PAC 6 CLICK MOBILITY          Therapeutic Activities and Exercises:  HEP reviewed with pt. Pt able to correctly demonstrated each exercise in supine position multiple times. Pt verbalized understanding of HEP.    Patient left up in chair with call button in reach..    GOALS:   Multidisciplinary Problems     Physical Therapy Goals        Problem: Physical Therapy    Goal Priority Disciplines Outcome Goal Variances Interventions   Physical Therapy Goal     PT, PT/OT Ongoing, Progressing     Description: Pt will improve functional independence by performing:    MET 08/19/22 - Bed mobility: SBA  Initial - Bed mobility: Independent  MET 08/19/22 - Sit to stand: SBA  Initial - Sit<>stand: mod I with RW  MET 08/19/22 - Ambulation x 150' with SBA with rolling walker  Initial - Ambulation 300' mod I with RW  MET 08/19/22 - 1 Step (Curb): Min A  with rolling walker  Initial - 4" curb: mod I with RW  MET 08/19/22 - 3 Steps: Min A  with B HR  Initial - 3 steps: mod I with B railings  MET 08/19/22 - Independent with total hip HEP                     Time Tracking:     PT Received On:    PT Start Time: 1015     PT Stop Time: 1045  PT Total Time (min): 30 min     Billable Minutes: Gait Training 25 min and Therapeutic Exercise 5 min    Treatment Type: Treatment  PT/PTA: PT           08/19/2022  "

## 2022-08-19 NOTE — PLAN OF CARE
Problem: Physical Therapy  Goal: Physical Therapy Goal  Description: Pt will improve functional independence by performing:    MET 08/19/22 - Bed mobility: SBA  MET 08/19/22 - Sit to stand: SBA  MET 08/19/22 - Ambulation x 150' with SBA with rolling walker  MET 08/19/22 - 1 Step (Curb): Min A  with rolling walker  MET 08/19/22 - 3 Steps: Min A  with B HR  MET 08/19/22 - Independent with total hip HEP    8/19/2022 1502 by Ferdinand Handy, PT  Reactivated

## 2022-08-19 NOTE — PLAN OF CARE
"  Problem: Physical Therapy  Goal: Physical Therapy Goal  Description: Pt will improve functional independence by performing:    MET 08/19/22 - Bed mobility: SBA  Initial - Bed mobility: Independent  MET 08/19/22 - Sit to stand: SBA  Initial - Sit<>stand: mod I with RW  MET 08/19/22 - Ambulation x 150' with SBA with rolling walker  Initial - Ambulation 300' mod I with RW  MET 08/19/22 - 1 Step (Curb): Min A  with rolling walker  Initial - 4" curb: mod I with RW  MET 08/19/22 - 3 Steps: Min A  with B HR  Initial - 3 steps: mod I with B railings  MET 08/19/22 - Independent with total hip HEP    8/19/2022 1502 by Ferdinand Handy, PT  Outcome: Ongoing, Progressing     "

## 2022-08-20 LAB
ANION GAP SERPL CALC-SCNC: 9 MEQ/L
BUN SERPL-MCNC: 12.3 MG/DL (ref 8.4–25.7)
CALCIUM SERPL-MCNC: 9 MG/DL (ref 8.8–10)
CHLORIDE SERPL-SCNC: 106 MMOL/L (ref 98–107)
CO2 SERPL-SCNC: 26 MMOL/L (ref 23–31)
CREAT SERPL-MCNC: 1.56 MG/DL (ref 0.73–1.18)
CREAT/UREA NIT SERPL: 8
ERYTHROCYTE [DISTWIDTH] IN BLOOD BY AUTOMATED COUNT: 14.6 % (ref 11.5–17)
GFR SERPLBLD CREATININE-BSD FMLA CKD-EPI: 49 MLS/MIN/1.73/M2
GLUCOSE SERPL-MCNC: 103 MG/DL (ref 82–115)
HCT VFR BLD AUTO: 34.2 % (ref 42–52)
HGB BLD-MCNC: 11.5 GM/DL (ref 14–18)
MCH RBC QN AUTO: 31.9 PG (ref 27–31)
MCHC RBC AUTO-ENTMCNC: 33.6 MG/DL (ref 33–36)
MCV RBC AUTO: 94.7 FL (ref 80–94)
NRBC BLD AUTO-RTO: 0 %
PLATELET # BLD AUTO: 257 X10(3)/MCL (ref 130–400)
PMV BLD AUTO: 11.1 FL (ref 7.4–10.4)
POTASSIUM SERPL-SCNC: 4 MMOL/L (ref 3.5–5.1)
RBC # BLD AUTO: 3.61 X10(6)/MCL (ref 4.7–6.1)
SODIUM SERPL-SCNC: 141 MMOL/L (ref 136–145)
WBC # SPEC AUTO: 8.8 X10(3)/MCL (ref 4.5–11.5)

## 2022-08-20 PROCEDURE — 80048 BASIC METABOLIC PNL TOTAL CA: CPT | Performed by: SPECIALIST

## 2022-08-20 PROCEDURE — 25000003 PHARM REV CODE 250: Performed by: NURSE PRACTITIONER

## 2022-08-20 PROCEDURE — 25000003 PHARM REV CODE 250: Performed by: SPECIALIST

## 2022-08-20 PROCEDURE — 94761 N-INVAS EAR/PLS OXIMETRY MLT: CPT

## 2022-08-20 PROCEDURE — 97530 THERAPEUTIC ACTIVITIES: CPT

## 2022-08-20 PROCEDURE — 94799 UNLISTED PULMONARY SVC/PX: CPT

## 2022-08-20 PROCEDURE — 97535 SELF CARE MNGMENT TRAINING: CPT

## 2022-08-20 PROCEDURE — 11000001 HC ACUTE MED/SURG PRIVATE ROOM

## 2022-08-20 PROCEDURE — 36415 COLL VENOUS BLD VENIPUNCTURE: CPT | Performed by: SPECIALIST

## 2022-08-20 PROCEDURE — 85027 COMPLETE CBC AUTOMATED: CPT | Performed by: SPECIALIST

## 2022-08-20 PROCEDURE — 97110 THERAPEUTIC EXERCISES: CPT

## 2022-08-20 PROCEDURE — 97116 GAIT TRAINING THERAPY: CPT

## 2022-08-20 RX ADMIN — HYDROCODONE BITARTRATE AND ACETAMINOPHEN 1 TABLET: 7.5; 325 TABLET ORAL at 05:08

## 2022-08-20 RX ADMIN — HYDROCODONE BITARTRATE AND ACETAMINOPHEN 1 TABLET: 7.5; 325 TABLET ORAL at 09:08

## 2022-08-20 RX ADMIN — SENNOSIDES AND DOCUSATE SODIUM 2 TABLET: 50; 8.6 TABLET ORAL at 08:08

## 2022-08-20 RX ADMIN — FAMOTIDINE 20 MG: 20 TABLET ORAL at 08:08

## 2022-08-20 RX ADMIN — ASPIRIN 81 MG CHEWABLE TABLET 81 MG: 81 TABLET CHEWABLE at 08:08

## 2022-08-20 RX ADMIN — DOCUSATE SODIUM 200 MG: 100 CAPSULE, LIQUID FILLED ORAL at 08:08

## 2022-08-20 RX ADMIN — HYDROCODONE BITARTRATE AND ACETAMINOPHEN 1 TABLET: 5; 325 TABLET ORAL at 01:08

## 2022-08-20 RX ADMIN — POLYETHYLENE GLYCOL 3350 17 G: 17 POWDER, FOR SOLUTION ORAL at 08:08

## 2022-08-20 RX ADMIN — AMITRIPTYLINE HYDROCHLORIDE 50 MG: 25 TABLET, FILM COATED ORAL at 08:08

## 2022-08-20 RX ADMIN — HYDROCODONE BITARTRATE AND ACETAMINOPHEN 1 TABLET: 7.5; 325 TABLET ORAL at 10:08

## 2022-08-20 RX ADMIN — AMLODIPINE BESYLATE 10 MG: 5 TABLET ORAL at 05:08

## 2022-08-20 RX ADMIN — GABAPENTIN 300 MG: 300 CAPSULE ORAL at 08:08

## 2022-08-20 NOTE — PT/OT/SLP PROGRESS
Occupational Therapy   Treatment    Name: Renetta Glover Jr.  MRN: [id number]  Admitting Diagnosis:  Primary osteoarthritis of left hip  2 Days Post-Op    Recommendations:     Discharge Recommendations: nursing facility, skilled  Discharge Equipment Recommendations:  bedside commode, walker, rolling  Barriers to discharge:  Decreased caregiver support    Assessment:     Renetta Glover Jr. is a 66 y.o. male with a medical diagnosis of Primary osteoarthritis of left hip s/p L GINA.  He presents with the following performance deficits affecting function: weakness, impaired endurance, impaired self care skills, impaired functional mobility, gait instability, impaired balance, decreased safety awareness, pain, orthopedic precautions.     Rehab Prognosis:  Good; patient would benefit from acute skilled OT services to address these deficits and reach maximum level of function.       Plan:     Patient to be seen  (BID) to address the above listed problems via self-care/home management, therapeutic activities  · Plan of Care Expires: 08/25/22  · Plan of Care Reviewed with: patient    Subjective     Pain/Comfort:  · Pain Rating 1: 8/10  · Location - Side 1: Left  · Location 1: hip  · Pain Addressed 1: Pre-medicate for activity, Reposition, Distraction, Nurse notified (Ice; Patient did report that his pain was a lot better than in the AM.)    Objective:     Communicated with: Nsg prior to session.  Patient found ambulating with RW with PT for PT session in hallway upon OT entry. PT handoff to OT for OT session.     General Precautions: Standard, fall   Orthopedic Precautions:LLE partial weight bearing (L LE PWB 75% & Hip Precautions)   Braces: N/A  Respiratory Status: Room air     Patient was AOx4 with command following intact & safety awareness impaired due to patient required frequent verbal cues to maintain hip precautions during session.     Occupational Performance:       Functional Mobility/Transfers:  · Patient completed  Sit <> Stand Transfer with stand by assistance  with  rolling walker   · Patient completed Tub Transfer Step Transfer technique with stand by assistance with rolling walker and TTB.   · Patient was educated on proper/safe TTB t/f technique within hip precautions as well as safety awareness in shower environment. Patient performed well & adhered to hip precautions with min verbal cues required to maintain hip precautions. Pain was not a limiting factor in PM session like it was in AM session.   · Functional Mobility: Patient performed functional ambulation with RW in hallway from car simulator gym>OT training bathroom>Room 310 with SBA (~>150 ft.). Patient tolerated well.       [facility] 6 Click ADL:        Patient left up in chair with call button in reach and and ice donned to L hip. Education:      GOALS:   Multidisciplinary Problems     Occupational Therapy Goals        Problem: Occupational Therapy    Goal Priority Disciplines Outcome Interventions   Occupational Therapy Goal     OT, PT/OT Ongoing, Progressing    Description: Pt will perform LB dressing c mod I by d/c.  Pt will perform toileting c mod I by d/c.  Pt will perform toilet t/f c mod I by d/c.  Pt will perform tub t/f c SBA c TTB by d/c. - MET 8/20/2022  Pt will perform tub t/f c Mod I c TTB by d/c.   Pt will perform car t/f SBA in adherence to pxns by d/c.                     Time Tracking:     OT Date of Treatment: 08/20/22  OT Start Time: 1310  OT Stop Time: 1325  OT Total Time (min): 15 min    Billable Minutes:Self Care/Home Management 15    OT/MARVA: OT          8/20/2022

## 2022-08-20 NOTE — PT/OT/SLP PROGRESS
Physical Therapy Inpatient Rehab Treatment    Patient Name:  Renetta Glover Jr.   MRN:  [id number]    Recommendations:     Discharge Recommendations:  home with home health, home   Discharge Equipment Recommendations: walker, rolling, bedside commode   Barriers to discharge: Fall risk, decreased endurance    Assessment:     Renetta Glover Jr. is a 66 y.o. male admitted with a medical diagnosis of Primary osteoarthritis of left hip.  He presents with the following impairments/functional limitations:  weakness, impaired endurance, impaired functional mobility, gait instability, impaired balance, decreased coordination, pain, impaired joint extensibility. Pt tolerated session well, however limited with gait and displayed decreased safety with bed mobility possibly due to increased pain in L hip. Pt required increased verbal cueing for maintaining weight bearing precautions during gait training and keeping RW close for safety.   Rehab Diagnosis:     Recent Surgery: Procedure(s) (LRB):  ROBOTIC ARTHROPLASTY,HIP (Left) 2 Days Post-Op    General Precautions: Standard, fall     Orthopedic Precautions:LLE partial weight bearing (75%)     Braces: N/A    Rehab Prognosis: Good; patient would benefit from acute skilled PT services to address these deficits and reach maximum level of function.      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;  Service: Gastroenterology;  Laterality: N/A;    HIP REPLACEMENT ARTHROPLASTY Right 02/02/2021    ROBOTIC ARTHROPLASTY, HIP Left 8/18/2022    Procedure:  "ROBOTIC ARTHROPLASTY,HIP;  Surgeon: Bairon Bass MD;  Location: [facility];  Service: Orthopedics;  Laterality: Left;       Subjective     Chief Complaint: "I am in a lot of pain today."  Patient/Family Comments/goals: "I am ready to feel better."    Vitals   Vitals at Rest  BP    HR    O2 Sat    Pain      Vitals With Activity  /54      O2 Sat 95%   Pain      Respiratory Status: Room air    Patients cultural, spiritual, Zoroastrian conflicts given the current situation: no      Objective:     Communicated with RN prior to session.  Patient found HOB elevated with peripheral IV  upon PT entry to room.    Pt is Oriented x3 and Alert, Cooperative and Motivated.    Functional Mobility:   · Bed Mobility:     · Rolling Right: independence  · Supine to Sit: contact guard assistance (required guarding so pt did not fall out of bed possibly due to increased pain levels)  · Transfers:     · Sit to Stand:  contact guard assistance with rolling walker  · Bed to Chair: contact guard assistance with  rolling walker  using  Step Transfer  · Gait: Pt ambulates 175ft with RW with Contact Guard Assistance. (decreased L toe clearance and required verbal ceuing for keeping walker close for safety.)  · Impairments contributing to gait deviations include decreased flexibility, impaired motor control, pain, decreased ROM and decreased strength.      Therapeutic Activities and Exercises:  Pt performed 1 set of 10 reps of GINA HEP sitting in recliner. Pt required assistance with SLR and hip abd possibly due to decreased strength and decreased L quad muscle activation.)    Activity Tolerance    Patient left up in chair with all lines intact, call button in reach and RN notified.    Education provided: roles and goals of PT/PTA, transfer training, bed mob, gait training and fall prevention    Expected compliance:    GOALS:   Multidisciplinary Problems     Physical Therapy Goals        Problem: Physical Therapy    Goal Priority " "Disciplines Outcome Goal Variances Interventions   Physical Therapy Goal     PT, PT/OT Ongoing, Progressing     Description: Pt will improve functional independence by performing:    MET 08/19/22 - Bed mobility: SBA  Initial - Bed mobility: Independent  MET 08/19/22 - Sit to stand: SBA  Initial - Sit<>stand: mod I with RW  MET 08/19/22 - Ambulation x 150' with SBA with rolling walker  Initial - Ambulation 300' mod I with RW  MET 08/19/22 - 1 Step (Curb): Min A  with rolling walker  Initial - 4" curb: mod I with RW  MET 08/19/22 - 3 Steps: Min A  with B HR  Initial - 3 steps: mod I with B railings  MET 08/19/22 - Independent with total hip HEP                     Plan:     During this hospitalization, patient to be seen BID to address the identified rehab impairments via gait training, therapeutic activities, therapeutic exercises, neuromuscular re-education and progress toward the following goals:     Plan of Care Expires:  08/24/22    Additional Information:         Time Tracking:     PT Received On: 08/20/22  Time In 0902     Time Out 0925  Total Time 23 min  Therapy Time    Missed Time:    Time Missed due to:      Billable Minutes: Gait Training 13 and Therapeutic Exercise 10    08/20/2022  "

## 2022-08-20 NOTE — PT/OT/SLP PROGRESS
"Physical Therapy Treatment    Patient Name:  Renetta Glover Jr.   MRN:  [id number]    Recommendations:     Discharge Recommendations:  home, home with home health   Discharge Equipment Recommendations: walker, rolling, bedside commode   Barriers to discharge: fall risk, decreased safety awarness     Assessment:     Renetta Glover Jr. is a 66 y.o. male admitted with a medical diagnosis of Primary osteoarthritis of left hip.  He presents with the following impairments/functional limitations:  weakness, gait instability, impaired functional mobility, decreased lower extremity function, pain, decreased safety awareness, decreased ROM, impaired muscle length, impaired joint extensibility, orthopedic precautions. Pt tolerated session better during this session possibly due to decreased pain levels. Pt displayed increased gait distances, however continues to display antalgic gait pattern with decreased L toe clearance and decreased L hip flexion.     Rehab Prognosis: Good; patient would benefit from acute skilled PT services to address these deficits and reach maximum level of function.    Recent Surgery: Procedure(s) (LRB):  ROBOTIC ARTHROPLASTY,HIP (Left) 2 Days Post-Op    Plan:     During this hospitalization, patient to be seen BID to address the identified rehab impairments via gait training, therapeutic activities and progress toward the following goals:    · Plan of Care Expires:  08/24/22    Subjective     Chief Complaint: "I feel better than I did earlier."  Patient/Family Comments/goals: "to get better"  Pain/Comfort:  · Pain Rating 1: 5/10  · Location - Side 1: Left  · Location 1: hip  · Pain Addressed 1: Reposition, Nurse notified  · Pain Rating Post-Intervention 1: 5/10      Objective:     Communicated with RN prior to session.  Patient found up in chair with peripheral IV upon PT entry to room.     General Precautions: Standard, fall   Orthopedic Precautions:LLE partial weight bearing (75%)   Braces: " "N/A  Respiratory Status: Room air     Functional Mobility:  · Transfers:     · Sit to Stand:  stand by assistance with rolling walker   · Car Transfer: contact guard assistance with  rolling walker  using  Step Transfer (for safety, required verbal cueing and set up of passenger seat to maintain proper hip precautions.)  · Gait: 175ft with RW at [facility] (antalgic gait with anterior trunk lean and required verbal cueing for increasing R hip flexion)      Patient left up in chair with all lines intact and OT for hand off present..    GOALS:   Multidisciplinary Problems     Physical Therapy Goals        Problem: Physical Therapy    Goal Priority Disciplines Outcome Goal Variances Interventions   Physical Therapy Goal     PT, PT/OT Ongoing, Progressing     Description: Pt will improve functional independence by performing:    MET 08/19/22 - Bed mobility: SBA  Bed mobility: Independent  MET 08/19/22 - Sit to stand: SBA  Sit<>stand: mod I with RW  MET 08/19/22 - Ambulation x 150' with SBA with rolling walker  Ambulation 300' mod I with RW  MET 08/19/22 - 1 Step (Curb): Min A  with rolling walker  4" curb: mod I with RW  MET 08/19/22 - 3 Steps: Min A  with B HR  3 steps: mod I with B railings  MET 08/19/22 - Independent with total hip HEP                     Time Tracking:     PT Received On: 08/20/22  PT Start Time: 1257     PT Stop Time: 1310  PT Total Time (min): 13 min     Billable Minutes: Therapeutic Activity 13    Treatment Type: Treatment  PT/PTA: PT           08/20/2022  "

## 2022-08-20 NOTE — PLAN OF CARE
Problem: Adult Inpatient Plan of Care  Goal: Plan of Care Review  Outcome: Ongoing, Progressing  Goal: Patient-Specific Goal (Individualized)  Outcome: Ongoing, Progressing  Goal: Absence of Hospital-Acquired Illness or Injury  Outcome: Ongoing, Progressing  Goal: Optimal Comfort and Wellbeing  Outcome: Ongoing, Progressing  Goal: Readiness for Transition of Care  Outcome: Ongoing, Progressing     Problem: Infection  Goal: Absence of Infection Signs and Symptoms  Outcome: Ongoing, Progressing     Problem: Bleeding (Hip Arthroplasty)  Goal: Absence of Bleeding  Outcome: Ongoing, Progressing     Problem: Bowel Motility Impaired (Hip Arthroplasty)  Goal: Effective Bowel Elimination  Outcome: Ongoing, Progressing     Problem: Fluid and Electrolyte Imbalance (Hip Arthroplasty)  Goal: Fluid and Electrolyte Balance  Outcome: Ongoing, Progressing     Problem: Functional Ability Impaired (Hip Arthroplasty)  Goal: Optimal Functional Ability  Outcome: Ongoing, Progressing     Problem: Infection (Hip Arthroplasty)  Goal: Absence of Infection Signs and Symptoms  Outcome: Ongoing, Progressing     Problem: Neurovascular Compromise (Hip Arthroplasty)  Goal: Intact Neurovascular Status  Outcome: Ongoing, Progressing     Problem: Ongoing Anesthesia Effects (Hip Arthroplasty)  Goal: Anesthesia/Sedation Recovery  Outcome: Ongoing, Progressing     Problem: Pain (Hip Arthroplasty)  Goal: Acceptable Pain Control  Outcome: Ongoing, Progressing     Problem: Postoperative Nausea and Vomiting (Hip Arthroplasty)  Goal: Nausea and Vomiting Relief  Outcome: Ongoing, Progressing     Problem: Postoperative Urinary Retention (Hip Arthroplasty)  Goal: Effective Urinary Elimination  Outcome: Ongoing, Progressing     Problem: Respiratory Compromise (Hip Arthroplasty)  Goal: Effective Oxygenation and Ventilation  Outcome: Ongoing, Progressing     Problem: Hypertension Comorbidity  Goal: Blood Pressure in Desired Range  Outcome: Ongoing, Progressing      Problem: Pain Acute  Goal: Acceptable Pain Control and Functional Ability  Outcome: Ongoing, Progressing     Problem: Fall Injury Risk  Goal: Absence of Fall and Fall-Related Injury  Outcome: Ongoing, Progressing

## 2022-08-20 NOTE — PLAN OF CARE
"  Problem: Physical Therapy  Goal: Physical Therapy Goal  Description: Pt will improve functional independence by performing:    MET 08/19/22 - Bed mobility: SBA  Initial - Bed mobility: Independent  MET 08/19/22 - Sit to stand: SBA  Initial - Sit<>stand: mod I with RW  MET 08/19/22 - Ambulation x 150' with SBA with rolling walker  Initial - Ambulation 300' mod I with RW  MET 08/19/22 - 1 Step (Curb): Min A  with rolling walker  Initial - 4" curb: mod I with RW  MET 08/19/22 - 3 Steps: Min A  with B HR  Initial - 3 steps: mod I with B railings  MET 08/19/22 - Independent with total hip HEP    Outcome: Ongoing, Progressing     "

## 2022-08-20 NOTE — PT/OT/SLP PROGRESS
Occupational Therapy   Treatment    Name: Renetta Glover Jr.  MRN: [id number]  Admitting Diagnosis:  Primary osteoarthritis of left hip  2 Days Post-Op    Recommendations:     Discharge Recommendations: nursing facility, skilled  Discharge Equipment Recommendations:  bedside commode, walker, rolling  Barriers to discharge:  Decreased caregiver support    Assessment:     Renetta Glover Jr. is a 66 y.o. male with a medical diagnosis of Primary osteoarthritis of left hip s/p L GINA.  He presents with the following performance deficits affecting function: weakness, impaired endurance, impaired self care skills, impaired functional mobility, gait instability, impaired balance, decreased safety awareness, pain, orthopedic precautions.     Rehab Prognosis:  Good; patient would benefit from acute skilled OT services to address these deficits and reach maximum level of function.       Plan:     Patient to be seen  (BID) to address the above listed problems via self-care/home management, therapeutic activities  · Plan of Care Expires: 08/25/22  · Plan of Care Reviewed with: patient    Subjective     Pain/Comfort:  · Pain Rating 1: 9/10  · Location - Side 1: Left  · Location 1: hip  · Pain Addressed 1: Reposition, Distraction, Nurse notified (Ice)    Objective:     Communicated with: Nsg prior to session.  Patient found up in chair upon OT entry to room.    General Precautions: Standard, fall   Orthopedic Precautions:LLE partial weight bearing (L LE PWB 75% & Hip Precautions)   Braces: N/A  Respiratory Status: Room air    Patient was AOx4 with command following intact. Patient with impaired safety awareness. Patient required mod verbal cues throughout session to maintain hip precautions.      Occupational Performance:       Functional Mobility/Transfers:  · Patient completed Sit <> Stand Transfer with stand by assistance  with  rolling walker   · Patient completed Toilet Transfer Step Transfer technique with contact guard  assistance with  rolling walker and BSC over toilet   · Functional Mobility: Patient performed functional ambulation with RW to<>from bathroom with CGA with slow pace & was limited due to pain in AM session.     Activities of Daily Living:  · Lower Body Dressing: contact guard assistance Patient performed LE dressing to doff/don socks & don/doff pants with use of RW & AE with CGA requried & verbal cues requried to maintain hip precautions & for proper LE dressing technique with AE.   · Toileting: contact guard assistance (-void)       AMPAC 6 Click ADL:        Patient left up in chair with call button in reach and ice donned to L hip.Education:      GOALS:   Multidisciplinary Problems     Occupational Therapy Goals        Problem: Occupational Therapy    Goal Priority Disciplines Outcome Interventions   Occupational Therapy Goal     OT, PT/OT Ongoing, Progressing    Description: Pt will perform LB dressing c mod I by d/c.  Pt will perform toileting c mod I by d/c.  Pt will perform toilet t/f c mod I by d/c.  Pt will perform tub t/f c SBA c TTB by d/c.  Pt will perform car t/f SBA in adherence to pxns by d/c.                     Time Tracking:     OT Date of Treatment: 08/20/22  OT Start Time: 0950  OT Stop Time: 1013  OT Total Time (min): 23 min    Billable Minutes:Self Care/Home Management 23    OT/MARVA: OT          8/20/2022

## 2022-08-20 NOTE — PROGRESS NOTES
"No acute events overnight.  Pain controlled.  Resting in bed.    Vital Signs  Temp: 98.2 °F (36.8 °C)  Temp src: Oral  Pulse: 106  Heart Rate Source: Monitor  Resp: 18  SpO2: 95 %  Pulse Oximetry Type: Intermittent  Oxygen Concentration (%): 80  O2 Device (Oxygen Therapy): room air  BP: (!) 111/59  BP Location: Right arm  BP Method: Automatic  Patient Position: Lying  Height and Weight  Height: 5' 8" (172.7 cm)  Height Method: Stated  Weight: 87.5 kg (192 lb 14.4 oz)  Weight Method: Stated  BSA (Calculated - sq m): 2.05 sq meters  BMI (Calculated): 29.3  Weight in (lb) to have BMI = 25: 164.1]    +FHL/EHL  Brisk capillary refill distally  Dressing c/d/i  Sensation intact to light touch distally    Recent Lab Results       08/20/22  0511        Anion Gap 9.0       BUN 12.3       BUN/CREAT RATIO 8       Calcium 9.0       Chloride 106       CO2 26       Creatinine 1.56       eGFR 49       Glucose 103       Hematocrit 34.2       Hemoglobin 11.5       MCH 31.9       MCHC 33.6       MCV 94.7       MPV 11.1       nRBC 0.0       Platelets 257       Potassium 4.0       RBC 3.61       RDW 14.6       Sodium 141       WBC 8.8             A/P:  Status post left GINA  Overall patient doing well.  Therapy for mobility and ambulation.  aspirin for DVT PPx  Placement Monday    "

## 2022-08-20 NOTE — PLAN OF CARE
"  Problem: Physical Therapy  Goal: Physical Therapy Goal  Description: Pt will improve functional independence by performing:    MET 08/19/22 - Bed mobility: SBA  Bed mobility: Independent  MET 08/19/22 - Sit to stand: SBA  Sit<>stand: mod I with RW  MET 08/19/22 - Ambulation x 150' with SBA with rolling walker  Ambulation 300' mod I with RW  MET 08/19/22 - 1 Step (Curb): Min A  with rolling walker  4" curb: mod I with RW  MET 08/19/22 - 3 Steps: Min A  with B HR  3 steps: mod I with B railings  MET 08/19/22 - Independent with total hip HEP    8/20/2022 1325 by Dominic Blount, PT  Outcome: Ongoing, Progressing  8/20/2022 0930 by Dominic Blount, PT  Outcome: Ongoing, Progressing     "

## 2022-08-21 PROCEDURE — 94799 UNLISTED PULMONARY SVC/PX: CPT

## 2022-08-21 PROCEDURE — 97530 THERAPEUTIC ACTIVITIES: CPT

## 2022-08-21 PROCEDURE — 25000003 PHARM REV CODE 250: Performed by: SPECIALIST

## 2022-08-21 PROCEDURE — 97116 GAIT TRAINING THERAPY: CPT | Mod: CQ

## 2022-08-21 PROCEDURE — 94761 N-INVAS EAR/PLS OXIMETRY MLT: CPT

## 2022-08-21 PROCEDURE — 97535 SELF CARE MNGMENT TRAINING: CPT

## 2022-08-21 PROCEDURE — 25000003 PHARM REV CODE 250: Performed by: NURSE PRACTITIONER

## 2022-08-21 PROCEDURE — 11000001 HC ACUTE MED/SURG PRIVATE ROOM

## 2022-08-21 RX ADMIN — HYDROCODONE BITARTRATE AND ACETAMINOPHEN 1 TABLET: 7.5; 325 TABLET ORAL at 09:08

## 2022-08-21 RX ADMIN — GABAPENTIN 300 MG: 300 CAPSULE ORAL at 08:08

## 2022-08-21 RX ADMIN — AMLODIPINE BESYLATE 10 MG: 5 TABLET ORAL at 05:08

## 2022-08-21 RX ADMIN — POLYETHYLENE GLYCOL 3350 17 G: 17 POWDER, FOR SOLUTION ORAL at 08:08

## 2022-08-21 RX ADMIN — FAMOTIDINE 20 MG: 20 TABLET ORAL at 09:08

## 2022-08-21 RX ADMIN — ASPIRIN 81 MG CHEWABLE TABLET 81 MG: 81 TABLET CHEWABLE at 08:08

## 2022-08-21 RX ADMIN — SENNOSIDES AND DOCUSATE SODIUM 2 TABLET: 50; 8.6 TABLET ORAL at 08:08

## 2022-08-21 RX ADMIN — HYDROCODONE BITARTRATE AND ACETAMINOPHEN 1 TABLET: 7.5; 325 TABLET ORAL at 08:08

## 2022-08-21 RX ADMIN — HYDROCODONE BITARTRATE AND ACETAMINOPHEN 1 TABLET: 7.5; 325 TABLET ORAL at 03:08

## 2022-08-21 RX ADMIN — AMITRIPTYLINE HYDROCHLORIDE 50 MG: 25 TABLET, FILM COATED ORAL at 08:08

## 2022-08-21 RX ADMIN — DOCUSATE SODIUM 200 MG: 100 CAPSULE, LIQUID FILLED ORAL at 09:08

## 2022-08-21 RX ADMIN — ASPIRIN 81 MG CHEWABLE TABLET 81 MG: 81 TABLET CHEWABLE at 09:08

## 2022-08-21 RX ADMIN — SENNOSIDES AND DOCUSATE SODIUM 2 TABLET: 50; 8.6 TABLET ORAL at 09:08

## 2022-08-21 RX ADMIN — HYDROCODONE BITARTRATE AND ACETAMINOPHEN 1 TABLET: 7.5; 325 TABLET ORAL at 05:08

## 2022-08-21 NOTE — PT/OT/SLP PROGRESS
Occupational Therapy   Treatment    Name: Renetta Glover Jr.  MRN: [id number]  Admitting Diagnosis:  Primary osteoarthritis of left hip  3 Days Post-Op    Recommendations:     Discharge Recommendations:  (patient reports that he will be going to a facility in [place] at time of discharge.)  Discharge Equipment Recommendations:  bedside commode, walker, rolling  Barriers to discharge:       Assessment:     Renetta Glover Jr. is a 66 y.o. male with a medical diagnosis of Primary osteoarthritis of left hip.  He presents with decreased strength, limited AROM Left hip due to surgery/ hip precautions.       Rehab Prognosis:  Good; patient would benefit from acute skilled OT services to address these deficits and reach maximum level of function.       Plan:     Patient to be seen  (BID) to address the above listed problems via therapeutic activities  · Plan of Care Expires: 08/25/22  · Plan of Care Reviewed with: patient    Subjective     Pain/Comfort:  · Pain Rating 1: 3/10  · Location - Side 1: Left  · Location 1: hip  · Pain Rating Post-Intervention 1: 1/10  · Location - Side 2: Left (hip)    Objective:     Communicated with: nurse prior to session.  Patient found up in chair with   upon OT entry to room.    General Precautions: Standard, fall (hip precautions)   Orthopedic Precautions:LLE partial weight bearing (L LE PWB 75% & Hip Precautions)   Braces:    Respiratory Status: Room air     Occupational Performance:       Activities of Daily Living:  · Upper Body Dressing: independence no help needed to don shirt.  · Lower Body Dressing: supervision patient donned pants using reacher, maintained all hip precautions.        Treatment & Education:  Reviewed use of adaptive equipment during dressing.    Patient left up in chair with call button in reachEducation:      GOALS:   Multidisciplinary Problems     Occupational Therapy Goals        Problem: Occupational Therapy    Goal Priority Disciplines Outcome  Interventions   Occupational Therapy Goal     OT, PT/OT Ongoing, Progressing    Description: Pt will perform LB dressing c mod I by d/c.  Pt will perform toileting c mod I by d/c.  Pt will perform toilet t/f c mod I by d/c.  Pt will perform tub t/f c SBA c TTB by d/c. - MET 8/20/2022  Pt will perform tub t/f c Mod I c TTB by d/c.   Pt will perform car t/f SBA in adherence to pxns by d/c.                     Time Tracking:     OT Date of Treatment: 08/21/22  OT Start Time: 1240  OT Stop Time: 1255  OT Total Time (min): 15 min    Billable Minutes:Self Care/Home Management 15    OT/MARVA: OT          8/21/2022

## 2022-08-21 NOTE — PT/OT/SLP PROGRESS
Physical Therapy Treatment    Patient Name:  Renetta Glover Jr.   MRN:  [id number]    Recommendations:     Discharge Recommendations:  home, home with home health   Discharge Equipment Recommendations: walker, rolling, bedside commode   Barriers to discharge: None    Assessment:     Renetta Glover Jr. is a 66 y.o. male admitted with a medical diagnosis of Primary osteoarthritis of left hip.  He presents with the following impairments/functional limitations:  Decreased strength, stability, and endurance.    Rehab Prognosis: Good; patient would benefit from acute skilled PT services to address these deficits and reach maximum level of function.    Recent Surgery: Procedure(s) (LRB):  ROBOTIC ARTHROPLASTY,HIP (Left) 3 Days Post-Op    Plan:     During this hospitalization, patient to be seen BID to address the identified rehab impairments via gait training, therapeutic activities and progress toward the following goals:    · Plan of Care Expires:  08/24/22    Subjective     Chief Complaint: Pt states that he is feeling good today with decreased pain  Pain/Comfort:  · Pain Rating 1: 3/10  · Location - Side 1: Left  · Location 1: hip  · Pain Addressed 1: Pre-medicate for activity, Reposition      Objective:     Communicated with NSG prior to session.  Patient found supine upon PT entry to room.     General Precautions: Standard, fall   Orthopedic Precautions:LLE partial weight bearing (75%)   Braces: N/A  Respiratory Status: Room air     Functional Mobility:  · Gait: Pt ambulated for 350 ft with RW and standing rest breaks taken. VC's provided for upright posture d/t forward lean.       Therapeutic Activities and Exercises:   Pt performed GINA exercise protocol x10 reps each    Patient left up in chair with call button in reach and OT present..    GOALS:   Multidisciplinary Problems     Physical Therapy Goals        Problem: Physical Therapy    Goal Priority Disciplines Outcome Goal Variances Interventions   Physical  "Therapy Goal     PT, PT/OT Ongoing, Progressing     Description: Pt will improve functional independence by performing:    MET 08/19/22 - Bed mobility: SBA  Bed mobility: Independent  MET 08/19/22 - Sit to stand: SBA  Sit<>stand: mod I with RW  MET 08/19/22 - Ambulation x 150' with SBA with rolling walker  Ambulation 300' mod I with RW  MET 08/19/22 - 1 Step (Curb): Min A  with rolling walker  4" curb: mod I with RW  MET 08/19/22 - 3 Steps: Min A  with B HR  3 steps: mod I with B railings  MET 08/19/22 - Independent with total hip HEP                     Time Tracking:     PT Received On:    PT Start Time: 0833     PT Stop Time: 0849  PT Total Time (min): 16 min     Billable Minutes: Gait Training 10 and Therapeutic Exercise 6    Treatment Type: Treatment  PT/PTA: PTA     PTA Visit Number: 1     08/21/2022  "

## 2022-08-21 NOTE — PT/OT/SLP PROGRESS
"Occupational Therapy Inpatient Rehab Treatment    Name: Renetta Glover Jr.  MRN: [id number]    Assessment:  Renetta Glover Jr. is a 66 y.o. male admitted with a medical diagnosis of Primary osteoarthritis of left hip.  He presents with the following impairments/functional limitations:    reduced strength, endurance, and ADL function. .    General Precautions: Standard, fall (hip precautions)     Orthopedic Precautions:LLE partial weight bearing (L LE PWB 75% & Hip Precautions)     Braces: N/A    Rehab Prognosis: Good and Fair; patient would benefit from acute skilled OT services to address these deficits and reach maximum level of function.      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    History of syphilis 05/12/2022    Human immunodeficiency virus (HIV) disease     Hypertension        Past Surgical History:   Procedure Laterality Date    ESOPHAGOGASTRODUODENOSCOPY N/A 6/20/2022    Procedure: EGD (ESOPHAGOGASTRODUODENOSCOPY);  Surgeon: Emile Tang MD;  [facility];  Service: Gastroenterology;  Laterality: N/A;    HIP REPLACEMENT ARTHROPLASTY Right 02/02/2021    ROBOTIC ARTHROPLASTY, HIP Left 8/18/2022    Procedure: ROBOTIC ARTHROPLASTY,HIP;  Surgeon: Bairon Bass MD;  Location: [facility];  Service: Orthopedics;  Laterality: Left;       Subjective     Orientation: Oriented x4    Chief Complaint: no concerns reported    Patient/Family Comments/goals: "to get better"    Vitals   Vitals at Rest  BP    HR    O2 Sat    Pain      Vitals With Activity  BP    HR    O2 Sat    Pain      Respiratory Status: Room air    Patients cultural, spiritual, Evangelical conflicts given the current situation: no       Objective:     Patient found up in chair with   upon OT entry to room.    Mobility   Patient completed:  Sit to Stand Transfer with stand by assistance with rolling " walker and gait belt    Functional Mobility  Patient ambulated in the room  to the sink to perform oral hygiene.      ADLs   Current Status   Eating     Oral Hygiene  patient brushed teeth up in standing with support of RW in front of him.  Patient independent with putting toothpaste onto toothbrush.     Shower, Bathe Self     Upper Body Dressing     Lower Body Dressing     Toileting Hygiene     Toilet Transfer     Putting On, Taking Off Footwear       Limiting Factors for ADLs: endurance, limited ROM, weakness and safety awareness       Therapeutic Activities  Patient transferred sit to stand from recliner with SBA.  Ambulated to the sink and performed oral hygiene with SBA.  Upon completion of ADL task, patient ambulated back to recliner.            LifeStyle Change and Education:             Patient left with call bell in reach. with call button in reach.     Education provided: transfer training, body mechanics, safety precautions, fall prevention and post-op precautions    Multidisciplinary Problems     Occupational Therapy Goals        Problem: Occupational Therapy    Goal Priority Disciplines Outcome Interventions   Occupational Therapy Goal     OT, PT/OT Ongoing, Progressing    Description: Pt will perform LB dressing c mod I by d/c.  Pt will perform toileting c mod I by d/c.  Pt will perform toilet t/f c mod I by d/c.  Pt will perform tub t/f c SBA c TTB by d/c. - MET 8/20/2022  Pt will perform tub t/f c Mod I c TTB by d/c.   Pt will perform car t/f SBA in adherence to pxns by d/c.                     Time Tracking     OT Received On: 08/21/22  Time In 0840     Time Out 0900  Total Time 20 min  Therapy Time: OT Individual: 20  Missed Time:    Missed Time Reason:      Billable Minutes: Therapeutic Activity 20 08/21/2022

## 2022-08-21 NOTE — PT/OT/SLP PROGRESS
Physical Therapy Treatment    Patient Name:  Renetta Glover Jr.   MRN:  [id number]    Recommendations:     Discharge Recommendations:  home, home with home health   Discharge Equipment Recommendations: walker, rolling, bedside commode   Barriers to discharge: None    Assessment:     Renetta Glover Jr. is a 66 y.o. male admitted with a medical diagnosis of Primary osteoarthritis of left hip.  He presents with the following impairments/functional limitations:  Decreased strength and endurance.    Rehab Prognosis: Good; patient would benefit from acute skilled PT services to address these deficits and reach maximum level of function.    Recent Surgery: Procedure(s) (LRB):  ROBOTIC ARTHROPLASTY,HIP (Left) 3 Days Post-Op    Plan:     During this hospitalization, patient to be seen BID to address the identified rehab impairments via gait training, therapeutic activities and progress toward the following goals:    · Plan of Care Expires:  08/24/22    Subjective     Chief Complaint: Pt states that he is feeling good this afternoon  Pain/Comfort:  · Pain Rating 1: 3/10  · Location - Side 1: Left  · Location 1: hip  · Pain Addressed 1: Pre-medicate for activity      Objective:     Communicated with NSG prior to session.  Patient found up in chair upon PT entry to room.     General Precautions: Standard, fall   Orthopedic Precautions:LLE partial weight bearing (75%)   Braces: N/A  Respiratory Status: Room air     Functional Mobility:  · Gait: Pt ambulated for 360 ft using RW with vc's provided for upright posture d/t forward lean with occasional standing rest breaks needed.     Therapeutic Activities and Exercises:   Pt performed GINA exercise protocol for 10 reps each    Patient left up in chair with call button in reach..    GOALS:   Multidisciplinary Problems     Physical Therapy Goals        Problem: Physical Therapy    Goal Priority Disciplines Outcome Goal Variances Interventions   Physical Therapy Goal     PT, PT/OT  "Ongoing, Progressing     Description: Pt will improve functional independence by performing:    MET 08/19/22 - Bed mobility: SBA  Bed mobility: Independent  MET 08/19/22 - Sit to stand: SBA  Sit<>stand: mod I with RW  MET 08/19/22 - Ambulation x 150' with SBA with rolling walker  Ambulation 300' mod I with RW  MET 08/19/22 - 1 Step (Curb): Min A  with rolling walker  4" curb: mod I with RW  MET 08/19/22 - 3 Steps: Min A  with B HR  3 steps: mod I with B railings  MET 08/19/22 - Independent with total hip HEP                     Time Tracking:     PT Received On:    PT Start Time: 1327     PT Stop Time: 1342  PT Total Time (min): 15 min     Billable Minutes: Gait Training 10 and Therapeutic Exercise 5    Treatment Type: Treatment  PT/PTA: PTA     PTA Visit Number: 2     08/21/2022  "

## 2022-08-21 NOTE — PROGRESS NOTES
"No acute events overnight.  Pain controlled.  Resting in bed.     Vital Signs  Temp: 98 °F (36.7 °C)  Temp src: Oral  Pulse: 92  Heart Rate Source: Monitor  Resp: 18  SpO2: 96 %  Pulse Oximetry Type: Intermittent  Oxygen Concentration (%): 80  O2 Device (Oxygen Therapy): room air  BP: 132/61  BP Location: Right arm  BP Method: Automatic  Patient Position: Lying  Height and Weight  Height: 5' 8" (172.7 cm)  Height Method: Stated  Weight: 87.5 kg (192 lb 14.4 oz)  Weight Method: Stated  BSA (Calculated - sq m): 2.05 sq meters  BMI (Calculated): 29.3  Weight in (lb) to have BMI = 25: 164.1]    +FHL/EHL  BCR distally  Dressing c/d/i  SILT distally    Recent Lab Results     None          A/P:  Status post GINA  Pain controlled  Overall patient doing well.  Therapy for mobility and ambulation.  DVT PPx  Placement at some point  "

## 2022-08-21 NOTE — PT/OT/SLP PROGRESS
Occupational Therapy   Treatment    Name: Renetta Glover Jr.  MRN: [id number]  Admitting Diagnosis:  Primary osteoarthritis of left hip  3 Days Post-Op    Recommendations:     Discharge Recommendations:  (patient reports that he will be going to a facility in [place] at time of discharge.)  Discharge Equipment Recommendations:  bedside commode, walker, rolling  Barriers to discharge:       Assessment:     Renetta Glover Jr. is a 66 y.o. male with a medical diagnosis of Primary osteoarthritis of left hip.  He presents with reduced strength, endurance, and ADL function. Performance deficits affecting function are  decreased AROM and strength.     Rehab Prognosis:  Good and Fair; patient would benefit from acute skilled OT services to address these deficits and reach maximum level of function.       Plan:     Patient to be seen  (BID) to address the above listed problems via therapeutic activities  · Plan of Care Expires: 08/25/22  · Plan of Care Reviewed with: patient    Subjective     Pain/Comfort:  · Pain Rating Post-Intervention 1: 1/10  · Location - Side 2: Left (hip)    Objective:     Communicated with: nurse prior to session.  Patient found up in chair with   upon OT entry to room.    General Precautions: Standard, fall (hip precautions)   Orthopedic Precautions:LLE partial weight bearing (L LE PWB 75% & Hip Precautions)   Braces:    Respiratory Status: Room air     Occupational Performance:     Functional Mobility/Transfers:  · Patient completed Sit <> Stand Transfer with stand by assistance  with  rolling walker and gait belt   · Functional Mobility: patient ambulated from chair to sink for oral hygiene.    Activities of Daily Living:  · Grooming: modified independence patient only required set up to brush teeth.        Treatment & Education:  Reviewed hip precautions with patient. Following oral hygiene he ambulated back to the chair.     Patient left up in chair with call button in  reachEducation:      GOALS:   Multidisciplinary Problems     Occupational Therapy Goals        Problem: Occupational Therapy    Goal Priority Disciplines Outcome Interventions   Occupational Therapy Goal     OT, PT/OT Ongoing, Progressing    Description: Pt will perform LB dressing c mod I by d/c.  Pt will perform toileting c mod I by d/c.  Pt will perform toilet t/f c mod I by d/c.  Pt will perform tub t/f c SBA c TTB by d/c. - MET 8/20/2022  Pt will perform tub t/f c Mod I c TTB by d/c.   Pt will perform car t/f SBA in adherence to pxns by d/c.                     Time Tracking:     OT Date of Treatment: 08/21/22  OT Start Time: 0840  OT Stop Time: 0900  OT Total Time (min): 20 min    Billable Minutes:Therapeutic Activity 20    OT/MARVA: OT          8/21/2022

## 2022-08-22 VITALS
WEIGHT: 192.88 LBS | SYSTOLIC BLOOD PRESSURE: 146 MMHG | HEART RATE: 90 BPM | TEMPERATURE: 98 F | HEIGHT: 68 IN | OXYGEN SATURATION: 97 % | RESPIRATION RATE: 16 BRPM | DIASTOLIC BLOOD PRESSURE: 76 MMHG | BODY MASS INDEX: 29.23 KG/M2

## 2022-08-22 LAB — SARS-COV-2 RNA RESP QL NAA+PROBE: NOT DETECTED

## 2022-08-22 PROCEDURE — 97530 THERAPEUTIC ACTIVITIES: CPT

## 2022-08-22 PROCEDURE — 87635 SARS-COV-2 COVID-19 AMP PRB: CPT | Performed by: SPECIALIST

## 2022-08-22 PROCEDURE — 94799 UNLISTED PULMONARY SVC/PX: CPT

## 2022-08-22 PROCEDURE — 94761 N-INVAS EAR/PLS OXIMETRY MLT: CPT

## 2022-08-22 PROCEDURE — 25000003 PHARM REV CODE 250: Performed by: SPECIALIST

## 2022-08-22 PROCEDURE — 97116 GAIT TRAINING THERAPY: CPT

## 2022-08-22 PROCEDURE — 25000003 PHARM REV CODE 250: Performed by: NURSE PRACTITIONER

## 2022-08-22 RX ORDER — POLYETHYLENE GLYCOL 3350 17 G/17G
17 POWDER, FOR SOLUTION ORAL DAILY
Qty: 14 EACH
Start: 2022-08-22 | End: 2022-09-05

## 2022-08-22 RX ORDER — ASPIRIN 81 MG/1
81 TABLET ORAL 2 TIMES DAILY
Qty: 84 TABLET | Refills: 0
Start: 2022-08-22 | End: 2024-03-26

## 2022-08-22 RX ORDER — METHOCARBAMOL 750 MG/1
750 TABLET, FILM COATED ORAL EVERY 6 HOURS
Qty: 56 TABLET | Refills: 0 | Status: SHIPPED | OUTPATIENT
Start: 2022-08-22 | End: 2022-09-05

## 2022-08-22 RX ORDER — HYDROCODONE BITARTRATE AND ACETAMINOPHEN 7.5; 325 MG/1; MG/1
1 TABLET ORAL EVERY 4 HOURS PRN
Qty: 42 TABLET | Refills: 0 | Status: SHIPPED | OUTPATIENT
Start: 2022-08-22 | End: 2022-08-29

## 2022-08-22 RX ADMIN — ASPIRIN 81 MG CHEWABLE TABLET 81 MG: 81 TABLET CHEWABLE at 08:08

## 2022-08-22 RX ADMIN — HYDROCODONE BITARTRATE AND ACETAMINOPHEN 1 TABLET: 7.5; 325 TABLET ORAL at 08:08

## 2022-08-22 RX ADMIN — HYDROCODONE BITARTRATE AND ACETAMINOPHEN 1 TABLET: 7.5; 325 TABLET ORAL at 04:08

## 2022-08-22 RX ADMIN — HYDROCODONE BITARTRATE AND ACETAMINOPHEN 1 TABLET: 7.5; 325 TABLET ORAL at 12:08

## 2022-08-22 RX ADMIN — FAMOTIDINE 20 MG: 20 TABLET ORAL at 08:08

## 2022-08-22 RX ADMIN — DOCUSATE SODIUM 200 MG: 100 CAPSULE, LIQUID FILLED ORAL at 08:08

## 2022-08-22 RX ADMIN — EMTRICITABINE AND TENOFOVIR ALAFENAMIDE 1 TABLET: 200; 25 TABLET ORAL at 09:08

## 2022-08-22 RX ADMIN — HYDROCODONE BITARTRATE AND ACETAMINOPHEN 1 TABLET: 7.5; 325 TABLET ORAL at 01:08

## 2022-08-22 RX ADMIN — DOLUTEGRAVIR SODIUM 50 MG: 50 TABLET, FILM COATED ORAL at 09:08

## 2022-08-22 RX ADMIN — SENNOSIDES AND DOCUSATE SODIUM 2 TABLET: 50; 8.6 TABLET ORAL at 08:08

## 2022-08-22 NOTE — PT/OT/SLP PROGRESS
Occupational Therapy   Treatment    Name: Renetta Glover Jr.  MRN: [id number]  Admitting Diagnosis:  Primary osteoarthritis of left hip  4 Days Post-Op    Recommendations:     Discharge Recommendations: nursing facility, skilled (swing bed)  Discharge Equipment Recommendations:  bedside commode, walker, rolling  Barriers to discharge:  Decreased caregiver support    Assessment:     Renetta Glover Jr. is a 66 y.o. male with a medical diagnosis of Primary osteoarthritis of left hip. Performance deficits affecting function are impaired self care skills, impaired functional mobility, decreased safety awareness, orthopedic precautions.     Rehab Prognosis:  Good; patient would benefit from acute skilled OT services to address these deficits and reach maximum level of function.       Plan:     Patient to be seen  (BID) to address the above listed problems via community/work re-entry, self-care/home management, therapeutic exercises  · Plan of Care Expires: 08/25/22  · Plan of Care Reviewed with: patient    Subjective     Pain/Comfort:  · Pain Rating 1: 7/10  · Location - Side 1: Left  · Location 1: hip  · Pain Addressed 1: Reposition, Distraction    Objective:     Communicated with: RN prior to session.  Patient found supine with peripheral IV upon OT entry to room.    General Precautions: Standard, fall   Orthopedic Precautions:LLE partial weight bearing (75%)   Braces: N/A  Respiratory Status: Room air     Occupational Performance:     Bed Mobility:    · Patient completed Supine to Sit with modified independence     Functional Mobility/Transfers:  · Patient completed Sit <> Stand Transfer with supervision  with  rolling walker   · Patient completed Bed <> Chair Transfer using Step Transfer technique with supervision with rolling walker  · Patient completed Toilet Transfer Step Transfer technique with supervision, rolling walker and bedside commode  · Functional Mobility: FM from room 310 <> car simulator with RW and  SBA.  · Patient completed car transfer with supervision and RW. Pt able to adhere to 3/3 hip precautions throughout.    Activities of Daily Living:  · Lower Body Dressing: supervision with use of AE    Treatment & Education:    Patient left up in chair with call button in reachEducation:      GOALS:   Multidisciplinary Problems     Occupational Therapy Goals        Problem: Occupational Therapy    Goal Priority Disciplines Outcome Interventions   Occupational Therapy Goal     OT, PT/OT Ongoing, Progressing    Description: Pt will perform LB dressing c mod I by d/c.  Pt will perform toileting c mod I by d/c.  Pt will perform toilet t/f c mod I by d/c.  Pt will perform tub t/f c SBA c TTB by d/c. - MET 8/20/2022  Pt will perform tub t/f c Mod I c TTB by d/c.   Pt will perform car t/f SBA in adherence to pxns by d/c.                     Time Tracking:     OT Date of Treatment: 08/22/22  OT Start Time: 0917  OT Stop Time: 0932  OT Total Time (min): 15 min    Billable Minutes:Therapeutic Activity 15    OT/MARVA: OT          8/22/2022

## 2022-08-22 NOTE — NURSING
Report given to rolando pollard. Patient verbalized understanding of discharge. Patient stable and ready for discharge.

## 2022-08-22 NOTE — PLAN OF CARE
"  Problem: Physical Therapy  Goal: Physical Therapy Goal  Description: Pt will improve functional independence by performing:    MET 08/19/22 - Bed mobility: SBA  NOT MET - Bed mobility: Independent  MET 08/19/22 - Sit to stand: SBA  NOT MET - Sit<>stand: mod I with RW  MET 08/19/22 - Ambulation x 150' with SBA with rolling walker  NOT MET - Ambulation 300' mod I with RW  MET 08/19/22 - 1 Step (Curb): Min A  with rolling walker  NOT MET - 4" curb: mod I with RW  MET 08/19/22 - 3 Steps: Min A  with B HR  NOT MET - 3 steps: mod I with B railings  MET 08/19/22 - Independent with total hip HEP    Outcome: Adequate for Care Transition     "

## 2022-08-22 NOTE — PLAN OF CARE
Problem: Adult Inpatient Plan of Care  Goal: Plan of Care Review  Outcome: Met  Goal: Patient-Specific Goal (Individualized)  Outcome: Met  Goal: Absence of Hospital-Acquired Illness or Injury  Outcome: Met  Goal: Optimal Comfort and Wellbeing  Outcome: Met  Goal: Readiness for Transition of Care  Outcome: Met     Problem: Infection  Goal: Absence of Infection Signs and Symptoms  Outcome: Met     Problem: Bleeding (Hip Arthroplasty)  Goal: Absence of Bleeding  Outcome: Met     Problem: Bowel Motility Impaired (Hip Arthroplasty)  Goal: Effective Bowel Elimination  Outcome: Met     Problem: Fluid and Electrolyte Imbalance (Hip Arthroplasty)  Goal: Fluid and Electrolyte Balance  Outcome: Met     Problem: Functional Ability Impaired (Hip Arthroplasty)  Goal: Optimal Functional Ability  Outcome: Met     Problem: Infection (Hip Arthroplasty)  Goal: Absence of Infection Signs and Symptoms  Outcome: Met     Problem: Neurovascular Compromise (Hip Arthroplasty)  Goal: Intact Neurovascular Status  Outcome: Met     Problem: Ongoing Anesthesia Effects (Hip Arthroplasty)  Goal: Anesthesia/Sedation Recovery  Outcome: Met     Problem: Pain (Hip Arthroplasty)  Goal: Acceptable Pain Control  Outcome: Met     Problem: Postoperative Nausea and Vomiting (Hip Arthroplasty)  Goal: Nausea and Vomiting Relief  Outcome: Met     Problem: Postoperative Urinary Retention (Hip Arthroplasty)  Goal: Effective Urinary Elimination  Outcome: Met     Problem: Respiratory Compromise (Hip Arthroplasty)  Goal: Effective Oxygenation and Ventilation  Outcome: Met     Problem: Hypertension Comorbidity  Goal: Blood Pressure in Desired Range  Outcome: Met     Problem: Pain Acute  Goal: Acceptable Pain Control and Functional Ability  Outcome: Met     Problem: Fall Injury Risk  Goal: Absence of Fall and Fall-Related Injury  Outcome: Met

## 2022-08-22 NOTE — PLAN OF CARE
F/u with [facility] - gareth Gordon. Pt accepted. Transfer today. Need neg covid.   Gareth alexander pt -- verb under & agree.   Await dc orders.   Handoff communication nurse Maria Teresa.

## 2022-08-22 NOTE — PT/OT/SLP PROGRESS
"Physical Therapy Treatment    Patient Name:  Renetta Glover Jr.   MRN:  [id number]    Recommendations:     Discharge Recommendations:  home, home with home health   Discharge Equipment Recommendations: walker, rolling, bedside commode   Barriers to discharge: impaired functional mobility    Assessment:     Renetta Glover Jr. is a 66 y.o. male admitted with a medical diagnosis of Primary osteoarthritis of left hip.  He presents with the following impairments/functional limitations:  impaired functional mobility, weakness, decreased safety awareness, impaired balance, impaired self care skills .    Rehab Prognosis: Good; patient would benefit from acute skilled PT services to address these deficits and reach maximum level of function.    Recent Surgery: Procedure(s) (LRB):  ROBOTIC ARTHROPLASTY,HIP (Left) 4 Days Post-Op    Plan:     During this hospitalization, patient to be seen BID to address the identified rehab impairments via gait training, therapeutic activities and progress toward the following goals:    · Plan of Care Expires:  08/24/22    Subjective     Chief Complaint: L hip pain  Patient/Family Comments/goals: "to get moving better"  Pain/Comfort:  · Pain Rating 1: 7/10  · Location - Side 1: Left  · Location 1: hip  · Pain Addressed 1: Reposition, Distraction, Cessation of Activity      Objective:     Communicated with pt prior to session.  Patient found up in chair with   upon PT entry to room.     General Precautions: Standard, fall   Orthopedic Precautions:LLE partial weight bearing (75% PWB)   Braces:    Respiratory Status: Room air     Functional Mobility:  · Transfers:     · Sit to Stand:  stand by assistance with rolling walker  · Bed to Chair: stand by assistance with  rolling walker  using  Stand Pivot  · Gait: 400' SBA with RW. Slow speed, occasional VCs needed for upright posture        Patient left up in chair with call button in reach..    GOALS:   Multidisciplinary Problems     Physical " "Therapy Goals        Problem: Physical Therapy    Goal Priority Disciplines Outcome Goal Variances Interventions   Physical Therapy Goal     PT, PT/OT Adequate for Care Transition     Description: Pt will improve functional independence by performing:    MET 08/19/22 - Bed mobility: SBA  NOT MET - Bed mobility: Independent  MET 08/19/22 - Sit to stand: SBA  NOT MET - Sit<>stand: mod I with RW  MET 08/19/22 - Ambulation x 150' with SBA with rolling walker  NOT MET - Ambulation 300' mod I with RW  MET 08/19/22 - 1 Step (Curb): Min A  with rolling walker  NOT MET - 4" curb: mod I with RW  MET 08/19/22 - 3 Steps: Min A  with B HR  NOT MET - 3 steps: mod I with B railings  MET 08/19/22 - Independent with total hip HEP                     Time Tracking:     PT Received On:    PT Start Time: 0935     PT Stop Time: 0950  PT Total Time (min): 15 min     Billable Minutes: Gait Training 15 min    Treatment Type: Treatment  PT/PTA: PT     PTA Visit Number: 2       Pt d/c'd from acute care PT services. This note to count as d/c summary. All goals updated as appropriate.      08/22/2022  "

## 2022-08-22 NOTE — DISCHARGE INSTRUCTIONS
[facility]  [address]  [place] [address]  Phone [phone]       /      Fax [phone]  SURGEON: Dr. Bass    After discharge, all questions or concerns should be handled at your surgeon's office ([phone]). If it is a weekend or after hours, you will get the surgeon on call.     Discharge Medications:    PAIN MANAGEMENT: Next Dose Available               Robaxin/Methocarbamol 750mg (Muscle Relaxer) - Every 6-8 hours AS NEEDED for muscle spasms, thigh pain or additional pain control ***                       Norco 7.5/325mg (Hydrocodone/Acetaminophen) (Pain Med) - every 4-6 hours AS NEEDED for pain ***   COMPLICATION PREVENTION MEDS: Next Dose DUE   Aspirin 81mg twice a day for 6 weeks post-op for blood clot prevention PM on 8/22/22   MiraLAX 17gm - once or twice a day while on narcotics and muscle relaxers for constipation prevention PM on 8/22/22                     Total Hip Replacement      PAIN MEDICATIONS/PAIN MANAGEMENT:  Norco 7.5/325mg (Hydrocodone/Acetaminophen) (pain pill) - You can take 1 tablet every 4-6 hours for pain. If the pain is mild, take 1/2 of a pill. Once you start taking a half of a pain pill, you can take a Tylenol 325mg with each dose for a little extra pain relief without side effects. Gradually decrease the use as the pain lessens. As you decrease the Norco, increase the Tylenol.      Robaxin/Methocarbamol 750mg (muscle relaxer)- you can take every 6-8 hours as needed for muscle spasms, thigh pain and stiffness, additional pain control or breakthrough pain medications. This medication is helpful for pain control while lessening your need for narcotics. Please reduce the use gradually as the pain and spasms lessen. DO NOT TAKE AT THE SAME TIME AS A PAIN PILL. YOU WILL BE BETTER SERVED WITH 2 HOURS BETWEEN PAIN PILL AND MUSCLE RELAXER.         Use the medication log in your discharge packet to keep track of your  medications.      ***Other things that help with pain control is WALKING, COMPRESSION WRAP, ICE and ELEVATION!!***    If you start having a lot of thigh pain and swelling, wrap your leg with an ace wrap. Start at the top of the CHRISTOPHER hose stockings and wrap as high up as possible.      BLOOD CLOT PREVENTION:   Aspirin 81mg twice a day for 6 weeks. Start on the evening of 8/22/22.  Stop 10/3/22.  If you were taking Baby Aspirin 81mg prior to surgery, may return to daily dose AFTER 6 weeks - 10/4/22.  You need to continuing wearing your compression stocking (CHRISTOPHER Hose - ThromboEmbolic Disease Prevention Device) for the next 2-6 weeks post-op. It is ok to remove them for hygiene and at bedtime.   Hand wash and Dry. **If the swelling persists in the legs after you stop wearing the Christopher hose, continue to wear them until the swelling decreases.**  REMOVE STOCKINGS AT LEAST DAILY FOR SKIN ASSESSMENT.   Do NOT let the stockings roll down, creating a tourniquet around the back of your knee. If you need to, leave the excess at the bottom of the stocking.   The best thing you can do to prevent blood clots is to walk around as much as possible, AT LEAST EVERY 1-2 HOURS.       CONSTIPATION PREVENTION:   Miralax or Senokot S/Gina-Colace and Stool softeners EVERY DAY while on pain meds.  Use other more aggressive over the counter LAXATIVES as needed for constipation (Examples: Milk of Magnesia, Dulcolax tabs or suppository, Magnesium Citrate, Fleet's Enema...etc.)   Drink lots of water.  Increase Fiber in diet.  Increase walking distance each day  DO NOT GO TOO LONG WITHOUT HAVING A BOWEL MOVEMENT!    ACTIVITY:   Weight bearing precautions as follows:  75% Partial weight bearing to operative leg with walker.   DO NOT TAKE YOURSELF OFF OF THE WALKER TOO SOON. ALLOW YOUR OUTPATIENT THERAPIST or SURGEON TO GUIDE YOU.   HIP PRECAUTIONS  DO NOT:   Twist  Lean past 90 degrees  Cross legs  After discharged from the facility, start Outpatient  Physical Therapy - Bring prescription to clinic of choice as soon as possible.   No heavy lifting, pulling, pushing or straining.  Pillow between legs when turning in bed at night.   Walk around at least every 1-2 hours while awake.   Have a variety of different positions throughout the day (sitting in a dependent position, legs elevated, walking, in a recliner or chair with legs straight).     WOUND CARE:   Dry dressing changes every other day and as needed for soiling for 14 days. Start WEDNESDAY.   Allow Steri-strips to fall off. Do NOT Tug on them.      DO NOT WET WOUND or apply any ointments, creams, lotions or antiseptics.  May wet incision AFTER 2 weeks- (9/5/22).     Ok to shower before then if able to keep wound from getting wet (plastic barrier, saran wrap or cling wrap and tape).   DO NOT TOUCH INCISION  Apply Ice to the hip and thigh as much as possible.    URINARY RETENTION:  If you start having difficulty urinating, decrease the use of Pain pills and muscle relaxers and notify your primary care doctor.     PNEUMONIA PREVENTION:  Stay out of bed as much as possible and walk around every 1-2 hours.  Continue breathing exercises (Incentive Spirometry) every 1-2 hours while mobility is limited and while you are on pain pills.    INFECTION PREVENTION:  Proper handwashing before and after dressing changes. Do not wet the wound. Wound care instructions as written above. NOTIFY MD OF EXCESSIVE WOUND DRAINAGE.  No alcohol, smoking or tobacco products  Pets should not be allowed around the wound or the dressing.   Treat UTI and skin infections as soon as possible.  Pre-medicate with antibiotics prior to dental or surgical procedures.   If you are diabetic, MAINTAIN GOOD BLOOD SUGAR CONTROL (Below 150)DURING YOUR RECOVERY. If you see high numbers, notify your primary care doctor.     Call your SURGEON'S OFFICE ([phone]) if you experience the following signs and symptoms of infection:   Unusual redness, swelling,  excessive, cloudy or foul smelling drainage at the incision site.   Persistent low grade temp OR a temp greater than 102 F, unrelieved by Tylenol  Pain at surgical site, unrelieved by pain meds    Warning signs of a blood clot in your leg: (CALL YOUR SURGEON)  New onset or increasing pain in calf, new onset tenderness or redness above or below the knee or increasing swelling of your calf, ankle, or foot.  Warning signs that a blood clot has traveled to your lungs: (REPORT TO THE ER/CALL 911)  Sudden or increase in Shortness of breath, sudden onset of chest pains, or  Localized chest pain with coughing.         IF ANY ISSUES ARISE AND YOU FEEL THE NEED TO CALL YOUR PRIMARY CARE DOCTOR, PLEASE LET YOUR SURGEON KNOW AS WELL.     For emergencies, please report to OUR ([facility] or [facility] main [place]) Emergency department and tell them to call YOUR SURGEON at [phone].     BEFORE MAKING ANY CHANGES TO THE MEDICAL CARE PLAN OR GOING TO THE EMERGENCY ROOM, PLEASE CONTACT THE SURGEON.    3rd floor nursing unit # [phone]  Use this number for questions about your discharge instructions or problems filling your discharge prescriptions.

## 2022-08-25 NOTE — DISCHARGE SUMMARY
[facility]  Discharge Note  Short Stay    Admit Date: 8/18/2022    Discharge Date and Time: 8/22/2022  2:08 PM     Attending Physician: No att. providers found     Discharge Provider: Jose Rafael Will    Diagnoses:  Active Hospital Problems    Diagnosis  POA    *Primary osteoarthritis of left hip [M16.12]  Yes      Resolved Hospital Problems   No resolved problems to display.       Discharged Condition: good    Hospital Course:   Patient presented for elective robotic-assisted left total hip arthroplasty The patient had no complications during the hospital stay and was discharged to skilled nursing facility in stable condition partial weightbearing with the assistance of a walker. Consults for physical therapy and rehab were given to the patient as well as a follow-up appointment in our office in 4 weeks for reevaluation. The patient was instructed on wound care and dressing changes as well as to continue RAMBO hose while at home. Prescriptions were given for continued DVT prophylaxis and pain control. Home medications were resumed please see discharge med rec for these.     Final Diagnoses: Same as principal problem.    Disposition: Home or Self Care    Follow up/Patient Instructions:    Medications:  Reconciled Home Medications:      Medication List      START taking these medications    methocarbamoL 750 MG Tab  Commonly known as: ROBAXIN  Take 1 tablet (750 mg total) by mouth every 6 (six) hours. for 14 days     polyethylene glycol 17 gram Pwpk  Commonly known as: GLYCOLAX  Take 17 g by mouth once daily. Constipation Prevention for 14 days        CHANGE how you take these medications    aspirin 81 MG EC tablet  Commonly known as: ECOTRIN  Take 1 tablet (81 mg total) by mouth 2 (two) times a day. Increase to twice a day for 6 weeks and then restart daily dose on 10/4/22  What changed:   · when to take this  · additional instructions     HYDROcodone-acetaminophen 7.5-325 mg per tablet  Commonly known as:  NORCO  Take 1 tablet by mouth every 4 (four) hours as needed for Pain.  What changed:   · how to take this  · when to take this  · reasons to take this        CONTINUE taking these medications    amitriptyline 50 MG tablet  Commonly known as: ELAVIL  Take 50 mg by mouth nightly.     amLODIPine 5 MG tablet  Commonly known as: NORVASC  Take 10 mg by mouth Daily.     ammonium lactate 12 % Crea  Apply topically 2 (two) times a day. Apply to feet     DESCOVY 200-25 mg Tab  Generic drug: emtricitabine-tenofovir alafen  Take 1 tablet by mouth once daily.     ergocalciferol 50,000 unit Cap  Commonly known as: ERGOCALCIFEROL  Take 1 capsule (50,000 Units total) by mouth every 7 days.     multivitamin per tablet  Commonly known as: THERAGRAN  Take 1 tablet by mouth once daily.     omeprazole 20 MG capsule  Commonly known as: PRILOSEC  Take 20 mg by mouth once daily.     tadalafiL 5 MG tablet  Commonly known as: CIALIS  Take 1 tablet (5 mg total) by mouth daily as needed for Erectile Dysfunction (PRN sexual activity).     TIVICAY 50 mg Tab  Generic drug: dolutegravir  Take 1 tablet (50 mg total) by mouth once daily.          No discharge procedures on file.   Follow-up Information     Zachary Bass MD. Go on 9/16/2022.    Specialty: Orthopedic Surgery  Why: Ortho follow up appt on Friday 9/16/22 @ 9:00am catherine/ Jose Rafael (Dr Bass's Phy Assistant)  Contact information:  [address]  [address] [place] [id number]  [phone]                         Discharge Procedure Orders (must include Diet, Follow-up, Activity):  No discharge procedures on file.

## 2022-08-27 LAB — VIEW PATHOLOGY REPORT (RELIAPATH): NORMAL

## 2022-08-31 ENCOUNTER — OFFICE VISIT (OUTPATIENT)
Dept: ORTHOPEDICS | Facility: CLINIC | Age: 67
End: 2022-08-31
Payer: MEDICARE

## 2022-08-31 VITALS
SYSTOLIC BLOOD PRESSURE: 111 MMHG | WEIGHT: 200 LBS | HEART RATE: 94 BPM | BODY MASS INDEX: 31.39 KG/M2 | DIASTOLIC BLOOD PRESSURE: 66 MMHG | HEIGHT: 67 IN

## 2022-08-31 DIAGNOSIS — Z96.642 STATUS POST LEFT HIP REPLACEMENT: ICD-10-CM

## 2022-08-31 DIAGNOSIS — M16.12 PRIMARY LOCALIZED OSTEOARTHRITIS OF LEFT HIP: Primary | ICD-10-CM

## 2022-08-31 PROCEDURE — 99024 POSTOP FOLLOW-UP VISIT: CPT | Mod: POP,,, | Performed by: PHYSICIAN ASSISTANT

## 2022-08-31 PROCEDURE — 99024 PR POST-OP FOLLOW-UP VISIT: ICD-10-PCS | Mod: POP,,, | Performed by: PHYSICIAN ASSISTANT

## 2022-08-31 RX ORDER — HYDROCODONE BITARTRATE AND ACETAMINOPHEN 7.5; 325 MG/1; MG/1
1 TABLET ORAL
COMMUNITY
Start: 2022-08-22 | End: 2022-11-23

## 2022-08-31 RX ORDER — AMITRIPTYLINE HYDROCHLORIDE 50 MG/1
1 TABLET, FILM COATED ORAL NIGHTLY
COMMUNITY
End: 2022-11-23 | Stop reason: SDUPTHER

## 2022-08-31 RX ORDER — CYCLOBENZAPRINE HCL 10 MG
TABLET ORAL
COMMUNITY
Start: 2022-08-22 | End: 2022-11-23

## 2022-08-31 RX ORDER — DOXYCYCLINE 100 MG/1
100 CAPSULE ORAL EVERY 12 HOURS
Qty: 14 CAPSULE | Refills: 0 | Status: SHIPPED | OUTPATIENT
Start: 2022-08-31 | End: 2022-09-07

## 2022-08-31 RX ORDER — ACETAMINOPHEN 325 MG/1
TABLET ORAL
COMMUNITY
Start: 2022-08-23 | End: 2023-03-23

## 2022-08-31 NOTE — PROGRESS NOTES
Chief Complaint:   Chief Complaint   Patient presents with    Post-op Evaluation     LT GINA on 08/18/22-Pt states he is doing well, going to the PT once a day, but presents a minor swelling in his LT thigh and minor pain which he describes it as a sharp/stabbing pain. Pain scale 6/10.       History of present illness:    66 year old male presents office today for evaluation his left hip.  He is 2 weeks status post left total hip arthroplasty.  He is currently staying in a skilled nursing facility following his discharge from the hospital and he was noted to have some swelling over the proximal aspect of the left hip.  The on-call physician was notified this past weekend and he was recommended to follow up with our office for further evaluation.  Currently he has no pain.  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    History of syphilis 05/12/2022    Human immunodeficiency virus (HIV) disease     Hypertension        Past Surgical History:   Procedure Laterality Date    ESOPHAGOGASTRODUODENOSCOPY N/A 6/20/2022    Procedure: EGD (ESOPHAGOGASTRODUODENOSCOPY);  Surgeon: Emile Tang MD;  Location: [facility];  Service: Gastroenterology;  Laterality: N/A;    HIP REPLACEMENT ARTHROPLASTY Right 02/02/2021    ROBOTIC ARTHROPLASTY, HIP Left 8/18/2022    Procedure: ROBOTIC ARTHROPLASTY,HIP;  Surgeon: Bairon Bass MD;  Location: [facility];  Service: Orthopedics;  Laterality: Left;       Current Outpatient Medications   Medication Sig    acetaminophen (TYLENOL) 325 MG tablet     amitriptyline (ELAVIL) 50 MG tablet Take 50 mg by mouth nightly.    amitriptyline (ELAVIL) 50 MG tablet Take 1 tablet by mouth every evening.    amLODIPine (NORVASC) 5 MG tablet Take 10 mg by mouth Daily.    ammonium lactate 12 % Crea Apply topically 2 (two) times a day. Apply to feet    aspirin  "(ECOTRIN) 81 MG EC tablet Take 1 tablet (81 mg total) by mouth 2 (two) times a day. Increase to twice a day for 6 weeks and then restart daily dose on 10/4/22    cyclobenzaprine (FLEXERIL) 10 MG tablet     DESCOVY 200-25 mg Tab Take 1 tablet by mouth once daily.    ergocalciferol (ERGOCALCIFEROL) 50,000 unit Cap Take 1 capsule (50,000 Units total) by mouth every 7 days.    HYDROcodone-acetaminophen (NORCO) 7.5-325 mg per tablet 1 tablet.    methocarbamoL (ROBAXIN) 750 MG Tab Take 1 tablet (750 mg total) by mouth every 6 (six) hours. for 14 days    multivitamin (THERAGRAN) per tablet Take 1 tablet by mouth once daily.    omeprazole (PRILOSEC) 20 MG capsule Take 20 mg by mouth once daily.    polyethylene glycol (GLYCOLAX) 17 gram PwPk Take 17 g by mouth once daily. Constipation Prevention for 14 days    tadalafiL (CIALIS) 5 MG tablet Take 1 tablet (5 mg total) by mouth daily as needed for Erectile Dysfunction (PRN sexual activity).    TIVICAY 50 mg Tab Take 1 tablet (50 mg total) by mouth once daily.    doxycycline (VIBRAMYCIN) 100 MG Cap Take 1 capsule (100 mg total) by mouth every 12 (twelve) hours. for 7 days    multivitamin,tx-iron-minerals Tab 1 tablet.     No current facility-administered medications for this visit.       Review of patient's allergies indicates:  No Known Allergies    Family History   Problem Relation Age of Onset    Diabetes Mother     Diabetes Brother        Social History     Socioeconomic History    Marital status:    Tobacco Use    Smoking status: Every Day     Packs/day: 0.25     Years: 46.00     Pack years: 11.50     Types: Cigarettes     Start date: 1/1/1976    Smokeless tobacco: Current    Tobacco comments:     less than 1/2 daily   Substance and Sexual Activity    Alcohol use: Yes     Alcohol/week: 4.0 standard drinks     Types: 4 Cans of beer per week     Comment: Drinks vodka    Drug use: Yes     Types: "Crack" cocaine         Review of Systems:    Constitution:   Denies " "chills, fever, and sweats.  HENT:   Denies headaches or blurry vision.  Cardiovascular:  Denies chest pain or irregular heart beat.  Respiratory:   Denies cough or shortness of breath.  Gastrointestinal:  Denies abdominal pain, nausea, or vomiting.  Musculoskeletal:   Denies muscle cramps.  Neurological:   Denies dizziness or focal weakness.  Psychiatric/Behavior: Normal mental status.  Hematology/Lymph:  Denies bleeding problem or easy bruising/bleeding.  Skin:    Denies rash or suspicious lesions.    Examination:    Vital Signs:    Vitals:    08/31/22 1249 08/31/22 1253   BP: 111/66    Pulse: 94    Weight: 90.7 kg (200 lb)    Height: 5' 7" (1.702 m)    PainSc:    6       Body mass index is 31.32 kg/m².    Constitution:   Well-developed, well nourished patient in no acute distress.  Neurological:   Alert and oriented x 3 and cooperative to examination.     Psychiatric/Behavior: Normal mental status.  Respiratory:   No shortness of breath.  Eyes:    Extraoccular muscles intact  Skin:    No scars, rash or suspicious lesions.    Physical Exam:     Left Hip    No swelling, redness or increased heat.    Wound healing normal. Surgical incision C/D/I   Hematoma noted over the proximal incision.  There is no drainage or erythema    Motion was normal.     Weakness of the  hip was observed.    Sensation intact distally    Intact pedal pulses    X-Ray Hip:   Complete left hip x-ray with two or more views of the AP and lateral aspects were reviewed  Impressions Radiology Test   X-ray of hip was performed showed intact  hip prosthesis          Assessment:     Primary localized osteoarthritis of left hip    Status post left hip replacement    Other orders  -     doxycycline (VIBRAMYCIN) 100 MG Cap; Take 1 capsule (100 mg total) by mouth every 12 (twelve) hours. for 7 days  Dispense: 14 capsule; Refill: 0        Plan:      Discussed with the patient today that he has a postoperative hematoma.  He will be placed on oral " doxycycline 100 mg twice daily for 1 week.  He will follow-up with us in 2 weeks for repeat evaluation        Follow up in about 2 weeks (around 9/14/2022).    DISCLAIMER: This note may have been dictated using voice recognition software and may contain grammatical errors.

## 2022-10-10 ENCOUNTER — OFFICE VISIT (OUTPATIENT)
Dept: ORTHOPEDICS | Facility: CLINIC | Age: 67
End: 2022-10-10
Payer: MEDICARE

## 2022-10-10 VITALS
WEIGHT: 200 LBS | BODY MASS INDEX: 31.39 KG/M2 | HEART RATE: 97 BPM | DIASTOLIC BLOOD PRESSURE: 79 MMHG | SYSTOLIC BLOOD PRESSURE: 149 MMHG | HEIGHT: 67 IN

## 2022-10-10 DIAGNOSIS — Z96.642 STATUS POST LEFT HIP REPLACEMENT: Primary | ICD-10-CM

## 2022-10-10 PROCEDURE — 99024 POSTOP FOLLOW-UP VISIT: CPT | Mod: POP,,, | Performed by: SPECIALIST

## 2022-10-10 PROCEDURE — 99024 PR POST-OP FOLLOW-UP VISIT: ICD-10-PCS | Mod: POP,,, | Performed by: SPECIALIST

## 2022-10-10 NOTE — PROGRESS NOTES
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    History of syphilis 05/12/2022    Human immunodeficiency virus (HIV) disease     Hypertension        Past Surgical History:   Procedure Laterality Date    ESOPHAGOGASTRODUODENOSCOPY N/A 06/20/2022    Procedure: EGD (ESOPHAGOGASTRODUODENOSCOPY);  Surgeon: Emile Tang MD;  Location: [facility];  Service: Gastroenterology;  Laterality: N/A;    HIP REPLACEMENT ARTHROPLASTY Right 02/02/2021    HIP SURGERY Left 08/18/2022    ROBOTIC ARTHROPLASTY, HIP Left 08/18/2022    Procedure: ROBOTIC ARTHROPLASTY,HIP;  Surgeon: Bairon Bass MD;  Location: [facility] OR;  Service: Orthopedics;  Laterality: Left;       Current Outpatient Medications   Medication Sig    acetaminophen (TYLENOL) 325 MG tablet     amitriptyline (ELAVIL) 50 MG tablet Take 50 mg by mouth nightly.    amitriptyline (ELAVIL) 50 MG tablet Take 1 tablet by mouth every evening.    amLODIPine (NORVASC) 5 MG tablet Take 10 mg by mouth Daily.    ammonium lactate 12 % Crea Apply topically 2 (two) times a day. Apply to feet    aspirin (ECOTRIN) 81 MG EC tablet Take 1 tablet (81 mg total) by mouth 2 (two) times a day. Increase to twice a day for 6 weeks and then restart daily dose on 10/4/22    cyclobenzaprine (FLEXERIL) 10 MG tablet     ergocalciferol (ERGOCALCIFEROL) 50,000 unit Cap Take 1 capsule (50,000 Units total) by mouth every 7 days.    HYDROcodone-acetaminophen (NORCO) 7.5-325 mg per tablet 1 tablet.    multivitamin (THERAGRAN) per tablet Take 1 tablet by mouth once daily.    multivitamin,tx-iron-minerals Tab 1 tablet.    omeprazole (PRILOSEC) 20 MG capsule Take 20 mg by mouth once daily.    tadalafiL (CIALIS) 5 MG tablet Take 1 tablet (5 mg total) by mouth daily as needed for Erectile Dysfunction (PRN sexual activity).     No current facility-administered medications for this visit.       Review  "of patient's allergies indicates:  No Known Allergies    Family History   Problem Relation Age of Onset    Diabetes Mother     Diabetes Brother        Social History     Socioeconomic History    Marital status:    Tobacco Use    Smoking status: Every Day     Packs/day: 0.25     Years: 46.00     Pack years: 11.50     Types: Cigarettes     Start date: 1/1/1976    Smokeless tobacco: Current    Tobacco comments:     less than 1/2 daily   Substance and Sexual Activity    Alcohol use: Yes     Alcohol/week: 4.0 standard drinks     Types: 4 Cans of beer per week     Comment: Drinks vodka    Drug use: Yes     Types: "Crack" cocaine       Chief Complaint:   Chief Complaint   Patient presents with    Left Hip - Follow-up     Follow-up for left total hip sx on 8/18/22. Hip is doing alright. Still in pain. Using crutches. Has a PT go to his house once a week.        Consulting Physician: No ref. provider found    History of present illness:    This is a 67 y.o. year old male who complains of mild left hip pain.  He looks good.  His hematoma has completely resolved.    Review of Systems:    Constitution:   Denies chills, fever, and sweats.  HENT:   Denies headaches or blurry vision.  Cardiovascular:  Denies chest pain or irregular heart beat.  Respiratory:   Denies cough or shortness of breath.  Gastrointestinal:  Denies abdominal pain, nausea, or vomiting.  Musculoskeletal:   Denies muscle cramps.  Neurological:   Denies dizziness or focal weakness.  Psychiatric/Behavior: Normal mental status.  Hematology/Lymph:  Denies bleeding problem or easy bruising/bleeding.  Skin:    Denies rash or suspicious lesions.    Examination:    Vital Signs:    Vitals:    10/10/22 1033   BP: (!) 149/79   Pulse: 97   Weight: 90.7 kg (200 lb)   Height: 5' 7" (1.702 m)   PainSc:   5       Body mass index is 31.32 kg/m².    Constitution:   Well-developed, well nourished patient in no acute distress.  Neurological:   Alert and oriented x 3 and " cooperative to examination.     Psychiatric/Behavior: Normal mental status.  Respiratory:   No shortness of breath.  Eyes:    Extraoccular muscles intact  Skin:    No scars, rash or suspicious lesions.    Physical Exam:  Left hip exam:   Well-healed incision   No swelling, no redness, no increased heat   No palpable tenderness  No weight-bearing tenderness  Normal range of motion   Mild quad atrophy  2+ pulses with intact sensory and motor function         Assessment: Status post left hip replacement        Plan:  Follow-up in 3 months for checkup  Advanced his physical therapy to full weight-bearing and advance his strengthening   Advance to a cane and eventually no assistance      DISCLAIMER: This note may have been dictated using voice recognition software and may contain grammatical errors.     NOTE: Consult report sent to referring provider via [employer] EMR.

## 2022-10-27 DIAGNOSIS — N18.9 CHRONIC KIDNEY DISEASE, UNSPECIFIED CKD STAGE: Primary | ICD-10-CM

## 2022-11-21 RX ORDER — POLYETHYLENE GLYCOL 3350 17 G/17G
POWDER, FOR SOLUTION ORAL
COMMUNITY
Start: 2022-08-24 | End: 2022-11-23

## 2022-11-21 RX ORDER — LEVOTHYROXINE SODIUM 75 UG/1
1 TABLET ORAL DAILY
COMMUNITY
End: 2023-01-11 | Stop reason: SDUPTHER

## 2022-11-21 RX ORDER — DOLUTEGRAVIR SODIUM 50 MG/1
1 TABLET, FILM COATED ORAL DAILY
COMMUNITY
End: 2023-01-11 | Stop reason: SDUPTHER

## 2022-11-23 ENCOUNTER — OFFICE VISIT (OUTPATIENT)
Dept: NEPHROLOGY | Facility: CLINIC | Age: 67
End: 2022-11-23
Payer: MEDICARE

## 2022-11-23 VITALS
TEMPERATURE: 98 F | SYSTOLIC BLOOD PRESSURE: 160 MMHG | HEIGHT: 67 IN | DIASTOLIC BLOOD PRESSURE: 90 MMHG | HEART RATE: 99 BPM | BODY MASS INDEX: 29.41 KG/M2 | RESPIRATION RATE: 20 BRPM | WEIGHT: 187.38 LBS | OXYGEN SATURATION: 99 %

## 2022-11-23 DIAGNOSIS — Z72.0 TOBACCO USER: ICD-10-CM

## 2022-11-23 DIAGNOSIS — I10 PRIMARY HYPERTENSION: ICD-10-CM

## 2022-11-23 DIAGNOSIS — N18.31 STAGE 3A CHRONIC KIDNEY DISEASE: Primary | ICD-10-CM

## 2022-11-23 PROBLEM — R74.8 ELEVATED LIVER ENZYMES: Status: ACTIVE | Noted: 2022-11-23

## 2022-11-23 PROBLEM — M79.672 PAIN IN LEFT FOOT: Status: ACTIVE | Noted: 2022-11-23

## 2022-11-23 PROBLEM — G47.00 INSOMNIA: Status: ACTIVE | Noted: 2022-11-23

## 2022-11-23 PROBLEM — G62.9 NEUROPATHY: Status: ACTIVE | Noted: 2022-11-23

## 2022-11-23 PROBLEM — F14.11 HISTORY OF COCAINE ABUSE: Status: ACTIVE | Noted: 2022-11-23

## 2022-11-23 PROBLEM — L84 FOOT CALLUS: Status: ACTIVE | Noted: 2022-11-23

## 2022-11-23 PROBLEM — E03.9 HYPOTHYROIDISM: Status: ACTIVE | Noted: 2022-11-23

## 2022-11-23 PROBLEM — B18.2 CHRONIC HEPATITIS C: Status: ACTIVE | Noted: 2022-11-23

## 2022-11-23 PROBLEM — R52 PAIN: Status: ACTIVE | Noted: 2017-07-10

## 2022-11-23 PROBLEM — Z74.09 IMPAIRED MOBILITY: Status: ACTIVE | Noted: 2022-11-23

## 2022-11-23 PROBLEM — M87.059 AVASCULAR NECROSIS OF FEMORAL HEAD: Status: ACTIVE | Noted: 2022-11-23

## 2022-11-23 PROBLEM — S09.90XA CLOSED HEAD INJURY: Status: ACTIVE | Noted: 2022-11-23

## 2022-11-23 PROBLEM — M87.059 AVASCULAR NECROSIS OF BONE OF HIP: Status: ACTIVE | Noted: 2022-11-23

## 2022-11-23 PROBLEM — D64.9 ANEMIA: Status: ACTIVE | Noted: 2022-11-23

## 2022-11-23 PROBLEM — B45.1 CRYPTOCOCCAL MENINGITIS: Status: ACTIVE | Noted: 2022-11-23

## 2022-11-23 PROBLEM — Z96.649 HISTORY OF TOTAL HIP REPLACEMENT: Status: ACTIVE | Noted: 2022-11-23

## 2022-11-23 PROBLEM — E66.9 OBESITY: Status: ACTIVE | Noted: 2022-11-23

## 2022-11-23 PROBLEM — L91.9 HYPERTROPHIC CONDITION OF SKIN: Status: ACTIVE | Noted: 2022-11-23

## 2022-11-23 PROBLEM — T81.9XXA POSTOPERATIVE COMPLICATION: Status: ACTIVE | Noted: 2022-11-23

## 2022-11-23 PROCEDURE — 99214 PR OFFICE/OUTPT VISIT, EST, LEVL IV, 30-39 MIN: ICD-10-PCS | Mod: S$PBB,,, | Performed by: NURSE PRACTITIONER

## 2022-11-23 PROCEDURE — 99214 OFFICE O/P EST MOD 30 MIN: CPT | Mod: S$PBB,,, | Performed by: NURSE PRACTITIONER

## 2022-11-23 PROCEDURE — 99215 OFFICE O/P EST HI 40 MIN: CPT | Mod: PBBFAC | Performed by: NURSE PRACTITIONER

## 2022-11-23 RX ORDER — LOSARTAN POTASSIUM 50 MG/1
50 TABLET ORAL DAILY
Qty: 90 TABLET | Refills: 3 | Status: SHIPPED | OUTPATIENT
Start: 2022-11-23 | End: 2023-01-11 | Stop reason: SDUPTHER

## 2022-11-23 RX ORDER — OXYCODONE AND ACETAMINOPHEN 10; 325 MG/1; MG/1
1 TABLET ORAL
COMMUNITY
Start: 2022-10-24 | End: 2023-02-22

## 2022-11-23 NOTE — PROGRESS NOTES
"[facility]  Nephrology Clinic Note    Chief complaint: Chronic Kidney Disease (Follow up)    History of present illness:   Renetta Glover Jr. is a 67 y.o. AA male with past medical history of chronic kidney disease, HIV, treated hepatitis C, syphilis, arthritis, nephrolithiasis, past history of polysubstance abuse, and hypertension. Presents for follow-up appointment in nephrology clinic. Denies complaints.    Review of Systems  12 point review of systems conducted, negative except as stated in the history of present illness.    Allergies: Patient has No Known Allergies.     Past Medical History:  has a past medical history of Disorder of kidney and ureter, GERD (gastroesophageal reflux disease), History of cryptococcal meningitis, History of syphilis, Human immunodeficiency virus (HIV) disease, and Hypertension.    Procedure History:  has a past surgical history that includes Hip replacement arthroplasty (Right, 02/02/2021); Esophagogastroduodenoscopy (N/A, 06/20/2022); robotic arthroplasty, hip (Left, 08/18/2022); and Hip surgery (Left, 08/18/2022).    Family History: family history includes Diabetes in his brother and mother.    Social History:  reports that he has been smoking cigarettes. He started smoking about 46 years ago. He has a 11.50 pack-year smoking history. He uses smokeless tobacco. He reports that he does not currently use alcohol after a past usage of about 4.0 standard drinks per week. He reports current drug use. Drug: "Crack" cocaine.    Physical exam  BP (!) 160/90 (BP Location: Right arm, Patient Position: Sitting, BP Method: Medium (Manual))   Pulse 99   Temp 98.2 °F (36.8 °C) (Oral)   Resp 20   Ht 5' 7" (1.702 m)   Wt 85 kg (187 lb 6.4 oz)   SpO2 99%   BMI 29.35 kg/m²   General appearance: Patient is in no acute distress.  Skin: No rashes or wounds.  HEENT: PERRLA, EOMI, no scleral icterus, no JVD. Neck is supple.  Chest: Respirations are unlabored. Lungs " sounds are clear.   Heart: S1, S2.   Abdomen: Benign.  : Deferred.  Extremities: No edema, peripheral pulses are palpable.   Neuro: No focal deficits.     Home Medications:    Current Outpatient Medications:     acetaminophen (TYLENOL) 325 MG tablet, , Disp: , Rfl:     amitriptyline (ELAVIL) 50 MG tablet, Take 50 mg by mouth nightly., Disp: , Rfl:     amLODIPine (NORVASC) 5 MG tablet, Take 10 mg by mouth Daily., Disp: , Rfl:     ammonium lactate 12 % Crea, Apply topically 2 (two) times a day. Apply to feet, Disp: , Rfl:     aspirin (ECOTRIN) 81 MG EC tablet, Take 1 tablet (81 mg total) by mouth 2 (two) times a day. Increase to twice a day for 6 weeks and then restart daily dose on 10/4/22, Disp: 84 tablet, Rfl: 0    dolutegravir (TIVICAY) 50 mg Tab, Take 1 tablet by mouth once daily., Disp: , Rfl:     ergocalciferol (ERGOCALCIFEROL) 50,000 unit Cap, Take 1 capsule (50,000 Units total) by mouth every 7 days., Disp: 5 capsule, Rfl: 3    levothyroxine (SYNTHROID) 75 MCG tablet, Take 1 tablet by mouth once daily., Disp: , Rfl:     omeprazole (PRILOSEC) 20 MG capsule, Take 20 mg by mouth once daily., Disp: , Rfl:     oxyCODONE-acetaminophen (PERCOCET)  mg per tablet, 1 tablet as needed., Disp: , Rfl:     tadalafiL (CIALIS) 5 MG tablet, Take 1 tablet (5 mg total) by mouth daily as needed for Erectile Dysfunction (PRN sexual activity)., Disp: 10 tablet, Rfl: 2    losartan (COZAAR) 50 MG tablet, Take 1 tablet (50 mg total) by mouth once daily., Disp: 90 tablet, Rfl: 3    Laboratory data  Electrolytes   Lab Results   Component Value Date     08/20/2022    K 4.0 08/20/2022    CHLORIDE 106 08/20/2022    CALCIUM 9.0 08/20/2022    PHOS 2.7 08/04/2017    MG 1.9 08/04/2017    CO2 26 08/20/2022      Renal function    Lab Results   Component Value Date    BUN 12.3 08/20/2022    CREATININE 1.56 (H) 08/20/2022    EGFRNORACEVR 49 08/20/2022      LFTs  Lab Results   Component Value Date    ALKPHOS 111 08/15/2022    AST 22  08/15/2022    ALT 26 08/15/2022    BILIDIR 0.1 01/20/2022    IBILI 0.10 01/20/2022    BILITOT 0.2 08/15/2022    ALBUMIN 4.2 08/15/2022      DM  Lab Results   Component Value Date    HGBA1C 5.6 01/15/2021    GLUCOSE 103 08/20/2022      MBD  Lab Results   Component Value Date    RBZENHHC06ZR 32.8 05/12/2022      Anemia  Lab Results   Component Value Date    WBC 8.8 08/20/2022    HGB 11.5 (L) 08/20/2022    HCT 34.2 (L) 08/20/2022     08/20/2022    IRON 75 03/28/2019    TIBC 328 03/28/2019    TRANS 263.0 03/28/2019    FERRITIN 539.6 (H) 07/13/2017    FOLATE 17.9 (H) 07/12/2018    FPMKJZVX56 261 07/12/2018       Other  Lab Results   Component Value Date    HIV Reactive (A) 09/05/2019    HEPCAB Reactive (A) 08/05/2021    HEPBSURFAG Nonreactive 05/12/2022    HEPBSAB Reactive (A) 08/15/2022      Urine studies:  Lab Results   Component Value Date    COLORUA Light-Yellow (A) 08/15/2022    APPEARANCEUA Clear 08/15/2022    SGUA 1.017 08/15/2022    PHUA 6.0 08/15/2022    PROTEINUA Trace (A) 08/15/2022    GLUCOSEUA Normal 08/15/2022    KETONESUA Negative 08/15/2022    BLOODUA Negative 08/15/2022    NITRITESUA Negative 08/15/2022    LEUKOCYTESUR 25 (A) 08/15/2022    RBCUA 0-5 08/15/2022    WBCUA 6-10 (A) 08/15/2022    BACTERIA None Seen 08/15/2022    SQEPUA Trace (A) 08/15/2022    HYALINECASTS None Seen 08/15/2022       Imaging  CT of abdomen and pelvis with and without contrast 07/14/2017:  ADRENALS: No adrenal nodules.  KIDNEYS/URETERS: Evaluation for radiodense stones limited by contrast  in the bilateral collecting systems. No hydronephrosis or solid renal  masses.     Impression and plan     Stage 3a chronic kidney disease  -     Ambulatory referral/consult to Nephrology  -     CBC Auto Differential; Future; Expected date: 02/23/2023  -     Comprehensive Metabolic Panel; Future; Expected date: 02/23/2023  -     Protein/Creatinine Ratio, Urine; Future; Expected date: 02/23/2023  -     Urinalysis, Reflex to Urine Culture  Urine, Clean Catch; Future; Expected date: 02/23/2023  Likely hypertensive kidney disease.  Renal indices are stable.  Continue risk factor management and periodic monitoring.    Continue:  -follow 2 g a day dietary sodium restriction  -control high blood pressure (goal blood pressure is less than 130/80, please check blood pressure twice a week and bring blood pressure logs to office visit)  -exercise at least 30 minutes a day, 5 days a week  -maintain healthy weight  -decrease or stop alcohol use  -do not smoke  -stay well hydrated (drink water only, avoid juices, sweet tea, and sodas)  -ask about staying up-to-date on vaccinations (flu vaccine, pneumonia vaccine, hepatitis B vaccine)  -avoid excessive use of NSAIDs (ibuprofen, naproxen, Aleve, Advil, Toradol, Mobic), take Tylenol as needed for headache or mild pain  -take cholesterol lowering medications if prescribed (LDL goal less than 100)    Follow-up with the primary care provider as scheduled.  Return to subspecialty nephrology (kidney) clinic with routine labs in 4 months    Primary hypertension  -     losartan (COZAAR) 50 MG tablet; Take 1 tablet (50 mg total) by mouth once daily.  Dispense: 90 tablet; Refill: 3  Blood pressure is above goal, will add losartan to medication regimen.      Tobacco user  Patient was counseled on importance of tobacco use cessation.  Strongly encouraged to quit, offered a referral to a smoking cessation program.    BMI 29.0-29.9,adult  Lifestyle and dietary interventions discussed, patient counseled on weight loss using portion control, non- sedentary lifestyle, low-carbohydrate/low fat diet.       11/23/2022  Alison Lemon NP  [facility] Nephrology

## 2022-12-02 ENCOUNTER — TELEPHONE (OUTPATIENT)
Dept: OPHTHALMOLOGY | Facility: CLINIC | Age: 67
End: 2022-12-02
Payer: MEDICARE

## 2022-12-02 NOTE — TELEPHONE ENCOUNTER
"----- Message from Meera White sent at 12/2/2022 10:25 AM CST -----  Regarding: Please see if you can see referral  Contact: [phone]  Mr. Gu sister, " Malena' called to schedule her brothers appointment because he received a letter to call and make appt. But, it may have been deferred and we cannot see what he needs to come in for. Please look in chart or.. and call his sister to schedule.    TYVM    "

## 2023-01-06 PROBLEM — B18.2 CHRONIC HEPATITIS C: Status: RESOLVED | Noted: 2022-11-23 | Resolved: 2023-01-06
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
## 2023-01-11 ENCOUNTER — TELEPHONE (OUTPATIENT)
Dept: INFECTIOUS DISEASES | Facility: CLINIC | Age: 68
End: 2023-01-11

## 2023-01-11 ENCOUNTER — OFFICE VISIT (OUTPATIENT)
Dept: INFECTIOUS DISEASES | Facility: CLINIC | Age: 68
End: 2023-01-11
Payer: MEDICARE

## 2023-01-11 VITALS
SYSTOLIC BLOOD PRESSURE: 119 MMHG | TEMPERATURE: 99 F | BODY MASS INDEX: 28.39 KG/M2 | RESPIRATION RATE: 18 BRPM | WEIGHT: 187.31 LBS | HEIGHT: 68 IN | OXYGEN SATURATION: 100 % | DIASTOLIC BLOOD PRESSURE: 62 MMHG | HEART RATE: 104 BPM

## 2023-01-11 DIAGNOSIS — Z86.19 HEPATITIS C VIRUS INFECTION CURED AFTER ANTIVIRAL DRUG THERAPY: ICD-10-CM

## 2023-01-11 DIAGNOSIS — N18.31 STAGE 3A CHRONIC KIDNEY DISEASE: ICD-10-CM

## 2023-01-11 DIAGNOSIS — Z72.0 TOBACCO USER: ICD-10-CM

## 2023-01-11 DIAGNOSIS — F14.90 COCAINE USE: ICD-10-CM

## 2023-01-11 DIAGNOSIS — E03.9 HYPOTHYROIDISM, UNSPECIFIED TYPE: ICD-10-CM

## 2023-01-11 DIAGNOSIS — B20 HIV DISEASE: Primary | ICD-10-CM

## 2023-01-11 DIAGNOSIS — N52.9 ERECTILE DYSFUNCTION, UNSPECIFIED ERECTILE DYSFUNCTION TYPE: ICD-10-CM

## 2023-01-11 DIAGNOSIS — B45.1 CRYPTOCOCCAL MENINGITIS: ICD-10-CM

## 2023-01-11 DIAGNOSIS — I10 ESSENTIAL HYPERTENSION: ICD-10-CM

## 2023-01-11 DIAGNOSIS — E55.9 VITAMIN D DEFICIENCY: ICD-10-CM

## 2023-01-11 DIAGNOSIS — K21.9 GASTROESOPHAGEAL REFLUX DISEASE, UNSPECIFIED WHETHER ESOPHAGITIS PRESENT: ICD-10-CM

## 2023-01-11 DIAGNOSIS — Z86.19 HISTORY OF SYPHILIS: ICD-10-CM

## 2023-01-11 DIAGNOSIS — Z23 NEED FOR VACCINATION: ICD-10-CM

## 2023-01-11 DIAGNOSIS — Z11.3 ROUTINE SCREENING FOR STI (SEXUALLY TRANSMITTED INFECTION): ICD-10-CM

## 2023-01-11 LAB
ALBUMIN SERPL-MCNC: 4.1 G/DL (ref 3.4–4.8)
ALBUMIN/GLOB SERPL: 1 RATIO (ref 1.1–2)
ALP SERPL-CCNC: 106 UNIT/L (ref 40–150)
ALT SERPL-CCNC: 15 UNIT/L (ref 0–55)
AST SERPL-CCNC: 20 UNIT/L (ref 5–34)
BASOPHILS # BLD AUTO: 0.09 X10(3)/MCL (ref 0–0.2)
BASOPHILS NFR BLD AUTO: 1.8 %
BILIRUBIN DIRECT+TOT PNL SERPL-MCNC: 0.2 MG/DL
BUN SERPL-MCNC: 16.9 MG/DL (ref 8.4–25.7)
CALCIUM SERPL-MCNC: 10.1 MG/DL (ref 8.8–10)
CHLORIDE SERPL-SCNC: 106 MMOL/L (ref 98–107)
CO2 SERPL-SCNC: 28 MMOL/L (ref 23–31)
CREAT SERPL-MCNC: 1.75 MG/DL (ref 0.73–1.18)
DEPRECATED CALCIDIOL+CALCIFEROL SERPL-MC: 24.7 NG/ML (ref 30–80)
EOSINOPHIL # BLD AUTO: 0.3 X10(3)/MCL (ref 0–0.9)
EOSINOPHIL NFR BLD AUTO: 5.9 %
ERYTHROCYTE [DISTWIDTH] IN BLOOD BY AUTOMATED COUNT: 14.3 % (ref 11.5–17)
GFR SERPLBLD CREATININE-BSD FMLA CKD-EPI: 42 MLS/MIN/1.73/M2
GLOBULIN SER-MCNC: 4.2 GM/DL (ref 2.4–3.5)
GLUCOSE SERPL-MCNC: 87 MG/DL (ref 82–115)
HBV SURFACE AG SERPL QL IA: NONREACTIVE
HCT VFR BLD AUTO: 43.4 % (ref 42–52)
HGB BLD-MCNC: 14 GM/DL (ref 14–18)
IMM GRANULOCYTES # BLD AUTO: 0.03 X10(3)/MCL (ref 0–0.04)
IMM GRANULOCYTES NFR BLD AUTO: 0.6 %
LYMPHOCYTES # BLD AUTO: 1.61 X10(3)/MCL (ref 0.6–4.6)
LYMPHOCYTES NFR BLD AUTO: 31.6 %
MCH RBC QN AUTO: 30.7 PG
MCHC RBC AUTO-ENTMCNC: 32.3 MG/DL (ref 33–36)
MCV RBC AUTO: 95.2 FL (ref 80–94)
MONOCYTES # BLD AUTO: 0.65 X10(3)/MCL (ref 0.1–1.3)
MONOCYTES NFR BLD AUTO: 12.7 %
NEUTROPHILS # BLD AUTO: 2.42 X10(3)/MCL (ref 2.1–9.2)
NEUTROPHILS NFR BLD AUTO: 47.4 %
NRBC BLD AUTO-RTO: 0 %
PLATELET # BLD AUTO: 302 X10(3)/MCL (ref 130–400)
PMV BLD AUTO: 11.2 FL (ref 7.4–10.4)
POTASSIUM SERPL-SCNC: 4.1 MMOL/L (ref 3.5–5.1)
PROT SERPL-MCNC: 8.3 GM/DL (ref 5.8–7.6)
RBC # BLD AUTO: 4.56 X10(6)/MCL (ref 4.7–6.1)
SODIUM SERPL-SCNC: 140 MMOL/L (ref 136–145)
T PALLIDUM AB SER QL: REACTIVE
WBC # SPEC AUTO: 5.1 X10(3)/MCL (ref 4.5–11.5)

## 2023-01-11 PROCEDURE — 86780 TREPONEMA PALLIDUM: CPT | Performed by: NURSE PRACTITIONER

## 2023-01-11 PROCEDURE — 85025 COMPLETE CBC W/AUTO DIFF WBC: CPT | Performed by: NURSE PRACTITIONER

## 2023-01-11 PROCEDURE — 80053 COMPREHEN METABOLIC PANEL: CPT | Performed by: NURSE PRACTITIONER

## 2023-01-11 PROCEDURE — 82306 VITAMIN D 25 HYDROXY: CPT | Performed by: NURSE PRACTITIONER

## 2023-01-11 PROCEDURE — 87536 HIV-1 QUANT&REVRSE TRNSCRPJ: CPT | Performed by: NURSE PRACTITIONER

## 2023-01-11 PROCEDURE — 99214 OFFICE O/P EST MOD 30 MIN: CPT | Mod: PBBFAC,25 | Performed by: NURSE PRACTITIONER

## 2023-01-11 PROCEDURE — 99215 OFFICE O/P EST HI 40 MIN: CPT | Mod: S$PBB,,, | Performed by: NURSE PRACTITIONER

## 2023-01-11 PROCEDURE — 87522 HEPATITIS C REVRS TRNSCRPJ: CPT | Performed by: NURSE PRACTITIONER

## 2023-01-11 PROCEDURE — 86361 T CELL ABSOLUTE COUNT: CPT | Performed by: NURSE PRACTITIONER

## 2023-01-11 PROCEDURE — 87340 HEPATITIS B SURFACE AG IA: CPT | Performed by: NURSE PRACTITIONER

## 2023-01-11 PROCEDURE — 86592 SYPHILIS TEST NON-TREP QUAL: CPT | Performed by: NURSE PRACTITIONER

## 2023-01-11 PROCEDURE — 99215 PR OFFICE/OUTPT VISIT, EST, LEVL V, 40-54 MIN: ICD-10-PCS | Mod: S$PBB,,, | Performed by: NURSE PRACTITIONER

## 2023-01-11 PROCEDURE — 36415 COLL VENOUS BLD VENIPUNCTURE: CPT | Performed by: NURSE PRACTITIONER

## 2023-01-11 PROCEDURE — G0008 ADMIN INFLUENZA VIRUS VAC: HCPCS | Mod: PBBFAC

## 2023-01-11 RX ORDER — LEVOTHYROXINE SODIUM 75 UG/1
75 TABLET ORAL
Qty: 30 TABLET | Refills: 3 | Status: SHIPPED | OUTPATIENT
Start: 2023-01-11 | End: 2023-02-22 | Stop reason: SDUPTHER

## 2023-01-11 RX ORDER — OMEPRAZOLE 20 MG/1
20 CAPSULE, DELAYED RELEASE ORAL DAILY
Qty: 30 CAPSULE | Refills: 1 | Status: SHIPPED | OUTPATIENT
Start: 2023-01-11 | End: 2023-02-22 | Stop reason: SDUPTHER

## 2023-01-11 RX ORDER — EMTRICITABINE AND TENOFOVIR ALAFENAMIDE 200; 25 MG/1; MG/1
TABLET ORAL DAILY
COMMUNITY
End: 2023-01-11 | Stop reason: SDUPTHER

## 2023-01-11 RX ORDER — ACETAMINOPHEN 500 MG
2000 TABLET ORAL DAILY
Qty: 30 CAPSULE | Refills: 3 | Status: SHIPPED | OUTPATIENT
Start: 2023-01-11 | End: 2023-05-12 | Stop reason: SDUPTHER

## 2023-01-11 RX ORDER — EMTRICITABINE AND TENOFOVIR ALAFENAMIDE 200; 25 MG/1; MG/1
1 TABLET ORAL DAILY
Qty: 30 TABLET | Refills: 3 | Status: SHIPPED | OUTPATIENT
Start: 2023-01-11 | End: 2023-06-12 | Stop reason: SDUPTHER

## 2023-01-11 RX ORDER — AMLODIPINE BESYLATE 5 MG/1
10 TABLET ORAL DAILY
Qty: 60 TABLET | Refills: 3 | Status: SHIPPED | OUTPATIENT
Start: 2023-01-11 | End: 2023-02-22

## 2023-01-11 RX ORDER — TADALAFIL 5 MG/1
5 TABLET ORAL DAILY PRN
Qty: 10 TABLET | Refills: 2 | Status: SHIPPED | OUTPATIENT
Start: 2023-01-11 | End: 2023-02-22 | Stop reason: SDUPTHER

## 2023-01-11 RX ORDER — LOSARTAN POTASSIUM 50 MG/1
50 TABLET ORAL DAILY
Qty: 30 TABLET | Refills: 3 | Status: SHIPPED | OUTPATIENT
Start: 2023-01-11 | End: 2023-02-22 | Stop reason: SDUPTHER

## 2023-01-11 RX ORDER — DOLUTEGRAVIR SODIUM 50 MG/1
1 TABLET, FILM COATED ORAL DAILY
Qty: 30 TABLET | Refills: 3 | Status: SHIPPED | OUTPATIENT
Start: 2023-01-11 | End: 2023-06-12 | Stop reason: SDUPTHER

## 2023-01-11 NOTE — TELEPHONE ENCOUNTER
----- Message from ALCON Wallis sent at 1/11/2023 10:41 AM CST -----  Vitamin D level low at 24.7. Normal 30-80. Please let pt know daily vitamin D supplement has been sent to his pharmacy. Thank you

## 2023-01-11 NOTE — TELEPHONE ENCOUNTER
Called and spoke with patient. Informed him of results and Destiney's recommendations. Patient voiced understanding.

## 2023-01-11 NOTE — PROGRESS NOTES
Vitamin D level low at 24.7. Normal 30-80. Please let pt know daily vitamin D supplement has been sent to his pharmacy. Thank you

## 2023-01-12 LAB
AGE: 67
CD3+CD4+ CELLS # BLD: 31.6 % (ref 28–48)
CD3+CD4+ CELLS # SPEC: 742.95 UNIT/L (ref 589–1505)
CD3+CD4+ CELLS NFR BLD: 46.1 %
HCV RNA SERPL NAA+PROBE-ACNC: NORMAL IU/ML
HIV1 RNA # PLAS NAA DL=20: <20 COPIES/ML
LYMPHOCYTES # BLD AUTO: 1611.6 X10(3)/MCL (ref 1260–5520)
LYMPHOMA - T-CELL MARKERS SPEC-IMP: NORMAL
WBC # BLD AUTO: 5100 /MM3 (ref 4500–11500)

## 2023-01-13 LAB
RPR SER QL: NORMAL
RPR SER-TITR: NORMAL {TITER}

## 2023-01-18 LAB — T PALLIDUM AB SER QL: REACTIVE

## 2023-02-22 ENCOUNTER — OFFICE VISIT (OUTPATIENT)
Dept: ORTHOPEDICS | Facility: CLINIC | Age: 68
End: 2023-02-22
Payer: MEDICARE

## 2023-02-22 ENCOUNTER — OFFICE VISIT (OUTPATIENT)
Dept: FAMILY MEDICINE | Facility: CLINIC | Age: 68
End: 2023-02-22
Payer: MEDICARE

## 2023-02-22 ENCOUNTER — HOSPITAL ENCOUNTER (OUTPATIENT)
Dept: RADIOLOGY | Facility: CLINIC | Age: 68
Discharge: HOME | End: 2023-02-22
Attending: SPECIALIST
Payer: MEDICARE

## 2023-02-22 VITALS
WEIGHT: 183 LBS | HEART RATE: 111 BPM | SYSTOLIC BLOOD PRESSURE: 143 MMHG | BODY MASS INDEX: 27.74 KG/M2 | HEIGHT: 68 IN | DIASTOLIC BLOOD PRESSURE: 71 MMHG

## 2023-02-22 VITALS
SYSTOLIC BLOOD PRESSURE: 134 MMHG | BODY MASS INDEX: 27.79 KG/M2 | WEIGHT: 183.38 LBS | DIASTOLIC BLOOD PRESSURE: 71 MMHG | RESPIRATION RATE: 18 BRPM | HEIGHT: 68 IN | TEMPERATURE: 98 F | HEART RATE: 100 BPM | OXYGEN SATURATION: 100 %

## 2023-02-22 DIAGNOSIS — Z96.642 STATUS POST LEFT HIP REPLACEMENT: Primary | ICD-10-CM

## 2023-02-22 DIAGNOSIS — Z96.642 STATUS POST LEFT HIP REPLACEMENT: ICD-10-CM

## 2023-02-22 DIAGNOSIS — L84 SKIN CALLUS: ICD-10-CM

## 2023-02-22 DIAGNOSIS — Z76.89 ENCOUNTER TO ESTABLISH CARE WITH NEW DOCTOR: Primary | ICD-10-CM

## 2023-02-22 DIAGNOSIS — Z76.0 MEDICATION REFILL: ICD-10-CM

## 2023-02-22 DIAGNOSIS — I10 ESSENTIAL HYPERTENSION: ICD-10-CM

## 2023-02-22 DIAGNOSIS — N52.9 ERECTILE DYSFUNCTION, UNSPECIFIED ERECTILE DYSFUNCTION TYPE: ICD-10-CM

## 2023-02-22 DIAGNOSIS — K21.9 GASTROESOPHAGEAL REFLUX DISEASE, UNSPECIFIED WHETHER ESOPHAGITIS PRESENT: ICD-10-CM

## 2023-02-22 DIAGNOSIS — E03.9 HYPOTHYROIDISM, UNSPECIFIED TYPE: ICD-10-CM

## 2023-02-22 DIAGNOSIS — M70.62 GREATER TROCHANTERIC BURSITIS OF LEFT HIP: ICD-10-CM

## 2023-02-22 PROCEDURE — 99213 OFFICE O/P EST LOW 20 MIN: CPT | Mod: ,,, | Performed by: SPECIALIST

## 2023-02-22 PROCEDURE — 99213 OFFICE O/P EST LOW 20 MIN: CPT | Mod: PBBFAC

## 2023-02-22 PROCEDURE — 73502 XR HIP WITH PELVIS WHEN PERFORMED, 2 OR 3 VIEWS LEFT: ICD-10-PCS | Mod: LT,,, | Performed by: SPECIALIST

## 2023-02-22 PROCEDURE — 73502 X-RAY EXAM HIP UNI 2-3 VIEWS: CPT | Mod: LT,,, | Performed by: SPECIALIST

## 2023-02-22 PROCEDURE — 99213 PR OFFICE/OUTPT VISIT, EST, LEVL III, 20-29 MIN: ICD-10-PCS | Mod: ,,, | Performed by: SPECIALIST

## 2023-02-22 RX ORDER — AMLODIPINE BESYLATE 10 MG/1
10 TABLET ORAL DAILY
Qty: 90 TABLET | Refills: 1 | Status: SHIPPED | OUTPATIENT
Start: 2023-02-22 | End: 2023-05-12 | Stop reason: SDUPTHER

## 2023-02-22 RX ORDER — HYDROCODONE BITARTRATE AND ACETAMINOPHEN 10; 325 MG/1; MG/1
TABLET ORAL
COMMUNITY
Start: 2022-12-10 | End: 2023-02-22

## 2023-02-22 RX ORDER — LOSARTAN POTASSIUM 50 MG/1
50 TABLET ORAL DAILY
Qty: 90 TABLET | Refills: 0 | Status: SHIPPED | OUTPATIENT
Start: 2023-02-22 | End: 2023-05-12 | Stop reason: SDUPTHER

## 2023-02-22 RX ORDER — TRAMADOL HYDROCHLORIDE 50 MG/1
50 TABLET ORAL EVERY 8 HOURS PRN
Qty: 90 TABLET | Refills: 0 | Status: SHIPPED | OUTPATIENT
Start: 2023-03-22 | End: 2023-04-21

## 2023-02-22 RX ORDER — TADALAFIL 5 MG/1
5 TABLET ORAL DAILY PRN
Qty: 10 TABLET | Refills: 2 | Status: SHIPPED | OUTPATIENT
Start: 2023-02-22 | End: 2023-03-23 | Stop reason: SDUPTHER

## 2023-02-22 RX ORDER — LEVOTHYROXINE SODIUM 75 UG/1
75 TABLET ORAL
Qty: 90 TABLET | Refills: 0 | Status: SHIPPED | OUTPATIENT
Start: 2023-02-22 | End: 2023-05-12 | Stop reason: SDUPTHER

## 2023-02-22 RX ORDER — OMEPRAZOLE 20 MG/1
20 CAPSULE, DELAYED RELEASE ORAL DAILY
Qty: 90 CAPSULE | Refills: 0 | Status: SHIPPED | OUTPATIENT
Start: 2023-02-22 | End: 2023-05-12 | Stop reason: SDUPTHER

## 2023-02-22 RX ORDER — TRAMADOL HYDROCHLORIDE 50 MG/1
50 TABLET ORAL EVERY 8 HOURS PRN
Qty: 90 TABLET | Refills: 0 | Status: SHIPPED | OUTPATIENT
Start: 2023-02-22 | End: 2023-03-24

## 2023-02-22 NOTE — PROGRESS NOTES
[facility]    Subjective:       Patient ID: Renetta Glover Jr. is a 67 y.o. male.    Chief Complaint: Establish Care      Left hip pain s/p hip replacement  - hx avascular necrosis  - Completed PT. Following with Orthopedics.   - Rt hip replaced with resolution of pain. Current hip pain 7/10 constant.   - Ambulating with cane    Callus  - Chronic; recurring since early 20's    Chronic:  GERD- omeprazole  ED- Cialis  HTN - recently added losartan to amlodipine 10 mg  Hypothyroidism- synthroid 75 mcg   HIV- Tivicay & Descovy. Recent ID appt.  CKD 3a- following with Nephrology    Health Maintenance  Pt reported recent colonoscopy- need records    Social:  Smokes 3 cigarettes daily for last 46 years.   Drinks 2 beers monthly.  Cocaine use     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    History of syphilis 05/12/2022    Human immunodeficiency virus (HIV) disease     Hypertension      Past Surgical History:   Procedure Laterality Date    ESOPHAGOGASTRODUODENOSCOPY N/A 06/20/2022    Procedure: EGD (ESOPHAGOGASTRODUODENOSCOPY);  Surgeon: Emile Tang MD;  Location: [facility];  Service: Gastroenterology;  Laterality: N/A;    HIP REPLACEMENT ARTHROPLASTY Right 02/02/2021    HIP SURGERY Left 08/18/2022    ROBOTIC ARTHROPLASTY, HIP Left 08/18/2022    Procedure: ROBOTIC ARTHROPLASTY,HIP;  Surgeon: Bairon Bass MD;  Location: [facility];  Service: Orthopedics;  Laterality: Left;       Review of Systems   Constitutional:  Negative for activity change, appetite change, fatigue and fever.   Respiratory:  Negative for cough and shortness of breath.    Cardiovascular:  Negative for chest pain and leg swelling.   Gastrointestinal:  Negative for abdominal pain, change in bowel habit and change in bowel habit.   Musculoskeletal:  Positive for arthralgias.        Left hip pain   Neurological:   Negative for dizziness, light-headedness and headaches.       Objective:      Vitals:    02/22/23 1054   BP: 134/71   Pulse: 100   Resp: 18   Temp: 98.2 °F (36.8 °C)       Physical Exam  Constitutional:       General: He is not in acute distress.     Appearance: He is not ill-appearing.   Cardiovascular:      Rate and Rhythm: Normal rate and regular rhythm.      Pulses: Normal pulses.      Heart sounds: No murmur heard.  Pulmonary:      Effort: Pulmonary effort is normal.      Breath sounds: Normal breath sounds. No wheezing.   Abdominal:      General: Bowel sounds are normal.      Palpations: Abdomen is soft.      Tenderness: There is no abdominal tenderness.   Musculoskeletal:      Right lower leg: No edema.      Left lower leg: No edema.      Comments: Ambulating with cane   Skin:     General: Skin is warm and dry.      Comments: callus formation palmar surface B/L hands       Assessment:       Problem List Items Addressed This Visit          Cardiac/Vascular    Essential hypertension    Relevant Medications    losartan (COZAAR) 50 MG tablet       Renal/    Erectile dysfunction    Relevant Medications    tadalafiL (CIALIS) 5 MG tablet       Endocrine    Hypothyroidism    Relevant Medications    levothyroxine (SYNTHROID) 75 MCG tablet       GI    Gastroesophageal reflux disease    Relevant Medications    omeprazole (PRILOSEC) 20 MG capsule     Other Visit Diagnoses       Encounter to establish care with new doctor    -  Primary    Medication refill                Plan:       Left hip pain s/p hip replacement  - Completed PT  - previously on percocet 10  - Tramadol 50 mg 2-3 times daily PRN  - Plan to decrease pain medications as left hip improves     Hypothyroidism  - Pt defer labs today.  - Med refill provided     GERD  - Controlled   - refill omeprazole provided    HTN  - Controlled  - Continue current regimen of losartan and amlodipine. Refills provided.      ED   - Cialis refill provided    Callus  -  Chronic  - Trial [facility] working hands lotion    Health Maintenance  - obtain records for colonoscopy  - Need TSH, T4, PSA, lipid, A1c, and Vitamin D next visit. Declined labs today    RTC in 2 months      Magaly Puga M.D.  \[facility]\"" Family Medicine HO-2

## 2023-02-22 NOTE — PROGRESS NOTES
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    History of syphilis 05/12/2022    Human immunodeficiency virus (HIV) disease     Hypertension        Past Surgical History:   Procedure Laterality Date    ESOPHAGOGASTRODUODENOSCOPY N/A 06/20/2022    Procedure: EGD (ESOPHAGOGASTRODUODENOSCOPY);  Surgeon: Emile Tang MD;  Location: [facility];  Service: Gastroenterology;  Laterality: N/A;    HIP REPLACEMENT ARTHROPLASTY Right 02/02/2021    HIP SURGERY Left 08/18/2022    ROBOTIC ARTHROPLASTY, HIP Left 08/18/2022    Procedure: ROBOTIC ARTHROPLASTY,HIP;  Surgeon: Bairon Bass MD;  Location: [facility] OR;  Service: Orthopedics;  Laterality: Left;       Current Outpatient Medications   Medication Sig    acetaminophen (TYLENOL) 325 MG tablet     amitriptyline (ELAVIL) 50 MG tablet Take 50 mg by mouth nightly.    amLODIPine (NORVASC) 5 MG tablet Take 2 tablets (10 mg total) by mouth Daily. For blood pressure    ammonium lactate 12 % Crea Apply topically 2 (two) times a day. Apply to feet    aspirin (ECOTRIN) 81 MG EC tablet Take 1 tablet (81 mg total) by mouth 2 (two) times a day. Increase to twice a day for 6 weeks and then restart daily dose on 10/4/22    cholecalciferol, vitamin D3, (VITAMIN D3) 50 mcg (2,000 unit) Cap capsule Take 1 capsule (2,000 Units total) by mouth once daily. For low vitamin D level    dolutegravir (TIVICAY) 50 mg Tab Take 1 tablet (50 mg total) by mouth once daily.    emtricitabine-tenofovir alafen (DESCOVY) 200-25 mg Tab Take 1 tablet by mouth Daily.    HYDROcodone-acetaminophen (NORCO)  mg per tablet Take by mouth.    levothyroxine (SYNTHROID) 75 MCG tablet Take 1 tablet (75 mcg total) by mouth before breakfast. For thyroid    losartan (COZAAR) 50 MG tablet Take 1 tablet (50 mg total) by mouth once daily. For blood pressure    omeprazole (PRILOSEC) 20 MG capsule Take 1  "capsule (20 mg total) by mouth once daily. For reflux    oxyCODONE-acetaminophen (PERCOCET)  mg per tablet 1 tablet as needed.    tadalafiL (CIALIS) 5 MG tablet Take 1 tablet (5 mg total) by mouth daily as needed for Erectile Dysfunction (PRN sexual activity).     No current facility-administered medications for this visit.       Review of patient's allergies indicates:  No Known Allergies    Family History   Problem Relation Age of Onset    Diabetes Mother     Diabetes Brother        Social History     Socioeconomic History    Marital status:    Tobacco Use    Smoking status: Every Day     Packs/day: 0.25     Years: 46.00     Pack years: 11.50     Types: Cigarettes     Start date: 1/1/1976    Smokeless tobacco: Current    Tobacco comments:     less than 1/2 daily   Substance and Sexual Activity    Alcohol use: Yes     Alcohol/week: 4.0 standard drinks     Types: 4 Shots of liquor per week     Comment: Occas    Drug use: Yes     Types: "Crack" cocaine     Comment: "very seldom"    Sexual activity: Yes     Partners: Female     Birth control/protection: Condom       Chief Complaint:   Chief Complaint   Patient presents with    Follow-up     Pt states he is still experiencing a constant pain in his hip. Pt describes his pain as a sharp and thobbing at times whcih unable him to sleep at night If he doesn't take his imsomnia medication.       Consulting Physician: No ref. provider found    History of present illness:    This is a 67 y.o. year old male who complains of left lateral hip pain.  He has no weight-bearing pain.  It hurts when he lies on his left side.  He is no thigh or groin pain.  He has no limp.    Review of Systems:    Constitution:   Denies chills, fever, and sweats.  HENT:   Denies headaches or blurry vision.  Cardiovascular:  Denies chest pain or irregular heart beat.  Respiratory:   Denies cough or shortness of breath.  Gastrointestinal:  Denies abdominal pain, nausea, or " "vomiting.  Musculoskeletal:   Denies muscle cramps.  Neurological:   Denies dizziness or focal weakness.  Psychiatric/Behavior: Normal mental status.  Hematology/Lymph:  Denies bleeding problem or easy bruising/bleeding.  Skin:    Denies rash or suspicious lesions.    Examination:    Vital Signs:    Vitals:    02/22/23 0920 02/22/23 0924   BP: (!) 143/71    Pulse: (!) 111    Weight: 83 kg (183 lb)    Height: 5' 8" (1.727 m)    PainSc:    7       Body mass index is 27.83 kg/m².    Constitution:   Well-developed, well nourished patient in no acute distress.  Neurological:   Alert and oriented x 3 and cooperative to examination.     Psychiatric/Behavior: Normal mental status.  Respiratory:   No shortness of breath.  Eyes:    Extraoccular muscles intact  Skin:    No scars, rash or suspicious lesions.    Physical Exam:  Left hip exam:  2+ dorsal pedal pulse   2+ posterior tibial pulse   No swelling, no redness, no increased heat   No atrophy  Normal gait   No weakness of the abductors on Trendelenburg testing   No tenderness with range of motion   No groin or thigh tenderness with weight-bearing, ambulation, or palpation   Mild tenderness to palpation over the left greater trochanteric bursa  Well-healed scar         Assessment: Status post left hip replacement    Greater trochanteric bursitis of left hip        Plan:  Home exercises   Reassured the patient that his greater trochanteric bursal tenderness should resolve over time as he approaches a year from his operation.  He is currently 6 months postop from left total hip arthroplasty and overall is doing well.  I will see him back in 6 months for his 1 year checkup with clinical exam and radiographs of his left hip.      DISCLAIMER: This note may have been dictated using voice recognition software and may contain grammatical errors.     NOTE: Consult report sent to referring provider via EP[employer] EMR.    "

## 2023-02-22 NOTE — PROGRESS NOTES
Discussed with resident at time of encounter (02-22-23); pt. seen.  Initial [facility] visit.  Multiple co-morbidities; hx. of bilateral hip replacement.    PE  Gen: NAD; ambulatory with cane.  HEENT: Sclerae anicteric; Poor dentition.    Chest: lungs clear.  CV: reg. rhythm, no murmurs.    Resident's note reviewed.  Agree with assessment; plan of care appropriate.  Medical records review.  Professional services provided in an outpatient primary care center affiliated with a teaching institution.

## 2023-03-17 ENCOUNTER — TELEPHONE (OUTPATIENT)
Dept: NEPHROLOGY | Facility: CLINIC | Age: 68
End: 2023-03-17
Payer: MEDICARE

## 2023-03-17 ENCOUNTER — CLINICAL SUPPORT (OUTPATIENT)
Dept: OPHTHALMOLOGY | Facility: CLINIC | Age: 68
End: 2023-03-17
Payer: MEDICARE

## 2023-03-17 DIAGNOSIS — B20 HIV DISEASE: Primary | ICD-10-CM

## 2023-03-17 PROCEDURE — 99211 OFF/OP EST MAY X REQ PHY/QHP: CPT | Mod: PBBFAC,PO

## 2023-03-17 PROCEDURE — 92250 FUNDUS PHOTOGRAPHY W/I&R: CPT | Mod: PBBFAC,PO

## 2023-03-17 PROCEDURE — 92250 FUNDUS PHOTOGRAPHY W/I&R: CPT | Mod: PBBFAC,PO | Performed by: OPHTHALMOLOGY

## 2023-03-17 NOTE — PROGRESS NOTES
Assessment /Plan     For exam results, see Encounter Report.    HIV disease  -     Ambulatory referral/consult to Ophthalmology      Fundus Photo done

## 2023-03-17 NOTE — TELEPHONE ENCOUNTER
----- Message from Swapna Coppola sent at 3/17/2023 11:49 AM CDT -----  Regarding: Send lab orders to [facility]  Pt's sister Malena called pt is karen 3/23 Kim and they are asking the lab orders be sent to [facility]. Pt info is [phone]        Thank You  Kayleen MELENDEZ

## 2023-03-17 NOTE — TELEPHONE ENCOUNTER
Spoke with patient's sister:  please fax labs to [facility]  Fax received

## 2023-03-23 ENCOUNTER — OFFICE VISIT (OUTPATIENT)
Dept: NEPHROLOGY | Facility: CLINIC | Age: 68
End: 2023-03-23
Payer: MEDICARE

## 2023-03-23 VITALS
RESPIRATION RATE: 20 BRPM | WEIGHT: 182.81 LBS | BODY MASS INDEX: 27.71 KG/M2 | HEART RATE: 99 BPM | OXYGEN SATURATION: 99 % | HEIGHT: 68 IN | DIASTOLIC BLOOD PRESSURE: 70 MMHG | SYSTOLIC BLOOD PRESSURE: 120 MMHG | TEMPERATURE: 99 F

## 2023-03-23 DIAGNOSIS — I10 ESSENTIAL HYPERTENSION: ICD-10-CM

## 2023-03-23 DIAGNOSIS — Z72.0 TOBACCO USER: ICD-10-CM

## 2023-03-23 DIAGNOSIS — N52.9 ERECTILE DYSFUNCTION, UNSPECIFIED ERECTILE DYSFUNCTION TYPE: ICD-10-CM

## 2023-03-23 DIAGNOSIS — N18.32 CKD STAGE G3B/A2, GFR 30-44 AND ALBUMIN CREATININE RATIO 30-299 MG/G: Primary | ICD-10-CM

## 2023-03-23 PROCEDURE — 99214 OFFICE O/P EST MOD 30 MIN: CPT | Mod: PBBFAC | Performed by: NURSE PRACTITIONER

## 2023-03-23 PROCEDURE — 99214 PR OFFICE/OUTPT VISIT, EST, LEVL IV, 30-39 MIN: ICD-10-PCS | Mod: S$PBB,,, | Performed by: NURSE PRACTITIONER

## 2023-03-23 PROCEDURE — 99214 OFFICE O/P EST MOD 30 MIN: CPT | Mod: S$PBB,,, | Performed by: NURSE PRACTITIONER

## 2023-03-23 RX ORDER — TADALAFIL 5 MG/1
5 TABLET ORAL DAILY PRN
Qty: 10 TABLET | Refills: 2 | Status: SHIPPED | OUTPATIENT
Start: 2023-03-23 | End: 2023-06-12 | Stop reason: SDUPTHER

## 2023-03-23 NOTE — PROGRESS NOTES
67 year old with HIV presented for fundus photos.  The quality is too poor to evaluate.  Will have the patient return for a dilated fundus exam..

## 2023-03-23 NOTE — PROGRESS NOTES
" [facility]  Nephrology Clinic Note    Chief complaint: Chronic Kidney Disease (Follow up)    History of present illness:   Renetta Glover Jr. is a 67 y.o. Other male with past medical history of chronic kidney disease, HIV, treated hepatitis C, syphilis, arthritis, nephrolithiasis, past history of polysubstance abuse, and hypertension. Presents for follow-up appointment in nephrology clinic. Denies complaints.    Review of Systems  12 point review of systems conducted, negative except as stated in the history of present illness.    Allergies: Patient has No Known Allergies.     Past Medical History:  has a past medical history of Disorder of kidney and ureter, GERD (gastroesophageal reflux disease), History of cryptococcal meningitis, History of syphilis, Human immunodeficiency virus (HIV) disease, and Hypertension.    Procedure History:  has a past surgical history that includes Hip replacement arthroplasty (Right, 02/02/2021); Esophagogastroduodenoscopy (N/A, 06/20/2022); robotic arthroplasty, hip (Left, 08/18/2022); and Hip surgery (Left, 08/18/2022).    Family History: family history includes Diabetes in his brother and mother.    Social History:  reports that he has been smoking cigarettes. He started smoking about 47 years ago. He has a 11.50 pack-year smoking history. He uses smokeless tobacco. He reports that he does not currently use alcohol after a past usage of about 4.0 standard drinks per week. He reports current drug use. Drug: "Crack" cocaine.    Physical exam  /70 (BP Location: Left arm, Patient Position: Sitting, BP Method: Medium (Automatic))   Pulse 99   Temp 98.6 °F (37 °C) (Oral)   Resp 20   Ht 5' 8" (1.727 m)   Wt 82.9 kg (182 lb 12.8 oz)   SpO2 99%   BMI 27.79 kg/m²   General appearance: Patient is in no acute distress.  Skin: No rashes or wounds.  HEENT: PERRLA, EOMI, no scleral icterus, no JVD. Neck is supple.  Chest: Respirations are unlabored. " Lungs sounds are clear.   Heart: S1, S2.   Abdomen: Benign.  : Deferred.  Extremities: No edema, peripheral pulses are palpable.   Neuro: No focal deficits.     Home Medications:    Current Outpatient Medications:     amitriptyline (ELAVIL) 50 MG tablet, Take 50 mg by mouth nightly., Disp: , Rfl:     amLODIPine (NORVASC) 10 MG tablet, Take 1 tablet (10 mg total) by mouth once daily., Disp: 90 tablet, Rfl: 1    ammonium lactate 12 % Crea, Apply topically 2 (two) times a day. Apply to feet, Disp: , Rfl:     aspirin (ECOTRIN) 81 MG EC tablet, Take 1 tablet (81 mg total) by mouth 2 (two) times a day. Increase to twice a day for 6 weeks and then restart daily dose on 10/4/22, Disp: 84 tablet, Rfl: 0    cholecalciferol, vitamin D3, (VITAMIN D3) 50 mcg (2,000 unit) Cap capsule, Take 1 capsule (2,000 Units total) by mouth once daily. For low vitamin D level, Disp: 30 capsule, Rfl: 3    dolutegravir (TIVICAY) 50 mg Tab, Take 1 tablet (50 mg total) by mouth once daily., Disp: 30 tablet, Rfl: 3    emtricitabine-tenofovir alafen (DESCOVY) 200-25 mg Tab, Take 1 tablet by mouth Daily., Disp: 30 tablet, Rfl: 3    levothyroxine (SYNTHROID) 75 MCG tablet, Take 1 tablet (75 mcg total) by mouth before breakfast. For thyroid, Disp: 90 tablet, Rfl: 0    losartan (COZAAR) 50 MG tablet, Take 1 tablet (50 mg total) by mouth once daily. For blood pressure, Disp: 90 tablet, Rfl: 0    omeprazole (PRILOSEC) 20 MG capsule, Take 1 capsule (20 mg total) by mouth once daily. For reflux, Disp: 90 capsule, Rfl: 0    tadalafiL (CIALIS) 5 MG tablet, Take 1 tablet (5 mg total) by mouth daily as needed for Erectile Dysfunction (PRN sexual activity)., Disp: 10 tablet, Rfl: 2    traMADoL (ULTRAM) 50 mg tablet, Take 1 tablet (50 mg total) by mouth every 8 (eight) hours as needed for Pain (2-3 times daily PRN left hip pain)., Disp: 90 tablet, Rfl: 0    traMADoL (ULTRAM) 50 mg tablet, Take 1 tablet (50 mg total) by mouth every 8 (eight) hours as needed for  Pain (2-3 times daily PRN left hip pain)., Disp: 90 tablet, Rfl: 0    Laboratory data    Serum  Lab Results   Component Value Date    WBC 5.7 03/23/2023    HGB 12.3 (L) 03/23/2023    HCT 37.2 (L) 03/23/2023     03/23/2023    IRON 75 03/28/2019    TIBC 328 03/28/2019    TRANS 263.0 03/28/2019    LABIRON 22.9 03/28/2019    FERRITIN 539.6 (H) 07/13/2017    FOLATE 17.9 (H) 07/12/2018    CKKLLXGW60 261 07/12/2018     07/24/2017     03/23/2023    K 4.3 03/23/2023    CHLORIDE 107 03/23/2023    CO2 24 03/23/2023    BUN 24.8 03/23/2023    CREATININE 2.15 (H) 03/23/2023    EGFRNORACEVR 33 03/23/2023    GLUCOSE 95 03/23/2023    CALCIUM 9.5 03/23/2023    ALKPHOS 111 03/23/2023    LABPROT 8.3 (H) 03/23/2023    ALBUMIN 3.9 03/23/2023    BILIDIR 0.1 01/20/2022    IBILI 0.10 01/20/2022    AST 24 03/23/2023    ALT 19 03/23/2023    MG 1.9 08/04/2017    PHOS 2.7 08/04/2017      Lab Results   Component Value Date    HGBA1C 5.6 01/15/2021    OKVLZPPO80JR 24.7 (L) 01/11/2023    HIV Reactive (A) 09/05/2019    HEPCAB Reactive (A) 08/05/2021    HEPBSURFAG Nonreactive 01/11/2023    HEPBSAB Reactive (A) 08/15/2022     Urine:  Lab Results   Component Value Date    COLORUA Light-Yellow 03/23/2023    APPEARANCEUA Clear 03/23/2023    SGUA 1.019 03/23/2023    PHUA 6.0 03/23/2023    PROTEINUA 1+ (A) 03/23/2023    GLUCOSEUA Normal 03/23/2023    KETONESUA Trace (A) 03/23/2023    BLOODUA Negative 03/23/2023    NITRITESUA Negative 03/23/2023    LEUKOCYTESUR 250 (A) 03/23/2023    RBCUA 0-5 03/23/2023    WBCUA 21-50 (A) 03/23/2023    BACTERIA None Seen 03/23/2023    SQEPUA Trace (A) 03/23/2023    HYALINECASTS 3-5 (A) 03/23/2023    CREATRANDUR 233.9 (H) 03/23/2023    PROTEINURINE 34.4 03/23/2023    UPROTCREA 0.1 03/23/2023         Imaging  CT of abdomen and pelvis with and without contrast 07/14/2017:  ADRENALS: No adrenal nodules.  KIDNEYS/URETERS: Evaluation for radiodense stones limited by contrast  in the bilateral collecting  systems. No hydronephrosis or solid renal  masses.    Impression and plan     CKD stage G3b/A2, GFR 30-44 and albumin creatinine ratio  mg/g  Likely hypertensive kidney disease.  Renal indices are stable.  Continue risk factor management and periodic monitoring.    Continue:  -follow 2 g a day dietary sodium restriction  -control high blood pressure (goal blood pressure is less than 130/80, please check blood pressure twice a week and bring blood pressure logs to office visit)  -exercise at least 30 minutes a day, 5 days a week  -maintain healthy weight  -decrease or stop alcohol use  -do not smoke  -stay well hydrated (drink water only, avoid juices, sweet tea, and sodas)  -ask about staying up-to-date on vaccinations (flu vaccine, pneumonia vaccine, hepatitis B vaccine)  -avoid excessive use of NSAIDs (ibuprofen, naproxen, Aleve, Advil, Toradol, Mobic), take Tylenol as needed for headache or mild pain  -take cholesterol lowering medications if prescribed (LDL goal less than 100)    Follow-up with the primary care provider as scheduled.  Follow up in about 6 months (around 9/23/2023).    Essential hypertension  Blood pressure reading is at goal, continue current antihypertensive regimen and 2 g a day dietary sodium restriction.      Tobacco user  Patient was counseled on importance of tobacco use cessation.  Strongly encouraged to quit, offered a referral to a smoking cessation program.    BMI 27.0-27.9,adult  Lifestyle and dietary interventions discussed, patient counseled on weight loss using portion control, non- sedentary lifestyle, low-carbohydrate/low fat diet.     3/23/2023  Alison Lemon NP  [facility]

## 2023-05-12 ENCOUNTER — OFFICE VISIT (OUTPATIENT)
Dept: FAMILY MEDICINE | Facility: CLINIC | Age: 68
End: 2023-05-12
Payer: MEDICARE

## 2023-05-12 VITALS
DIASTOLIC BLOOD PRESSURE: 73 MMHG | HEART RATE: 101 BPM | RESPIRATION RATE: 20 BRPM | WEIGHT: 188 LBS | OXYGEN SATURATION: 98 % | SYSTOLIC BLOOD PRESSURE: 122 MMHG | TEMPERATURE: 98 F | BODY MASS INDEX: 28.59 KG/M2

## 2023-05-12 DIAGNOSIS — I10 ESSENTIAL HYPERTENSION: ICD-10-CM

## 2023-05-12 DIAGNOSIS — B07.9 WART OF HAND: ICD-10-CM

## 2023-05-12 DIAGNOSIS — E03.9 HYPOTHYROIDISM, UNSPECIFIED TYPE: ICD-10-CM

## 2023-05-12 DIAGNOSIS — K21.9 GASTROESOPHAGEAL REFLUX DISEASE, UNSPECIFIED WHETHER ESOPHAGITIS PRESENT: ICD-10-CM

## 2023-05-12 DIAGNOSIS — Z96.643 HISTORY OF TOTAL REPLACEMENT OF BOTH HIP JOINTS: ICD-10-CM

## 2023-05-12 DIAGNOSIS — E55.9 VITAMIN D DEFICIENCY: ICD-10-CM

## 2023-05-12 DIAGNOSIS — M16.12 PRIMARY OSTEOARTHRITIS OF LEFT HIP: Primary | ICD-10-CM

## 2023-05-12 DIAGNOSIS — F14.90 COCAINE USE: ICD-10-CM

## 2023-05-12 PROCEDURE — 99214 OFFICE O/P EST MOD 30 MIN: CPT | Mod: PBBFAC

## 2023-05-12 RX ORDER — CYCLOBENZAPRINE HYDROCHLORIDE 7.5 MG/1
7.5 TABLET, FILM COATED ORAL NIGHTLY
Qty: 10 TABLET | Refills: 0 | Status: SHIPPED | OUTPATIENT
Start: 2023-05-12 | End: 2023-05-22

## 2023-05-12 RX ORDER — OMEPRAZOLE 20 MG/1
20 CAPSULE, DELAYED RELEASE ORAL DAILY
Qty: 90 CAPSULE | Refills: 1 | Status: SHIPPED | OUTPATIENT
Start: 2023-05-12 | End: 2023-08-30 | Stop reason: SDUPTHER

## 2023-05-12 RX ORDER — ACETAMINOPHEN 500 MG
2000 TABLET ORAL DAILY
Qty: 30 CAPSULE | Refills: 3 | Status: SHIPPED | OUTPATIENT
Start: 2023-05-12 | End: 2023-06-12 | Stop reason: SDUPTHER

## 2023-05-12 RX ORDER — LEVOTHYROXINE SODIUM 75 UG/1
75 TABLET ORAL
Qty: 90 TABLET | Refills: 0 | Status: SHIPPED | OUTPATIENT
Start: 2023-05-12

## 2023-05-12 RX ORDER — DEXTROMETHORPHAN HYDROBROMIDE, GUAIFENESIN 5; 100 MG/5ML; MG/5ML
1300 LIQUID ORAL 2 TIMES DAILY PRN
Qty: 120 TABLET | Refills: 2 | Status: SHIPPED | OUTPATIENT
Start: 2023-05-12 | End: 2023-06-12

## 2023-05-12 RX ORDER — LOSARTAN POTASSIUM 50 MG/1
50 TABLET ORAL DAILY
Qty: 90 TABLET | Refills: 1 | Status: SHIPPED | OUTPATIENT
Start: 2023-05-12

## 2023-05-12 RX ORDER — AMLODIPINE BESYLATE 10 MG/1
10 TABLET ORAL DAILY
Qty: 90 TABLET | Refills: 1 | Status: SHIPPED | OUTPATIENT
Start: 2023-05-12

## 2023-05-12 NOTE — PROGRESS NOTES
[facility]  Subjective     Patient ID: Renetta Glover Jr. is a 67 y.o. male.    Chief Complaint: Follow-up, Hip Pain, Medication Problem (Tramadol is not working. ), and Medication Refill (Vitamin D. )    Pt concerned for nodule on finger. He was concerned for something being under his skin.Requests vitamin D refill.     Left hip pain s/p hip replacement  - hx avascular necrosis  - Completed PT. Following with Orthopedics.   - Ambulating with cane  - Continued pain to lateral hip and anterior/lateral thigh  - no relief with tramadol     Callus  - Chronic  - previously rec  Jason working hands. Pt using occasionally with some improvement    Chronic:  GERD- omeprazole  ED- Cialis  HTN - losartan and amlodipine 10 mg  Hypothyroidism- synthroid 75 mcg   HIV- Tivicay & Descovy.   CKD 3a- following with Nephrology    Social:  Smokes 3 cigarettes daily for last 46 years.   Drinks alcohol occasionally.  Cocaine use. Attempting to decrease. Reports decrease in frequency and amount. Last use 1 week ago.      Review of Systems   Constitutional:  Negative for activity change, appetite change, fatigue and fever.   Respiratory:  Negative for cough and shortness of breath.    Cardiovascular:  Negative for chest pain.   Gastrointestinal:  Negative for abdominal pain, change in bowel habit and change in bowel habit.   Musculoskeletal:  Positive for arthralgias, gait problem and leg pain.   Integumentary:  Negative for wound.   Neurological:  Negative for syncope and weakness.        Objective   Vitals:    05/12/23 1457   BP: 122/73   Pulse: 101   Resp: 20   Temp: 98.1 °F (36.7 °C)       Physical Exam  Constitutional:       General: He is not in acute distress.     Appearance: He is not ill-appearing.      Comments: Ambulating with cane   Cardiovascular:      Rate and Rhythm: Normal rate and regular rhythm.   Pulmonary:      Effort: Pulmonary effort is normal.      Breath sounds: Normal breath sounds.    Musculoskeletal:      Right lower leg: No edema.      Left lower leg: No edema.   Skin:     General: Skin is warm and dry.      Comments: Linear callus on palmar suraface B/L palms and digits. Hyperpigmented firm nodule left 2nd digit   Neurological:      Mental Status: He is oriented to person, place, and time.   Psychiatric:         Behavior: Behavior normal.         Thought Content: Thought content normal.          Assessment and Plan     Problem List Items Addressed This Visit          Cardiac/Vascular    Essential hypertension    Relevant Medications    losartan (COZAAR) 50 MG tablet       Endocrine    Vitamin D deficiency    Relevant Medications    cholecalciferol, vitamin D3, (VITAMIN D3) 50 mcg (2,000 unit) Cap capsule    Hypothyroidism    Relevant Medications    levothyroxine (SYNTHROID) 75 MCG tablet       GI    Gastroesophageal reflux disease    Relevant Medications    omeprazole (PRILOSEC) 20 MG capsule       Orthopedic    Primary osteoarthritis of left hip - Primary    Relevant Medications    acetaminophen (TYLENOL) 650 MG TbSR    cyclobenzaprine (FEXMID) 7.5 MG Tab    History of total hip replacement    Relevant Medications    acetaminophen (TYLENOL) 650 MG TbSR    cyclobenzaprine (FEXMID) 7.5 MG Tab     Other Visit Diagnoses       Wart of hand        Relevant Orders    Ambulatory referral/consult to Family Practice            Refill medications as listed above  Flexeril 7.5 mg nightly  Tylenol 650 mg 2 tabs BID  HH for PT. Pt prefer to use same HH as previous will contact w/ name for referral  Instructed F/u w/  ortho 2/2 pain  Encouraged contact ID for refills. Upcoming ID appt.   Encouraged contact if agreeable for cessation assistance  Refer to Procedure clinic  Pt declines labs again today. Attempt again next visit.       RTC in 6 weeks    Magaly Puga M.D.  [facility]

## 2023-05-16 NOTE — PROGRESS NOTES
I have seen the patient, reviewed the Resident's history and physical. I have personally interviewed and examined the patient at bedside and: agree with the findings.     Gen: BM who appears his stated age and is in NAD  Skin: nodule on finger appears to be a wart

## 2023-05-17 ENCOUNTER — TELEPHONE (OUTPATIENT)
Dept: INFECTIOUS DISEASES | Facility: CLINIC | Age: 68
End: 2023-05-17
Payer: MEDICARE

## 2023-05-17 NOTE — TELEPHONE ENCOUNTER
----- Message from ALCON Wallis sent at 2023  2:04 PM CDT -----  Please call pt and check on status of his ART. Thank you  ----- Message -----  From: Magaly Puga MD  Sent: 2023   5:28 PM CDT  To: ALCON Wallis    Pt seen today in clinic. Med list has tivicay and descovy listed as . Pt reports being low on descovy. Just wanted to reach out to let you know.   Thanks for all you do

## 2023-05-17 NOTE — TELEPHONE ENCOUNTER
Last visit with ALCON Wallis: 1/11/2023  Last visit in [facility] INFECTIOUS DISEASE: 1/11/2023    Patient's next visit in [facility] INFECTIOUS DISEASE: 5/30/2023

## 2023-05-17 NOTE — TELEPHONE ENCOUNTER
Attempted to call pt at [phone], his sister answered the phone, she told me he doesn't have a phone.   I asked her to have pt call us.

## 2023-06-12 ENCOUNTER — OFFICE VISIT (OUTPATIENT)
Dept: INFECTIOUS DISEASES | Facility: CLINIC | Age: 68
End: 2023-06-12
Payer: MEDICARE

## 2023-06-12 VITALS
RESPIRATION RATE: 18 BRPM | HEART RATE: 105 BPM | DIASTOLIC BLOOD PRESSURE: 67 MMHG | TEMPERATURE: 99 F | SYSTOLIC BLOOD PRESSURE: 119 MMHG | BODY MASS INDEX: 29.31 KG/M2 | HEIGHT: 68 IN | WEIGHT: 193.38 LBS

## 2023-06-12 DIAGNOSIS — Z23 NEED FOR VACCINATION: ICD-10-CM

## 2023-06-12 DIAGNOSIS — Z72.0 TOBACCO USE: ICD-10-CM

## 2023-06-12 DIAGNOSIS — N18.32 STAGE 3B CHRONIC KIDNEY DISEASE: ICD-10-CM

## 2023-06-12 DIAGNOSIS — E55.9 VITAMIN D DEFICIENCY: ICD-10-CM

## 2023-06-12 DIAGNOSIS — Z86.19 HISTORY OF SYPHILIS: ICD-10-CM

## 2023-06-12 DIAGNOSIS — Z86.19 HEPATITIS C VIRUS INFECTION CURED AFTER ANTIVIRAL DRUG THERAPY: ICD-10-CM

## 2023-06-12 DIAGNOSIS — Z86.19 HISTORY OF CRYPTOCOCCAL MENINGITIS: ICD-10-CM

## 2023-06-12 DIAGNOSIS — B20 HIV DISEASE: Primary | ICD-10-CM

## 2023-06-12 DIAGNOSIS — N52.9 ERECTILE DYSFUNCTION, UNSPECIFIED ERECTILE DYSFUNCTION TYPE: ICD-10-CM

## 2023-06-12 DIAGNOSIS — Z11.3 ROUTINE SCREENING FOR STI (SEXUALLY TRANSMITTED INFECTION): ICD-10-CM

## 2023-06-12 DIAGNOSIS — Z00.00 HEALTH CARE MAINTENANCE: ICD-10-CM

## 2023-06-12 DIAGNOSIS — F14.90 COCAINE USE: ICD-10-CM

## 2023-06-12 LAB
ALBUMIN SERPL-MCNC: 3.9 G/DL (ref 3.4–4.8)
ALBUMIN/GLOB SERPL: 0.9 RATIO (ref 1.1–2)
ALP SERPL-CCNC: 96 UNIT/L (ref 40–150)
ALT SERPL-CCNC: 35 UNIT/L (ref 0–55)
APPEARANCE UR: CLEAR
AST SERPL-CCNC: 32 UNIT/L (ref 5–34)
BACTERIA #/AREA URNS AUTO: ABNORMAL /HPF
BASOPHILS # BLD AUTO: 0.08 X10(3)/MCL
BASOPHILS NFR BLD AUTO: 1.5 %
BILIRUB UR QL STRIP.AUTO: NEGATIVE MG/DL
BILIRUBIN DIRECT+TOT PNL SERPL-MCNC: 0.2 MG/DL
BUN SERPL-MCNC: 18.6 MG/DL (ref 8.4–25.7)
C TRACH DNA SPEC QL NAA+PROBE: NOT DETECTED
CALCIUM SERPL-MCNC: 9.3 MG/DL (ref 8.8–10)
CHLORIDE SERPL-SCNC: 108 MMOL/L (ref 98–107)
CO2 SERPL-SCNC: 25 MMOL/L (ref 23–31)
COLOR UR: ABNORMAL
CREAT SERPL-MCNC: 1.94 MG/DL (ref 0.73–1.18)
DEPRECATED CALCIDIOL+CALCIFEROL SERPL-MC: 34.4 NG/ML (ref 30–80)
EOSINOPHIL # BLD AUTO: 0.3 X10(3)/MCL (ref 0–0.9)
EOSINOPHIL NFR BLD AUTO: 5.7 %
ERYTHROCYTE [DISTWIDTH] IN BLOOD BY AUTOMATED COUNT: 13.7 % (ref 11.5–17)
GFR SERPLBLD CREATININE-BSD FMLA CKD-EPI: 37 MLS/MIN/1.73/M2
GLOBULIN SER-MCNC: 4.4 GM/DL (ref 2.4–3.5)
GLUCOSE SERPL-MCNC: 96 MG/DL (ref 82–115)
GLUCOSE UR QL STRIP.AUTO: NORMAL MG/DL
HBV SURFACE AG SERPL QL IA: NONREACTIVE
HCT VFR BLD AUTO: 40.4 % (ref 42–52)
HGB BLD-MCNC: 13.1 G/DL (ref 14–18)
HYALINE CASTS #/AREA URNS LPF: ABNORMAL /LPF
IMM GRANULOCYTES # BLD AUTO: 0.08 X10(3)/MCL (ref 0–0.04)
IMM GRANULOCYTES NFR BLD AUTO: 1.5 %
KETONES UR QL STRIP.AUTO: NEGATIVE MG/DL
LEUKOCYTE ESTERASE UR QL STRIP.AUTO: NEGATIVE UNIT/L
LYMPHOCYTES # BLD AUTO: 1.47 X10(3)/MCL (ref 0.6–4.6)
LYMPHOCYTES NFR BLD AUTO: 28 %
MCH RBC QN AUTO: 31.7 PG (ref 27–31)
MCHC RBC AUTO-ENTMCNC: 32.4 G/DL (ref 33–36)
MCV RBC AUTO: 97.8 FL (ref 80–94)
MONOCYTES # BLD AUTO: 0.51 X10(3)/MCL (ref 0.1–1.3)
MONOCYTES NFR BLD AUTO: 9.7 %
MUCOUS THREADS URNS QL MICRO: ABNORMAL /LPF
N GONORRHOEA DNA SPEC QL NAA+PROBE: NOT DETECTED
NEUTROPHILS # BLD AUTO: 2.81 X10(3)/MCL (ref 2.1–9.2)
NEUTROPHILS NFR BLD AUTO: 53.6 %
NITRITE UR QL STRIP.AUTO: NEGATIVE
NRBC BLD AUTO-RTO: 0 %
PH UR STRIP.AUTO: 6 [PH]
PLATELET # BLD AUTO: 306 X10(3)/MCL (ref 130–400)
PMV BLD AUTO: 11.2 FL (ref 7.4–10.4)
POTASSIUM SERPL-SCNC: 4.3 MMOL/L (ref 3.5–5.1)
PROT SERPL-MCNC: 8.3 GM/DL (ref 5.8–7.6)
PROT UR QL STRIP.AUTO: ABNORMAL MG/DL
RBC # BLD AUTO: 4.13 X10(6)/MCL (ref 4.7–6.1)
RBC #/AREA URNS AUTO: ABNORMAL /HPF
RBC UR QL AUTO: NEGATIVE UNIT/L
SODIUM SERPL-SCNC: 141 MMOL/L (ref 136–145)
SP GR UR STRIP.AUTO: 1.02
SPERM URNS QL MICRO: ABNORMAL /HPF
SQUAMOUS #/AREA URNS LPF: ABNORMAL /HPF
T PALLIDUM AB SER QL: REACTIVE
UROBILINOGEN UR STRIP-ACNC: NORMAL MG/DL
WBC # SPEC AUTO: 5.25 X10(3)/MCL (ref 4.5–11.5)
WBC #/AREA URNS AUTO: ABNORMAL /HPF

## 2023-06-12 PROCEDURE — 80053 COMPREHEN METABOLIC PANEL: CPT | Performed by: NURSE PRACTITIONER

## 2023-06-12 PROCEDURE — 81001 URINALYSIS AUTO W/SCOPE: CPT | Performed by: NURSE PRACTITIONER

## 2023-06-12 PROCEDURE — 86780 TREPONEMA PALLIDUM: CPT | Performed by: NURSE PRACTITIONER

## 2023-06-12 PROCEDURE — 36415 COLL VENOUS BLD VENIPUNCTURE: CPT | Performed by: NURSE PRACTITIONER

## 2023-06-12 PROCEDURE — 90471 IMMUNIZATION ADMIN: CPT | Mod: PBBFAC

## 2023-06-12 PROCEDURE — 86361 T CELL ABSOLUTE COUNT: CPT | Performed by: NURSE PRACTITIONER

## 2023-06-12 PROCEDURE — 87536 HIV-1 QUANT&REVRSE TRNSCRPJ: CPT | Performed by: NURSE PRACTITIONER

## 2023-06-12 PROCEDURE — 99214 OFFICE O/P EST MOD 30 MIN: CPT | Mod: S$PBB,,, | Performed by: NURSE PRACTITIONER

## 2023-06-12 PROCEDURE — 87491 CHLMYD TRACH DNA AMP PROBE: CPT | Performed by: NURSE PRACTITIONER

## 2023-06-12 PROCEDURE — 99214 OFFICE O/P EST MOD 30 MIN: CPT | Mod: PBBFAC,25 | Performed by: NURSE PRACTITIONER

## 2023-06-12 PROCEDURE — 86480 TB TEST CELL IMMUN MEASURE: CPT | Performed by: NURSE PRACTITIONER

## 2023-06-12 PROCEDURE — 87522 HEPATITIS C REVRS TRNSCRPJ: CPT | Performed by: NURSE PRACTITIONER

## 2023-06-12 PROCEDURE — 82306 VITAMIN D 25 HYDROXY: CPT | Performed by: NURSE PRACTITIONER

## 2023-06-12 PROCEDURE — 87591 N.GONORRHOEAE DNA AMP PROB: CPT | Performed by: NURSE PRACTITIONER

## 2023-06-12 PROCEDURE — 99214 PR OFFICE/OUTPT VISIT, EST, LEVL IV, 30-39 MIN: ICD-10-PCS | Mod: S$PBB,,, | Performed by: NURSE PRACTITIONER

## 2023-06-12 PROCEDURE — 86592 SYPHILIS TEST NON-TREP QUAL: CPT | Performed by: NURSE PRACTITIONER

## 2023-06-12 PROCEDURE — 87340 HEPATITIS B SURFACE AG IA: CPT | Performed by: NURSE PRACTITIONER

## 2023-06-12 PROCEDURE — 85025 COMPLETE CBC W/AUTO DIFF WBC: CPT | Performed by: NURSE PRACTITIONER

## 2023-06-12 PROCEDURE — 90750 HZV VACC RECOMBINANT IM: CPT | Mod: PBBFAC

## 2023-06-12 RX ORDER — TADALAFIL 5 MG/1
5 TABLET ORAL DAILY PRN
Qty: 10 TABLET | Refills: 2 | Status: SHIPPED | OUTPATIENT
Start: 2023-06-12 | End: 2023-09-25 | Stop reason: SDUPTHER

## 2023-06-12 RX ORDER — ACETAMINOPHEN 500 MG
2000 TABLET ORAL DAILY
Qty: 30 CAPSULE | Refills: 3 | Status: SHIPPED | OUTPATIENT
Start: 2023-06-12

## 2023-06-12 RX ORDER — DOLUTEGRAVIR SODIUM 50 MG/1
1 TABLET, FILM COATED ORAL DAILY
Qty: 30 TABLET | Refills: 3 | Status: SHIPPED | OUTPATIENT
Start: 2023-06-12

## 2023-06-12 RX ORDER — EMTRICITABINE AND TENOFOVIR ALAFENAMIDE 200; 25 MG/1; MG/1
1 TABLET ORAL DAILY
Qty: 30 TABLET | Refills: 3 | Status: SHIPPED | OUTPATIENT
Start: 2023-06-12
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
## 2023-06-13 LAB
AGE: 67
CD3+CD4+ CELLS # SPEC: 548.31 UNIT/L (ref 589–1505)
CD3+CD4+ CELLS NFR BLD: 37.3 %
LYMPHOCYTES # BLD AUTO: 1470 X10(3)/MCL (ref 1260–5520)
LYMPHOCYTES NFR LN MANUAL: 28 % (ref 28–48)
LYMPHOMA - T-CELL MARKERS SPEC-IMP: ABNORMAL
RPR SER QL: NORMAL
RPR SER-TITR: NORMAL {TITER}
WBC # BLD AUTO: 5250 /MM3 (ref 4500–11500)

## 2023-06-14 LAB
GAMMA INTERFERON BACKGROUND BLD IA-ACNC: 0.06 IU/ML
HCV RNA SERPL NAA+PROBE-ACNC: NORMAL IU/ML
M TB IFN-G BLD-IMP: NEGATIVE
M TB IFN-G CD4+ BCKGRND COR BLD-ACNC: 0 IU/ML
M TB IFN-G CD4+CD8+ BCKGRND COR BLD-ACNC: 0 IU/ML
MITOGEN IGNF BCKGRD COR BLD-ACNC: 9.94 IU/ML

## 2023-06-15 LAB
C TRACH RRNA SPEC QL NAA+PROBE: NEGATIVE
HIV1 RNA # PLAS NAA DL=20: 32 COPIES/ML
N GONORRHOEA RRNA SPEC QL NAA+PROBE: NEGATIVE
SPECIMEN SOURCE: NORMAL
SPECIMEN SOURCE: NORMAL

## 2023-06-16 LAB — T PALLIDUM AB SER QL: REACTIVE

## 2023-08-23 ENCOUNTER — OFFICE VISIT (OUTPATIENT)
Dept: ORTHOPEDICS | Facility: CLINIC | Age: 68
End: 2023-08-23
Payer: MEDICARE

## 2023-08-23 ENCOUNTER — HOSPITAL ENCOUNTER (OUTPATIENT)
Dept: RADIOLOGY | Facility: CLINIC | Age: 68
Discharge: HOME | End: 2023-08-23
Attending: PHYSICIAN ASSISTANT
Payer: MEDICARE

## 2023-08-23 VITALS
DIASTOLIC BLOOD PRESSURE: 72 MMHG | SYSTOLIC BLOOD PRESSURE: 128 MMHG | HEIGHT: 68 IN | BODY MASS INDEX: 31.52 KG/M2 | WEIGHT: 208 LBS | HEART RATE: 96 BPM

## 2023-08-23 DIAGNOSIS — Z96.642 STATUS POST LEFT HIP REPLACEMENT: ICD-10-CM

## 2023-08-23 DIAGNOSIS — M51.36 LUMBAR DEGENERATIVE DISC DISEASE: ICD-10-CM

## 2023-08-23 DIAGNOSIS — Z96.642 STATUS POST LEFT HIP REPLACEMENT: Primary | ICD-10-CM

## 2023-08-23 PROCEDURE — 73502 X-RAY EXAM HIP UNI 2-3 VIEWS: CPT | Mod: LT,,, | Performed by: PHYSICIAN ASSISTANT

## 2023-08-23 PROCEDURE — 99213 PR OFFICE/OUTPT VISIT, EST, LEVL III, 20-29 MIN: ICD-10-PCS | Mod: ,,, | Performed by: PHYSICIAN ASSISTANT

## 2023-08-23 PROCEDURE — 73502 XR HIP WITH PELVIS WHEN PERFORMED, 2 OR 3 VIEWS LEFT: ICD-10-PCS | Mod: LT,,, | Performed by: PHYSICIAN ASSISTANT

## 2023-08-23 PROCEDURE — 99213 OFFICE O/P EST LOW 20 MIN: CPT | Mod: ,,, | Performed by: PHYSICIAN ASSISTANT

## 2023-08-23 NOTE — PROGRESS NOTES
Chief Complaint:   Chief Complaint   Patient presents with    Left Hip - Follow-up    Follow-up     Pt states he is still experiencing some pain and discomfort when he walks. Pain is bearable, and only aggravtes with movement or when he weightbears on it.       History of present illness:    67-year-old male presents office today for 9 month follow-up on his left hip.  He is 1 year S/P left total hip arthroplasty.  Overall he is doing fairly well with no complaints of groin pain.  He does get back pain that radiates to the lateral hips.  He ambulates with a cane.    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    History of syphilis 05/12/2022    Human immunodeficiency virus (HIV) disease     Hypertension        Past Surgical History:   Procedure Laterality Date    ESOPHAGOGASTRODUODENOSCOPY N/A 06/20/2022    Procedure: EGD (ESOPHAGOGASTRODUODENOSCOPY);  Surgeon: Emile Tang MD;  Location: [facility];  Service: Gastroenterology;  Laterality: N/A;    HIP REPLACEMENT ARTHROPLASTY Right 02/02/2021    HIP SURGERY Left 08/18/2022    ROBOTIC ARTHROPLASTY, HIP Left 08/18/2022    Procedure: ROBOTIC ARTHROPLASTY,HIP;  Surgeon: Bairon Bass MD;  Location: [facility];  Service: Orthopedics;  Laterality: Left;       Current Outpatient Medications   Medication Sig    amLODIPine (NORVASC) 10 MG tablet Take 1 tablet (10 mg total) by mouth once daily.    ammonium lactate 12 % Crea Apply topically 2 (two) times a day. Apply to feet    cholecalciferol, vitamin D3, (VITAMIN D3) 50 mcg (2,000 unit) Cap capsule Take 1 capsule (2,000 Units total) by mouth once daily. For low vitamin D level    dolutegravir (TIVICAY) 50 mg Tab Take 1 tablet (50 mg total) by mouth once daily.    emtricitabine-tenofovir alafen (DESCOVY) 200-25 mg Tab Take 1 tablet by mouth Daily.    levothyroxine (SYNTHROID) 75 MCG tablet Take 1 tablet  "(75 mcg total) by mouth before breakfast. For thyroid    losartan (COZAAR) 50 MG tablet Take 1 tablet (50 mg total) by mouth once daily. For blood pressure    omeprazole (PRILOSEC) 20 MG capsule Take 1 capsule (20 mg total) by mouth once daily. For reflux    tadalafiL (CIALIS) 5 MG tablet Take 1 tablet (5 mg total) by mouth daily as needed for Erectile Dysfunction (PRN sexual activity).    amitriptyline (ELAVIL) 50 MG tablet Take 75 mg by mouth nightly.    aspirin (ECOTRIN) 81 MG EC tablet Take 1 tablet (81 mg total) by mouth 2 (two) times a day. Increase to twice a day for 6 weeks and then restart daily dose on 10/4/22     No current facility-administered medications for this visit.       Review of patient's allergies indicates:  No Known Allergies    Family History   Problem Relation Age of Onset    Diabetes Mother     Diabetes Brother        Social History     Socioeconomic History    Marital status:    Tobacco Use    Smoking status: Every Day     Current packs/day: 0.25     Average packs/day: 0.3 packs/day for 47.6 years (11.9 ttl pk-yrs)     Types: Cigarettes     Start date: 1/1/1976    Smokeless tobacco: Current   Substance and Sexual Activity    Alcohol use: Yes     Alcohol/week: 4.0 standard drinks of alcohol     Types: 4 Cans of beer per week     Comment: Occasionally    Drug use: Yes     Types: "Crack" cocaine     Comment: "very seldom"    Sexual activity: Yes     Partners: Female     Birth control/protection: Condom         Review of Systems:    Constitution:   Denies chills, fever, and sweats.  HENT:   Denies headaches or blurry vision.  Cardiovascular:  Denies chest pain or irregular heart beat.  Respiratory:   Denies cough or shortness of breath.  Gastrointestinal:  Denies abdominal pain, nausea, or vomiting.  Musculoskeletal:   Denies muscle cramps.  Neurological:   Denies dizziness or focal weakness.  Psychiatric/Behavior: Normal mental status.  Hematology/Lymph:  Denies bleeding problem or " "easy bruising/bleeding.  Skin:    Denies rash or suspicious lesions.    Examination:    Vital Signs:    Vitals:    08/23/23 0946 08/23/23 0947   BP:  128/72   Pulse:  96   Weight: 94.3 kg (208 lb) 94.3 kg (208 lb)   Height: 5' 8" (1.727 m) 5' 8" (1.727 m)   PainSc:    5       Body mass index is 31.63 kg/m².    Constitution:   Well-developed, well nourished patient in no acute distress.  Neurological:   Alert and oriented x 3 and cooperative to examination.     Psychiatric/Behavior: Normal mental status.  Respiratory:   No shortness of breath.  Nonlabored breathing  Cardiovascular:           Regular rate and rhythm  Eyes:    Extraoccular muscles intact  Skin:    No scars, rash or suspicious lesions.    Physical Exam:     left Hip     No swelling, induration or tenderness    Well healed scar    No instability of the hip was noted. Motion was normal.     No weakness was observed.    Sensation intact distally    Intact pedal pulses    Complete left hip x-ray with two or more views of the AP and lateral aspects were performed.     Impressions Radiology Test   X-ray of hip was performed showed intact hip prosthesis without signs of loosening or subsidence.  Advanced disc disease seen at L4-5 and L5-S1      Assessment:     Status post left hip replacement  -     X-Ray Hip 2 or 3 views Left (with Pelvis when performed); Future; Expected date: 08/23/2023        Plan:      He will continue with low-impact exercising.  In regards to his back pain that radiating to his lateral hips, I would recommend referral to pain management.  He would like to pursue treatment.  He will follow up with us as needed        No follow-ups on file.    DISCLAIMER: This note may have been dictated using voice recognition software and may contain grammatical errors.     "

## 2023-08-30 DIAGNOSIS — K21.9 GASTROESOPHAGEAL REFLUX DISEASE, UNSPECIFIED WHETHER ESOPHAGITIS PRESENT: ICD-10-CM

## 2023-09-01 RX ORDER — OMEPRAZOLE 20 MG/1
20 CAPSULE, DELAYED RELEASE ORAL DAILY
Qty: 90 CAPSULE | Refills: 1 | Status: SHIPPED | OUTPATIENT
Start: 2023-09-01

## 2023-09-05 ENCOUNTER — TELEPHONE (OUTPATIENT)
Dept: NEPHROLOGY | Facility: CLINIC | Age: 68
End: 2023-09-05
Payer: MEDICARE

## 2023-09-05 DIAGNOSIS — K21.9 GASTROESOPHAGEAL REFLUX DISEASE, UNSPECIFIED WHETHER ESOPHAGITIS PRESENT: ICD-10-CM

## 2023-09-05 RX ORDER — OMEPRAZOLE 20 MG/1
20 CAPSULE, DELAYED RELEASE ORAL DAILY
Qty: 90 CAPSULE | Refills: 1 | OUTPATIENT
Start: 2023-09-05

## 2023-09-05 NOTE — TELEPHONE ENCOUNTER
----- Message from Janay Watkins sent at 9/5/2023  2:58 PM CDT -----  Regarding: Lab Orders  Pt needs lab orders sent to [facility].Pt can be reached at [phone]    Thank You

## 2023-09-05 NOTE — TELEPHONE ENCOUNTER
Pt reminded of appointment will be 9/25 and can do lab draw 1 week before appointment.  States understanding

## 2023-09-25 ENCOUNTER — HOSPITAL ENCOUNTER (OUTPATIENT)
Dept: RADIOLOGY | Facility: HOSPITAL | Age: 68
Discharge: HOME | End: 2023-09-25
Attending: NURSE PRACTITIONER
Payer: MEDICARE

## 2023-09-25 ENCOUNTER — OFFICE VISIT (OUTPATIENT)
Dept: NEPHROLOGY | Facility: CLINIC | Age: 68
End: 2023-09-25
Payer: MEDICARE

## 2023-09-25 VITALS
WEIGHT: 214.38 LBS | OXYGEN SATURATION: 97 % | BODY MASS INDEX: 32.49 KG/M2 | HEIGHT: 68 IN | TEMPERATURE: 98 F | DIASTOLIC BLOOD PRESSURE: 76 MMHG | SYSTOLIC BLOOD PRESSURE: 124 MMHG | HEART RATE: 93 BPM | RESPIRATION RATE: 18 BRPM

## 2023-09-25 DIAGNOSIS — M25.562 ACUTE PAIN OF LEFT KNEE: ICD-10-CM

## 2023-09-25 DIAGNOSIS — Z23 FLU VACCINE NEED: ICD-10-CM

## 2023-09-25 DIAGNOSIS — N18.32 CKD STAGE G3B/A1, GFR 30-44 AND ALBUMIN CREATININE RATIO <30 MG/G: Primary | ICD-10-CM

## 2023-09-25 DIAGNOSIS — N52.9 ERECTILE DYSFUNCTION, UNSPECIFIED ERECTILE DYSFUNCTION TYPE: ICD-10-CM

## 2023-09-25 PROCEDURE — 73562 X-RAY EXAM OF KNEE 3: CPT | Mod: TC,LT

## 2023-09-25 PROCEDURE — G0008 ADMIN INFLUENZA VIRUS VAC: HCPCS | Mod: PBBFAC

## 2023-09-25 PROCEDURE — 99214 PR OFFICE/OUTPT VISIT, EST, LEVL IV, 30-39 MIN: ICD-10-PCS | Mod: S$PBB,,, | Performed by: NURSE PRACTITIONER

## 2023-09-25 PROCEDURE — 99215 OFFICE O/P EST HI 40 MIN: CPT | Mod: PBBFAC,25 | Performed by: NURSE PRACTITIONER

## 2023-09-25 PROCEDURE — 99214 OFFICE O/P EST MOD 30 MIN: CPT | Mod: S$PBB,,, | Performed by: NURSE PRACTITIONER

## 2023-09-25 RX ORDER — TADALAFIL 5 MG/1
5 TABLET ORAL DAILY PRN
Qty: 10 TABLET | Refills: 2 | Status: SHIPPED | OUTPATIENT
Start: 2023-09-25 | End: 2024-01-25 | Stop reason: SDUPTHER

## 2023-09-26 ENCOUNTER — TELEPHONE (OUTPATIENT)
Dept: NEPHROLOGY | Facility: CLINIC | Age: 68
End: 2023-09-26
Payer: MEDICARE

## 2023-09-26 NOTE — TELEPHONE ENCOUNTER
Please let the patient know that I reviewed Xray results, there are no fractures or dislocation, patient does have signs of arthritis.  Continue cold compresses and Gentle stretching exercises.    Spoke with sister:  states understanding and will relay the message

## 2023-09-26 NOTE — PROGRESS NOTES
Please let the patient know that I reviewed Xray results, there are no fractures or dislocation, patient does have signs of arthritis.  Continue cold compresses and Gentle stretching exercises.

## 2023-09-27 NOTE — PROGRESS NOTES
"[facility]  Nephrology Clinic Note    Chief complaint: Management of CKD    History of present illness:   Renetta Glover Jr. is a 67 y.o. Other male with past medical history of chronic kidney disease, HIV, treated hepatitis C, syphilis, arthritis, nephrolithiasis, past history of polysubstance abuse, and hypertension. Presents for follow-up appointment in nephrology clinic. C/o left knee pain after a fall 2 days ago.    Review of Systems  12 point review of systems conducted, negative except as stated in the history of present illness.    Allergies: Patient has No Known Allergies.     Past Medical History:  has a past medical history of Disorder of kidney and ureter, GERD (gastroesophageal reflux disease), History of cryptococcal meningitis, History of syphilis, Human immunodeficiency virus (HIV) disease, and Hypertension.    Procedure History:  has a past surgical history that includes Hip replacement arthroplasty (Right, 02/02/2021); Esophagogastroduodenoscopy (N/A, 06/20/2022); robotic arthroplasty, hip (Left, 08/18/2022); and Hip surgery (Left, 08/18/2022).    Family History: family history includes Diabetes in his brother and mother.    Social History:  reports that he has been smoking cigarettes. He started smoking about 47 years ago. He has a 11.9 pack-year smoking history. He uses smokeless tobacco. He reports current alcohol use of about 4.0 standard drinks of alcohol per week. He reports current drug use. Drug: "Crack" cocaine.    Physical exam  /76 (BP Location: Right arm, Patient Position: Sitting, BP Method: Large (Automatic))   Pulse 93   Temp 98.1 °F (36.7 °C) (Oral)   Resp 18   Ht 5' 8" (1.727 m)   Wt 97.3 kg (214 lb 6.4 oz)   SpO2 97%   BMI 32.60 kg/m²   General appearance: Patient is in no acute distress.  Skin: No rashes or wounds.  HEENT: PERRLA, EOMI, no scleral icterus, no JVD. Neck is supple.  Chest: Respirations are unlabored. Lungs sounds are " clear.   Heart: S1, S2.   Abdomen: Benign.  : Deferred.  Extremities: No edema, peripheral pulses are palpable.   Neuro: No focal deficits.     Home Medications:    Current Outpatient Medications:     amitriptyline (ELAVIL) 50 MG tablet, Take 75 mg by mouth nightly., Disp: , Rfl:     amLODIPine (NORVASC) 10 MG tablet, Take 1 tablet (10 mg total) by mouth once daily., Disp: 90 tablet, Rfl: 1    cholecalciferol, vitamin D3, (VITAMIN D3) 50 mcg (2,000 unit) Cap capsule, Take 1 capsule (2,000 Units total) by mouth once daily. For low vitamin D level, Disp: 30 capsule, Rfl: 3    dolutegravir (TIVICAY) 50 mg Tab, Take 1 tablet (50 mg total) by mouth once daily., Disp: 30 tablet, Rfl: 3    emtricitabine-tenofovir alafen (DESCOVY) 200-25 mg Tab, Take 1 tablet by mouth Daily., Disp: 30 tablet, Rfl: 3    levothyroxine (SYNTHROID) 75 MCG tablet, Take 1 tablet (75 mcg total) by mouth before breakfast. For thyroid, Disp: 90 tablet, Rfl: 0    losartan (COZAAR) 50 MG tablet, Take 1 tablet (50 mg total) by mouth once daily. For blood pressure, Disp: 90 tablet, Rfl: 1    omeprazole (PRILOSEC) 20 MG capsule, Take 1 capsule (20 mg total) by mouth once daily. For reflux, Disp: 90 capsule, Rfl: 1    ammonium lactate 12 % Crea, Apply topically 2 (two) times a day. Apply to feet, Disp: , Rfl:     aspirin (ECOTRIN) 81 MG EC tablet, Take 1 tablet (81 mg total) by mouth 2 (two) times a day. Increase to twice a day for 6 weeks and then restart daily dose on 10/4/22, Disp: 84 tablet, Rfl: 0    tadalafiL (CIALIS) 5 MG tablet, Take 1 tablet (5 mg total) by mouth daily as needed for Erectile Dysfunction (PRN sexual activity)., Disp: 10 tablet, Rfl: 2    Laboratory data    Serum  Lab Results   Component Value Date    WBC 5.25 06/12/2023    HGB 13.1 (L) 06/12/2023    HCT 40.4 (L) 06/12/2023     06/12/2023    IRON 75 03/28/2019    TIBC 328 03/28/2019    TRANS 263.0 03/28/2019    LABIRON 22.9 03/28/2019    FERRITIN 539.6 (H) 07/13/2017     FOLATE 17.9 (H) 07/12/2018    EDZEMDSD50 261 07/12/2018     07/24/2017     06/12/2023    K 4.3 06/12/2023    CHLORIDE 108 (H) 06/12/2023    CO2 25 06/12/2023    BUN 18.6 06/12/2023    CREATININE 1.94 (H) 06/12/2023    EGFRNORACEVR 37 06/12/2023    GLUCOSE 96 06/12/2023    CALCIUM 9.3 06/12/2023    ALKPHOS 96 06/12/2023    LABPROT 8.3 (H) 06/12/2023    ALBUMIN 3.9 06/12/2023    BILIDIR 0.1 01/20/2022    IBILI 0.10 01/20/2022    AST 32 06/12/2023    ALT 35 06/12/2023    MG 1.9 08/04/2017    PHOS 2.7 08/04/2017      Lab Results   Component Value Date    HGBA1C 5.6 01/15/2021    MVGXDTYV08JX 34.4 06/12/2023    HIV Reactive (A) 09/05/2019    HEPCAB Reactive (A) 08/05/2021    HEPBSURFAG Nonreactive 06/12/2023    HEPBSAB Reactive (A) 08/15/2022     Urine:  Lab Results   Component Value Date    COLORUA Light-Yellow 06/12/2023    APPEARANCEUA Clear 06/12/2023    SGUA 1.016 06/12/2023    PHUA 6.0 06/12/2023    PROTEINUA Trace (A) 06/12/2023    GLUCOSEUA Normal 06/12/2023    KETONESUA Negative 06/12/2023    BLOODUA Negative 06/12/2023    NITRITESUA Negative 06/12/2023    LEUKOCYTESUR Negative 06/12/2023    RBCUA 0-5 06/12/2023    WBCUA 0-5 06/12/2023    BACTERIA Trace (A) 06/12/2023    SQEPUA None Seen 06/12/2023    HYALINECASTS 6-10 (A) 06/12/2023    CREATRANDUR 233.9 (H) 03/23/2023    PROTEINURINE 34.4 03/23/2023    UPROTCREA 0.1 03/23/2023         Imaging  CT of abdomen and pelvis with and without contrast 07/14/2017:  ADRENALS: No adrenal nodules.  KIDNEYS/URETERS: Evaluation for radiodense stones limited by contrast  in the bilateral collecting systems. No hydronephrosis or solid renal  masses.    Impression and plan     CKD stage G3b/A1, GFR 30-44 and albumin creatinine ratio <30 mg/g  -     Comprehensive Metabolic Panel; Future; Expected date: 03/15/2024  -     Protein/Creatinine Ratio, Urine; Future; Expected date: 03/15/2024  -     Urinalysis, Reflex to Urine Culture; Future; Expected date:  03/15/2024  Likely hypertensive kidney disease.  Renal indices are stable.  Continue risk factor management and periodic monitoring.    Continue:  -follow 2 g a day dietary sodium restriction  -control high blood pressure (goal blood pressure is less than 130/80, please check blood pressure twice a week and bring blood pressure logs to office visit)  -exercise at least 30 minutes a day, 5 days a week  -maintain healthy weight  -decrease or stop alcohol use  -do not smoke  -stay well hydrated (drink water only, avoid juices, sweet tea, and sodas)  -ask about staying up-to-date on vaccinations (flu vaccine, pneumonia vaccine, hepatitis B vaccine)  -avoid excessive use of NSAIDs (ibuprofen, naproxen, Aleve, Advil, Toradol, Mobic), take Tylenol as needed for headache or mild pain  -take cholesterol lowering medications if prescribed (LDL goal less than 100)  Follow up in about 6 months (around 3/25/2024).    Acute pain of left knee  -     X-Ray Knee 3 View Left; Future; Expected date: 09/25/2023  Will order X ray of left knee, follow up with results by phone.     Erectile dysfunction, unspecified erectile dysfunction type  -     tadalafiL (CIALIS) 5 MG tablet; Take 1 tablet (5 mg total) by mouth daily as needed for Erectile Dysfunction (PRN sexual activity).  Dispense: 10 tablet; Refill: 2  Continue Tadalafil.     Flu vaccine need  -     Influenza (FLUAD) - Quadrivalent (Adjuvanted) *Preferred* (65+) (PF)  Will give flu vaccine today.       09/25/2023  Alison Lemon NP  [facility]

## 2023-11-09 ENCOUNTER — OFFICE VISIT (OUTPATIENT)
Dept: PAIN MEDICINE | Facility: CLINIC | Age: 68
End: 2023-11-09
Payer: MEDICARE

## 2023-11-09 VITALS
DIASTOLIC BLOOD PRESSURE: 89 MMHG | WEIGHT: 214 LBS | SYSTOLIC BLOOD PRESSURE: 147 MMHG | HEIGHT: 68 IN | HEART RATE: 106 BPM | TEMPERATURE: 98 F | BODY MASS INDEX: 32.43 KG/M2

## 2023-11-09 DIAGNOSIS — M51.36 DDD (DEGENERATIVE DISC DISEASE), LUMBAR: Primary | ICD-10-CM

## 2023-11-09 DIAGNOSIS — M54.16 LUMBAR RADICULITIS: ICD-10-CM

## 2023-11-09 PROCEDURE — 99204 PR OFFICE/OUTPT VISIT, NEW, LEVL IV, 45-59 MIN: ICD-10-PCS | Mod: ,,, | Performed by: NURSE PRACTITIONER

## 2023-11-09 PROCEDURE — 99204 OFFICE O/P NEW MOD 45 MIN: CPT | Mod: ,,, | Performed by: NURSE PRACTITIONER

## 2023-11-09 RX ORDER — METHYLPREDNISOLONE 4 MG/1
TABLET ORAL
Qty: 21 EACH | Refills: 0 | Status: SHIPPED | OUTPATIENT
Start: 2023-11-09 | End: 2023-11-30

## 2023-11-09 NOTE — PROGRESS NOTES
Hpi Title: Evaluation of Skin Lesions Subjective:      Patient ID: Renetta Glover Jr. is a 68 y.o. male.    Chief Complaint: Back Pain (Referred by Jose Rafael CUNNINGHAM, no prior injury or surgery, walking with 1 crutch, takes nothing for relief, bilateral hip replacement, no P.T for lumbar, pt states he fell out bed trying to change position about 1 week ago, pain level 7/10)    Referred by: No ref. provider found     HPI a pleasant 68-year-old  male patient presenting as a new consult for low back pain from lumbar DDD with radiculitis. He is 1 year S/P left total hip arthroplasty.  He does get back pain that radiates to the lateral hips.  He ambulates with a cane.  Is accompanied with his friend during the visit.  He ambulates with a single crutch to his right arm.  He stated his back and leg pain started after his left last hip replacement.  Currently his pain is located to bilateral low back that radiates to bilateral buttocks and down bilateral legs with the left leg sciatica more intense than the right.  Both legs have pain in the same locations that radiates from the buttocks to the posterior region just past the knee (possible S1 region) and to the anterior lateral upper thigh around what sounds like a possible L3 region.    Pain will elevate to a 10/10 with prolonged sitting or standing, he is unfortunately unable to complete household chores or yd work or even go grocery shopping without intense pain.  This also wakes him up at night.  In the past he has tried taking tramadol, Tylenol, prior muscle relaxers and Norco with nothing really providing sustainable pain relief.  When he stops the activity temporarily this will reduce his pain by about 50%.  Patient has not completed physical therapy for this pain in his not sure who is in his network at this time.  Review of his medical record shows pertinent past medical history of disorder of kidney and ureter and HIV.  Pertinent past surgical history includes left hip surgery with  "bilateral total hip replacement.  He has no spinal cord stimulator, cardiac defibrillator or pacemaker and no prior spinal surgeries.        Vital signs:   Vitals:    11/09/23 1335   BP: (!) 147/89   Pulse: 106   Temp: 98 °F (36.7 °C)   TempSrc: Oral   Weight: 97.1 kg (214 lb)   Height: 5' 8" (1.727 m)   PainSc:   7     Body mass index is 32.54 kg/m².  Pain Disability Index (PDI): 59       Interventional Pain History  No ESIs or back surgery    ROS: Low back pain and leg pain left greater than the right.      MRI lumbar spine:  None on file in EMR or   Objective:          Physical Exam  General: Well developed; normal weight.  A&O x 3; No anxiety/depression; NAD  Mental Status: Oriented to person, palce and time. Displays appropriate mood & affect.  Head: Norm cephalic and atraumatic  Neck:  No cervical paraspinal banding.  Full range of motion with lateral turning and cervical flexion +extension.  Eyes: normal conjunctiva, normal lids, normal pupils  ENT and mouth: normal external ear, nose, and no lesions noted on the lips.  Respiratory: Symmetrical, Unlabored. No dyspnea  CV: normal rhythm and rate. No peripheral edema.   Abdomen: Non-distended    Extremities:  Gen: No cyanosis or tenderness to palpation bilateral upper and lower extremities  Skin: Warm, pink, dry, no rashes, no lesions on the lumbar spine  Strength: 5/5 motor strength bilateral upper and lower extremities  ROM: Full ROM in bilateral knees and without pain or instability.    Neuro:  Gait:  Antalgic gait ambulating with a single crutch under right arm. normal toe and heel raise. Independent ambulator.  DTR's: 2+ in bilateral patellar  Sensory: numbness to light touch bilateral  upper and lower extremities    Spine:  No scoliosis  L-spine Notable lumbar axial pain w/ lateral twiting at waist, extension and no pain with flexion. Also had pain to posgterior low back and buttock bilaterally palpating bilateral L3 + bilataral " S1    Straight Leg Raise:  Positive right, positive left  SI Joint: No tenderness to palpation bilaterally.            Assessment:     Pt to call back with name of PT group to place order for lumbar radiculopathy with sciatica eval and treat 6-8 weeds 2-3 times a week. Patient to call back with approved PT group and I will place order.     Notable lumbar axial pain w/ lateral twisting at waist, extension and no pain with flexion. Also had pain to posterior low back and buttock bilaterally palpating bilateral L3 + bilateral S1    Suspect disc bulge and possible foraminal narrowing around L3 and S1.   No imaging    RX medrol dose pack in interim    Follow up in 8 weeks after PT for bilateral low back and bilateral sciatica.             Encounter Diagnoses   Name Primary?    DDD (degenerative disc disease), lumbar Yes    Lumbar radiculitis          Plan:       Renetta was seen today for back pain.    Diagnoses and all orders for this visit:    DDD (degenerative disc disease), lumbar    Lumbar radiculitis               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    History of syphilis 05/12/2022    Human immunodeficiency virus (HIV) disease     Hypertension        Past Surgical History:   Procedure Laterality Date    ESOPHAGOGASTRODUODENOSCOPY N/A 06/20/2022    Procedure: EGD (ESOPHAGOGASTRODUODENOSCOPY);  Surgeon: Emile Tang MD;  Location: [facility];  Service: Gastroenterology;  Laterality: N/A;    HIP REPLACEMENT ARTHROPLASTY Right 02/02/2021    HIP SURGERY Left 08/18/2022    ROBOTIC ARTHROPLASTY, HIP Left 08/18/2022    Procedure: ROBOTIC ARTHROPLASTY,HIP;  Surgeon: Bairon Bass MD;  Location: [facility] OR;  Service: Orthopedics;  Laterality: Left;       Family History   Problem Relation Age of Onset    Diabetes Mother     Diabetes Brother        Social History     Socioeconomic History    Marital  "status:    Tobacco Use    Smoking status: Every Day     Current packs/day: 0.25     Average packs/day: 0.2 packs/day for 47.9 years (12.0 ttl pk-yrs)     Types: Cigarettes     Start date: 1/1/1976    Smokeless tobacco: Current   Substance and Sexual Activity    Alcohol use: Yes     Alcohol/week: 4.0 standard drinks of alcohol     Types: 4 Cans of beer per week     Comment: Occasionally    Drug use: Yes     Types: "Crack" cocaine     Comment: "very seldom"    Sexual activity: Yes     Partners: Female     Birth control/protection: Condom       Current Outpatient Medications   Medication Sig Dispense Refill    amitriptyline (ELAVIL) 50 MG tablet Take 75 mg by mouth nightly.      amLODIPine (NORVASC) 10 MG tablet Take 1 tablet (10 mg total) by mouth once daily. 90 tablet 1    cholecalciferol, vitamin D3, (VITAMIN D3) 50 mcg (2,000 unit) Cap capsule Take 1 capsule (2,000 Units total) by mouth once daily. For low vitamin D level 30 capsule 3    dolutegravir (TIVICAY) 50 mg Tab Take 1 tablet (50 mg total) by mouth once daily. 30 tablet 3    emtricitabine-tenofovir alafen (DESCOVY) 200-25 mg Tab Take 1 tablet by mouth Daily. 30 tablet 3    levothyroxine (SYNTHROID) 75 MCG tablet Take 1 tablet (75 mcg total) by mouth before breakfast. For thyroid 90 tablet 0    losartan (COZAAR) 50 MG tablet Take 1 tablet (50 mg total) by mouth once daily. For blood pressure 90 tablet 1    omeprazole (PRILOSEC) 20 MG capsule Take 1 capsule (20 mg total) by mouth once daily. For reflux 90 capsule 1    tadalafiL (CIALIS) 5 MG tablet Take 1 tablet (5 mg total) by mouth daily as needed for Erectile Dysfunction (PRN sexual activity). 10 tablet 2    ammonium lactate 12 % Crea Apply topically 2 (two) times a day. Apply to feet      aspirin (ECOTRIN) 81 MG EC tablet Take 1 tablet (81 mg total) by mouth 2 (two) times a day. Increase to twice a day for 6 weeks and then restart daily dose on 10/4/22 84 tablet 0     No current " facility-administered medications for this visit.       Review of patient's allergies indicates:  No Known Allergies

## 2023-11-13 ENCOUNTER — TELEPHONE (OUTPATIENT)
Dept: PAIN MEDICINE | Facility: CLINIC | Age: 68
End: 2023-11-13
Payer: MEDICARE

## 2023-11-13 NOTE — TELEPHONE ENCOUNTER
----- Message from Nancy Hernández sent at 11/13/2023  9:32 AM CST -----  Regarding: PT  Mr. Jackson's sister Malena phoned with the name of Physical therapy he would like to attend.  He want to go to [facility], [facility] [id number]. [phone], [phone].

## 2024-01-04 ENCOUNTER — OFFICE VISIT (OUTPATIENT)
Dept: PAIN MEDICINE | Facility: CLINIC | Age: 69
End: 2024-01-04
Payer: MEDICARE

## 2024-01-04 VITALS
HEIGHT: 68 IN | HEART RATE: 108 BPM | DIASTOLIC BLOOD PRESSURE: 66 MMHG | TEMPERATURE: 98 F | WEIGHT: 214 LBS | SYSTOLIC BLOOD PRESSURE: 112 MMHG | BODY MASS INDEX: 32.43 KG/M2

## 2024-01-04 DIAGNOSIS — G89.4 CHRONIC PAIN SYNDROME: Primary | ICD-10-CM

## 2024-01-04 DIAGNOSIS — M54.32 BILATERAL SCIATICA: ICD-10-CM

## 2024-01-04 DIAGNOSIS — M54.9 DORSALGIA, UNSPECIFIED: ICD-10-CM

## 2024-01-04 DIAGNOSIS — M54.31 BILATERAL SCIATICA: ICD-10-CM

## 2024-01-04 PROCEDURE — 1125F AMNT PAIN NOTED PAIN PRSNT: CPT | Mod: CPTII,,, | Performed by: NURSE PRACTITIONER

## 2024-01-04 PROCEDURE — 1101F PT FALLS ASSESS-DOCD LE1/YR: CPT | Mod: CPTII,,, | Performed by: NURSE PRACTITIONER

## 2024-01-04 PROCEDURE — 99214 OFFICE O/P EST MOD 30 MIN: CPT | Mod: ,,, | Performed by: NURSE PRACTITIONER

## 2024-01-04 PROCEDURE — 3074F SYST BP LT 130 MM HG: CPT | Mod: CPTII,,, | Performed by: NURSE PRACTITIONER

## 2024-01-04 PROCEDURE — 3008F BODY MASS INDEX DOCD: CPT | Mod: CPTII,,, | Performed by: NURSE PRACTITIONER

## 2024-01-04 PROCEDURE — 3288F FALL RISK ASSESSMENT DOCD: CPT | Mod: CPTII,,, | Performed by: NURSE PRACTITIONER

## 2024-01-04 PROCEDURE — 3078F DIAST BP <80 MM HG: CPT | Mod: CPTII,,, | Performed by: NURSE PRACTITIONER

## 2024-01-04 NOTE — PROGRESS NOTES
Subjective:      Patient ID: Renetta Glover Jr. is a 68 y.o. male.    Chief Complaint: Low-back Pain (8 week f/u low back pain and sciatica, attending P.T which has not provided relief, walking with cane, medrol dose pack did not provide any relief, takes nothing for relief, pain level 7/10)    Referred by: No ref. provider found     HPI this is a pleasant 68-year-old male patient presenting as a 8 week follow-up for low back with sciatica after completing physical therapy for 6-8 weeks. , patient attended physical therapy consistently without relief.  He also states the Medrol Dosepak did not prior by him any relief either right now he has not taking anything for pain.  His original consult was for pain associated with lumbar degenerative disc disease and radiculitis.    Notable lumbar axial pain w/ lateral twisting at waist, extension and no pain with flexion. Also had pain to posterior low back and buttock bilaterally palpating bilateral L3 + bilateral S1     Suspect disc bulge and possible foraminal narrowing around L3 and S1 during LOV, Today was tender palpating posteriorly to L3 and L4 region.   No imaging    He is 1 year S/P left total hip arthroplasty.  He does get back pain that radiates to the lateral hips.  He ambulates with a cane.  Is accompanied with his friend during the visit.  He ambulates with a single crutch to his right arm.  He stated his back and leg pain started after his left last hip replacement.  Currently his pain is located to bilateral low back that radiates to bilateral buttocks and down bilateral legs with the left leg sciatica more intense than the right.  Both legs have pain in the same locations that radiates from the buttocks to the posterior region just past the knee (possible S1 region) and to the anterior lateral upper thigh around what sounds like a possible L3 region.     Pain will elevate to a 10/10 with prolonged sitting or standing, he is unfortunately unable to complete  "household chores or yd work or even go grocery shopping without intense pain.  This also wakes him up at night.  In the past he has tried taking tramadol, Tylenol, prior muscle relaxers and Norco with nothing really providing sustainable pain relief.  When he stops the activity temporarily this will reduce his pain by about 50%.  Patient has not completed physical therapy for this pain in his not sure who is in his network at this time.      Review of his medical record shows pertinent past medical history of disorder of kidney and ureter and HIV.      Pertinent past surgical history includes left hip surgery with bilateral total hip replacement.      He has no spinal cord stimulator, cardiac defibrillator or pacemaker and no prior spinal surgeries.    Vital signs:   Vitals:    01/04/24 1344   BP: 112/66   Pulse: 108   Temp: 97.9 °F (36.6 °C)   TempSrc: Oral   Weight: 97.1 kg (214 lb)   Height: 5' 8" (1.727 m)   PainSc:   7     Body mass index is 32.54 kg/m².  Pain Disability Index (PDI): 60       Interventional Pain History  No ESIs or back surgery     ROS: Low back pain and leg pain left greater than the right.     MRI lumbar spine:  None on file in EMR or         Objective:          Physical Exam  General: Well developed; overweight.  A&O x 3; No anxiety/depression; NAD  Mental Status: Oriented to person, palce and time. Displays appropriate mood & affect.  Head: Norm cephalic and atraumatic  Neck:  No cervical paraspinal banding.  Full range of motion with lateral turning and cervical flexion +extension.  Eyes: normal conjunctiva, normal lids, normal pupils  ENT and mouth: normal external ear, nose, and no lesions noted on the lips.  Respiratory: Symmetrical, Unlabored. No dyspnea  CV: normal rhythm and rate. No peripheral edema.   Abdomen: Non-distended     Extremities:  Gen: No cyanosis or tenderness to palpation bilateral upper and lower extremities  Skin: Warm, pink, dry, no rashes, no lesions " on the lumbar spine  Strength: 5/5 motor strength bilateral upper and lower extremities  ROM: Full ROM in bilateral knees and without pain or instability.     Neuro:  Gait:  Antalgic gait ambulating with a single crutch under right arm. normal toe and heel raise. Independent ambulator.  DTR's: 2+ in bilateral patellar  Sensory: numbness to light touch bilateral  upper and lower extremities     Spine:  No scoliosis  L-spine Notable lumbar axial pain w/ lateral twiting at waist, extension and no pain with flexion. Also had pain to posgterior low back and buttock bilaterally palpating bilateral L3 + L4 and bilataral S1     Straight Leg Raise:  Positive bilaterally   SI Joint: No tenderness to palpation bilaterally.      Assessment:     PT Ineffective. Continues having low back pain with bilateral sciatica and intense axial medial pain to lumbar spine. This  has negativity impacted his day to day activities and quality of life.    MRI L Spine [facility] ordered  Suspect facet arthropathy and lumbar DDD with possible foraminal narrowing.   Painful areas today around L3 + L4  and S1 dermatome regions     Follow up in 3 weeks to go over results and plan  of care.     Encounter Diagnoses   Name Primary?    Chronic pain syndrome Yes    Bilateral sciatica          Plan:       Renetta was seen today for low-back pain.    Diagnoses and all orders for this visit:    Chronic pain syndrome    Bilateral sciatica               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    History of syphilis 05/12/2022    Human immunodeficiency virus (HIV) disease     Hypertension        Past Surgical History:   Procedure Laterality Date    ESOPHAGOGASTRODUODENOSCOPY N/A 06/20/2022    Procedure: EGD (ESOPHAGOGASTRODUODENOSCOPY);  Surgeon: Emile Tang MD;  Location[facility];  Service: Gastroenterology;  Laterality:  "N/A;    HIP REPLACEMENT ARTHROPLASTY Right 02/02/2021    HIP SURGERY Left 08/18/2022    ROBOTIC ARTHROPLASTY, HIP Left 08/18/2022    Procedure: ROBOTIC ARTHROPLASTY,HIP;  Surgeon: Bairon Bass MD;  Location: [facility];  Service: Orthopedics;  Laterality: Left;       Family History   Problem Relation Age of Onset    Diabetes Mother     Diabetes Brother        Social History     Socioeconomic History    Marital status:    Tobacco Use    Smoking status: Every Day     Current packs/day: 0.25     Average packs/day: 0.3 packs/day for 48.0 years (12.0 ttl pk-yrs)     Types: Cigarettes     Start date: 1/1/1976    Smokeless tobacco: Current   Substance and Sexual Activity    Alcohol use: Yes     Alcohol/week: 4.0 standard drinks of alcohol     Types: 4 Cans of beer per week     Comment: Occasionally    Drug use: Yes     Types: "Crack" cocaine     Comment: "very seldom"    Sexual activity: Yes     Partners: Female     Birth control/protection: Condom       Current Outpatient Medications   Medication Sig Dispense Refill    amitriptyline (ELAVIL) 50 MG tablet Take 75 mg by mouth nightly.      amLODIPine (NORVASC) 10 MG tablet Take 1 tablet (10 mg total) by mouth once daily. 90 tablet 1    ammonium lactate 12 % Crea Apply topically 2 (two) times a day. Apply to feet      aspirin (ECOTRIN) 81 MG EC tablet Take 1 tablet (81 mg total) by mouth 2 (two) times a day. Increase to twice a day for 6 weeks and then restart daily dose on 10/4/22 84 tablet 0    cholecalciferol, vitamin D3, (VITAMIN D3) 50 mcg (2,000 unit) Cap capsule Take 1 capsule (2,000 Units total) by mouth once daily. For low vitamin D level 30 capsule 3    dolutegravir (TIVICAY) 50 mg Tab Take 1 tablet (50 mg total) by mouth once daily. 30 tablet 3    emtricitabine-tenofovir alafen (DESCOVY) 200-25 mg Tab Take 1 tablet by mouth Daily. 30 tablet 3    levothyroxine (SYNTHROID) 75 MCG tablet Take 1 tablet (75 mcg total) by mouth before breakfast. For thyroid 90 " tablet 0    losartan (COZAAR) 50 MG tablet Take 1 tablet (50 mg total) by mouth once daily. For blood pressure 90 tablet 1    omeprazole (PRILOSEC) 20 MG capsule Take 1 capsule (20 mg total) by mouth once daily. For reflux 90 capsule 1    tadalafiL (CIALIS) 5 MG tablet Take 1 tablet (5 mg total) by mouth daily as needed for Erectile Dysfunction (PRN sexual activity). 10 tablet 2     No current facility-administered medications for this visit.       Review of patient's allergies indicates:  No Known Allergies

## 2024-01-25 ENCOUNTER — OFFICE VISIT (OUTPATIENT)
Dept: INFECTIOUS DISEASES | Facility: CLINIC | Age: 69
End: 2024-01-25
Payer: MEDICARE

## 2024-01-25 ENCOUNTER — OFFICE VISIT (OUTPATIENT)
Dept: PAIN MEDICINE | Facility: CLINIC | Age: 69
End: 2024-01-25
Payer: MEDICARE

## 2024-01-25 VITALS
SYSTOLIC BLOOD PRESSURE: 112 MMHG | WEIGHT: 211 LBS | DIASTOLIC BLOOD PRESSURE: 84 MMHG | BODY MASS INDEX: 31.98 KG/M2 | HEIGHT: 68 IN | HEART RATE: 104 BPM

## 2024-01-25 VITALS
DIASTOLIC BLOOD PRESSURE: 78 MMHG | WEIGHT: 211 LBS | SYSTOLIC BLOOD PRESSURE: 136 MMHG | HEIGHT: 68 IN | RESPIRATION RATE: 16 BRPM | TEMPERATURE: 98 F | BODY MASS INDEX: 31.98 KG/M2 | HEART RATE: 108 BPM

## 2024-01-25 DIAGNOSIS — Z86.19 HEPATITIS C VIRUS INFECTION CURED AFTER ANTIVIRAL DRUG THERAPY: ICD-10-CM

## 2024-01-25 DIAGNOSIS — M54.31 BILATERAL SCIATICA: Primary | ICD-10-CM

## 2024-01-25 DIAGNOSIS — M54.16 LUMBAR RADICULOPATHY: ICD-10-CM

## 2024-01-25 DIAGNOSIS — M48.061 LUMBAR FORAMINAL STENOSIS: ICD-10-CM

## 2024-01-25 DIAGNOSIS — M54.32 BILATERAL SCIATICA: Primary | ICD-10-CM

## 2024-01-25 DIAGNOSIS — M81.0 AGE-RELATED OSTEOPOROSIS WITHOUT CURRENT PATHOLOGICAL FRACTURE: ICD-10-CM

## 2024-01-25 DIAGNOSIS — M51.36 DDD (DEGENERATIVE DISC DISEASE), LUMBAR: ICD-10-CM

## 2024-01-25 DIAGNOSIS — N52.9 ERECTILE DYSFUNCTION, UNSPECIFIED ERECTILE DYSFUNCTION TYPE: ICD-10-CM

## 2024-01-25 DIAGNOSIS — M48.062 SPINAL STENOSIS OF LUMBAR REGION WITH NEUROGENIC CLAUDICATION: ICD-10-CM

## 2024-01-25 DIAGNOSIS — Z86.19 HISTORY OF CRYPTOCOCCAL MENINGITIS: ICD-10-CM

## 2024-01-25 DIAGNOSIS — Z21 HIV POSITIVE: Primary | ICD-10-CM

## 2024-01-25 DIAGNOSIS — Z86.19 HISTORY OF SYPHILIS: ICD-10-CM

## 2024-01-25 DIAGNOSIS — N18.32 STAGE 3B CHRONIC KIDNEY DISEASE: ICD-10-CM

## 2024-01-25 DIAGNOSIS — E55.9 VITAMIN D DEFICIENCY: ICD-10-CM

## 2024-01-25 LAB
ALBUMIN SERPL-MCNC: 3.8 G/DL (ref 3.4–4.8)
ALBUMIN/GLOB SERPL: 0.9 RATIO (ref 1.1–2)
ALP SERPL-CCNC: 87 UNIT/L (ref 40–150)
ALT SERPL-CCNC: 28 UNIT/L (ref 0–55)
APPEARANCE UR: CLEAR
AST SERPL-CCNC: 21 UNIT/L (ref 5–34)
BACTERIA #/AREA URNS AUTO: ABNORMAL /HPF
BASOPHILS # BLD AUTO: 0.07 X10(3)/MCL
BASOPHILS NFR BLD AUTO: 1.2 %
BILIRUB SERPL-MCNC: 0.3 MG/DL
BILIRUB UR QL STRIP.AUTO: NEGATIVE
BUN SERPL-MCNC: 18.4 MG/DL (ref 8.4–25.7)
C TRACH DNA SPEC QL NAA+PROBE: NOT DETECTED
CALCIUM SERPL-MCNC: 9.1 MG/DL (ref 8.8–10)
CHLORIDE SERPL-SCNC: 108 MMOL/L (ref 98–107)
CO2 SERPL-SCNC: 24 MMOL/L (ref 23–31)
COLOR UR AUTO: YELLOW
CREAT SERPL-MCNC: 2.62 MG/DL (ref 0.73–1.18)
EOSINOPHIL # BLD AUTO: 0.33 X10(3)/MCL (ref 0–0.9)
EOSINOPHIL NFR BLD AUTO: 5.5 %
ERYTHROCYTE [DISTWIDTH] IN BLOOD BY AUTOMATED COUNT: 14.1 % (ref 11.5–17)
GFR SERPLBLD CREATININE-BSD FMLA CKD-EPI: 26 MLS/MIN/1.73/M2
GLOBULIN SER-MCNC: 4.2 GM/DL (ref 2.4–3.5)
GLUCOSE SERPL-MCNC: 95 MG/DL (ref 82–115)
GLUCOSE UR QL STRIP.AUTO: NORMAL
GRAN CASTS #/AREA URNS LPF: ABNORMAL /LPF
HCT VFR BLD AUTO: 39.9 % (ref 42–52)
HGB BLD-MCNC: 12.8 G/DL (ref 14–18)
HYALINE CASTS #/AREA URNS LPF: >20 /LPF
IMM GRANULOCYTES # BLD AUTO: 0.04 X10(3)/MCL (ref 0–0.04)
IMM GRANULOCYTES NFR BLD AUTO: 0.7 %
KETONES UR QL STRIP.AUTO: ABNORMAL
LEUKOCYTE ESTERASE UR QL STRIP.AUTO: 250
LYMPHOCYTES # BLD AUTO: 2.11 X10(3)/MCL (ref 0.6–4.6)
LYMPHOCYTES NFR BLD AUTO: 35 %
MCH RBC QN AUTO: 31 PG (ref 27–31)
MCHC RBC AUTO-ENTMCNC: 32.1 G/DL (ref 33–36)
MCV RBC AUTO: 96.6 FL (ref 80–94)
MONOCYTES # BLD AUTO: 0.8 X10(3)/MCL (ref 0.1–1.3)
MONOCYTES NFR BLD AUTO: 13.3 %
MUCOUS THREADS URNS QL MICRO: ABNORMAL /LPF
N GONORRHOEA DNA SPEC QL NAA+PROBE: NOT DETECTED
NEUTROPHILS # BLD AUTO: 2.67 X10(3)/MCL (ref 2.1–9.2)
NEUTROPHILS NFR BLD AUTO: 44.3 %
NITRITE UR QL STRIP.AUTO: NEGATIVE
NRBC BLD AUTO-RTO: 0 %
PH UR STRIP.AUTO: 5.5 [PH]
PLATELET # BLD AUTO: 313 X10(3)/MCL (ref 130–400)
PMV BLD AUTO: 11.2 FL (ref 7.4–10.4)
POTASSIUM SERPL-SCNC: 3.9 MMOL/L (ref 3.5–5.1)
PROT SERPL-MCNC: 8 GM/DL (ref 5.8–7.6)
PROT UR QL STRIP.AUTO: ABNORMAL
RBC # BLD AUTO: 4.13 X10(6)/MCL (ref 4.7–6.1)
RBC #/AREA URNS AUTO: ABNORMAL /HPF
RBC UR QL AUTO: NEGATIVE
SODIUM SERPL-SCNC: 140 MMOL/L (ref 136–145)
SOURCE (OHS): NORMAL
SP GR UR STRIP.AUTO: 1.02 (ref 1–1.03)
SQUAMOUS #/AREA URNS LPF: ABNORMAL /HPF
T PALLIDUM AB SER QL: REACTIVE
UROBILINOGEN UR STRIP-ACNC: NORMAL
WBC # SPEC AUTO: 6.02 X10(3)/MCL (ref 4.5–11.5)
WBC #/AREA URNS AUTO: ABNORMAL /HPF

## 2024-01-25 PROCEDURE — 1125F AMNT PAIN NOTED PAIN PRSNT: CPT | Mod: CPTII,,, | Performed by: NURSE PRACTITIONER

## 2024-01-25 PROCEDURE — 87536 HIV-1 QUANT&REVRSE TRNSCRPJ: CPT

## 2024-01-25 PROCEDURE — 86780 TREPONEMA PALLIDUM: CPT

## 2024-01-25 PROCEDURE — 36415 COLL VENOUS BLD VENIPUNCTURE: CPT

## 2024-01-25 PROCEDURE — 3288F FALL RISK ASSESSMENT DOCD: CPT | Mod: CPTII,,, | Performed by: NURSE PRACTITIONER

## 2024-01-25 PROCEDURE — 99214 OFFICE O/P EST MOD 30 MIN: CPT | Mod: ,,, | Performed by: NURSE PRACTITIONER

## 2024-01-25 PROCEDURE — 80053 COMPREHEN METABOLIC PANEL: CPT

## 2024-01-25 PROCEDURE — 3079F DIAST BP 80-89 MM HG: CPT | Mod: CPTII,,, | Performed by: NURSE PRACTITIONER

## 2024-01-25 PROCEDURE — 99214 OFFICE O/P EST MOD 30 MIN: CPT | Mod: PBBFAC

## 2024-01-25 PROCEDURE — 3074F SYST BP LT 130 MM HG: CPT | Mod: CPTII,,, | Performed by: NURSE PRACTITIONER

## 2024-01-25 PROCEDURE — 1159F MED LIST DOCD IN RCRD: CPT | Mod: CPTII,,, | Performed by: NURSE PRACTITIONER

## 2024-01-25 PROCEDURE — 87491 CHLMYD TRACH DNA AMP PROBE: CPT

## 2024-01-25 PROCEDURE — 81001 URINALYSIS AUTO W/SCOPE: CPT

## 2024-01-25 PROCEDURE — 3008F BODY MASS INDEX DOCD: CPT | Mod: CPTII,,, | Performed by: NURSE PRACTITIONER

## 2024-01-25 PROCEDURE — 85025 COMPLETE CBC W/AUTO DIFF WBC: CPT

## 2024-01-25 PROCEDURE — 87086 URINE CULTURE/COLONY COUNT: CPT

## 2024-01-25 PROCEDURE — 86361 T CELL ABSOLUTE COUNT: CPT

## 2024-01-25 PROCEDURE — 86592 SYPHILIS TEST NON-TREP QUAL: CPT

## 2024-01-25 PROCEDURE — 1101F PT FALLS ASSESS-DOCD LE1/YR: CPT | Mod: CPTII,,, | Performed by: NURSE PRACTITIONER

## 2024-01-25 RX ORDER — AMITRIPTYLINE HYDROCHLORIDE 75 MG/1
75 TABLET ORAL NIGHTLY
COMMUNITY
Start: 2023-09-07

## 2024-01-25 RX ORDER — METHYLPREDNISOLONE 4 MG/1
TABLET ORAL
Qty: 21 EACH | Refills: 0 | Status: SHIPPED | OUTPATIENT
Start: 2024-01-25 | End: 2024-02-15

## 2024-01-25 RX ORDER — TADALAFIL 5 MG/1
7.5 TABLET ORAL DAILY PRN
Qty: 20 TABLET | Refills: 0 | Status: SHIPPED | OUTPATIENT
Start: 2024-01-25 | End: 2024-04-25 | Stop reason: SDUPTHER

## 2024-01-25 NOTE — ADDENDUM NOTE
Addended by: HEATHER GILES on: 1/25/2024 12:41 PM     Modules accepted: Level of Service     74M PMH HTN, prostate CA (s/p chemo), BPH w/indwelling reyes, who presents s/p recurrent syncopal episodes. Pe had reportedly been feeling weak for several days and had multiple episodes of syncope. Today, he was unresponsive in bed for 1 minute and EMS was called. When EMS arrived his SpO2 was 85% on RA. Patient denies headache, N/V, diarrhea, constipation, CP, SOB, abdominal pain, sick contact.    ED Vitals: T97.8, , /77, RR20, SpO2 83 on RA.  Initial labs demonstrated WBC 9.95, Hgb 12.1, Plt 153, Cr 1.41, Trop 0.08, Pro-BNP 2545, VBG pH 41, VBG pCO2 41, VBG HCO3 24.  CT Head revealed no acute intracranial bleeding, mass effect, or shift. Volume loss and chronic microvascular ischemic changes.  CT Angio revealed extensive bilateral pulmonary emboli w/extension into the lobar, segmental, and subsegmental pulmonary arteries. Findings are suggestive of resulting right heart strain. Patchy right basilar airspace opacity due to early pulmonary infarction versus dependent change. Small right pleural effusion.  Patient was given 1L NS bolus in the ED, , and started on IV heparin drip. 74M PMH HTN, prostate CA (s/p chemo), BPH w/indwelling reyes, who presents s/p recurrent syncopal episodes. Pt had reportedly been feeling weak for several days and had multiple episodes of syncope. Today, he was unresponsive in bed for 1 minute, found by wife who called EMS. When EMS arrived his SpO2 was 85% on RA. Patient denies headache, N/V, diarrhea, constipation, CP, SOB, abdominal pain, sick contact.    ED Vitals: T97.8, , /77, RR20, SpO2 83 on RA.  Initial labs demonstrated WBC 9.95, Hgb 12.1, Plt 153, Cr 1.41, Trop 0.08, Pro-BNP 2545, VBG pH 41, VBG pCO2 41, VBG HCO3 24.  CT Head revealed no acute intracranial bleeding, mass effect, or shift. Volume loss and chronic microvascular ischemic changes.  CT Angio revealed extensive bilateral pulmonary emboli w/extension into the lobar, segmental, and subsegmental pulmonary arteries and flattening of interventricular septum with reflex of contrast into IVC suggestive of resulting right heart strain. Patchy right basilar airspace opacity due to early pulmonary infarction versus dependent change. Small right pleural effusion.  Patient was given 1L NS bolus in the ED, , and started on IV heparin drip. MICU consulted for management of sub-massive PE with need for close monitoring post possible TPA.

## 2024-01-25 NOTE — PROGRESS NOTES
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 Last HCV viral load undetected 6/2023. Syphilis Ab reactive, RPR nonreactive 6/2023. Hepatitis C co-infection in which he was treated and cured (1/2020 with Mavyret x 8 weeks - F1-F2 disease). The patient is currently taking  Descovy and Tivicay  and endorses 100% compliance.  He denies subjective complaints such as fever, chills, nausea, vomiting, abdominal pain. He claims to have chronic intermittent erectile dysfunction for which he is medicated with Cialis PRN per PCP. His last sexual activity was over a month ago due to erectile dysfunction, he has the same female partner for the past year, he says he uses condom consistently.    Smoke 30 years consuming 1 pack for 3-4 days, drinks alcohol consuming 6 pack of beers in a week, denies active drug use.    HIV history:  Diagnosed 2017 - CD4 60 and VL 25,000 at diagnosis   Risk factor - MSF  History OI - Cryptococal meningitis  History STIs - Hep c, chlamydia  Prior ART:   - None  Genotype testing: wildtype              -NRTI resistance mutations: none              -NNRTI resistance mutations: none              -PI resistance mutations: none  HLA- testing negative  G6PD testing unknown    Health Screening:    The following items were screened for at today's visit:  [] Substance Abuse  [] Alcohol Abuse  [] Depression  [] Employment  [] Housing Situation/Homelessness  [] Travel  [] TB Exposure   [] Domestic Abuse   [] Smoking Cessation    Labs  HIV Viral Load: check q3-6 months    Results for orders placed or performed in visit on 06/12/23   HIV-1 RNA, Quantitative, PCR with Reflex to Genotype   Result Value Ref Range    HIV-1 RNA Detect/Quant, P 32 (A) Undetected copies/mL     CD4 Count: check q3-6  months  Results for orders placed or performed in visit on 06/12/23   CD4 Lymphocytes   Result Value Ref Range    Patient Age 67     WBC Absolute 5,250 4,500 - 11,500 /mm3    Lymph Percent 28 28 - 48 %    Lymph Absolute 1,470 1,260 - 5,520 x10(3)/mcL    CD4 % 37.3 %    CD4 Absolute 548.31 (L) 589 - 1,505 unit/L    T Cell Interp       Normal absolute lymphocyte count with decreased absolute CD4+ lymphocyte count.    Hoang Diane M.D.        CBC with Differential: check q3-6 months  Lab Results   Component Value Date    WBC 5.25 06/12/2023    RBC 4.13 (L) 06/12/2023    HGB 13.1 (L) 06/12/2023    HCT 40.4 (L) 06/12/2023    MCV 97.8 (H) 06/12/2023    MCH 31.7 (H) 06/12/2023    MCHC 32.4 (L) 06/12/2023    RDW 13.7 06/12/2023     06/12/2023    MPV 11.2 (H) 06/12/2023     CMP: check q3-6 months  Lab Results   Component Value Date     06/12/2023    K 4.3 06/12/2023    GLUCOSE 96 06/12/2023    BUN 18.6 06/12/2023    CREATININE 1.94 (H) 06/12/2023    CALCIUM 9.3 06/12/2023    BILITOT 0.2 06/12/2023    AST 32 06/12/2023    ALT 35 06/12/2023    ALKPHOS 96 06/12/2023    ALBUMIN 3.9 06/12/2023     Quantiferon: check q1yr  Results for orders placed or performed in visit on 06/12/23   Quantiferon Gold TB   Result Value Ref Range    QuantiFERON-Tb Gold Plus Result Negative Negative    TB1 Ag minus Nil Result 0.00 IU/mL    TB2 Ag minus Nil Result 0.00 IU/mL    Mitogen minus Nil Result 9.94 IU/mL    Nil Result 0.06 IU/mL     Syphilis IgG: check q1yr, unless Hx syphilis then RPR titer q1yr  Results for orders placed or performed in visit on 06/12/23   SYPHILIS ANTIBODY (WITH REFLEX RPR)   Result Value Ref Range    Syphilis Antibody Reactive (A) Nonreactive, Equivocal     Results for orders placed or performed in visit on 06/12/23   RPR   Result Value Ref Range    RPR Non-Reactive Non-Reactive    RPR Titer       Toxoplasmosis IgG: check once  No results found for this or any previous visit.  Gonorrhea: check  q1yr  Results for orders placed or performed in visit on 06/12/23   C.trach/N.gonor AMP RNA   Result Value Ref Range    Chlamydia trachomatis amplified RNA Negative Negative    Neisseria gonorrhoeae amplified RNA Negative Negative    Source SEE COMMENTS     SOURCE: SEE COMMENTS      Lipid Panel: check q1yr  Lab Results   Component Value Date    TRIG 152 (H) 08/05/2021    CHOL 160 08/05/2021    HDL 62 (H) 08/05/2021    VLDL 30 08/05/2021     A1C: check q1yr  Results for orders placed or performed in visit on 01/21/20   Hemoglobin A1C   Result Value Ref Range    Estimated Average Glucose 143 (H) <<=115 mg/dL    Hemoglobin A1c 6.6 (H) 4.2 - 6.3 %     Urinalysis: check q1yr, check q6mo if taking tenofovir  Lab Results   Component Value Date    PROTEINUA Trace (A) 06/12/2023    UROBILINOGEN Normal 06/12/2023    KETONESU Negative 01/20/2022    NITRITE Negative 01/20/2022    LEUKOCYTESUR Negative 06/12/2023    COLORU Light-Yellow 01/20/2022    RBCUA 0-5 06/12/2023     Glucose-6 Phosphate Dehydrogenase: check once  No results found for this or any previous visit.  Hepatitis A IgG: check once  Results for orders placed or performed in visit on 08/15/22   Hepatitis A antibody, IgG   Result Value Ref Range    Hep A IGG Reactive (A) Nonreactive     Hepatitis B Surface Antibody: check once  Results for orders placed or performed in visit on 08/15/22   Hepatitis B Surface Ab, Qualitative   Result Value Ref Range    Hepatitis B Surface Antibody Reactive (A) Nonreactive    Hepatitis B Surface Antibody Qnt 24.66 mIU/mL     Hepatitis C Antibody: check q1yr if high risk, once if low risk  No results found for this or any previous visit.      Vaccines  Pneumovax-23: if CD4>200 give twice q5yrs apart before age 65, then once at 65, give >8wks from last Prevnar  Prevnar-13: if CD4>200 give once, give >1yr from last Pneumovax-23  Influenza vaccine: q1yr  HPV: Gardasil-9 at 0, 2, and 6 months for ages 15-45  Tdap: if age >35 give Td  q10yrs, replace with Tdap once  MenACWY: 2 dose primary series 8 weeks apart, then 1 dose booster q5yr  Shingrix: All HIV above >17 y/o receive 2 dose series, timed 4 weeks to 6 months apart  Immunization History   Administered Date(s) Administered    COVID-19 Vaccine 03/19/2021, 04/16/2021, 01/17/2022    COVID-19, MRNA, LN-S, PF (MODERNA FULL 0.5 ML DOSE) 03/19/2021, 04/16/2021, 01/17/2022    Influenza 10/08/2020    Influenza (FLUAD) - Quadrivalent - Adjuvanted - PF *Preferred* (65+) 01/11/2023, 09/25/2023    Influenza - High Dose - PF (65 years and older) 10/08/2020    Influenza - Quadrivalent - High Dose - PF (65 years and older) 01/20/2022    Influenza - Quadrivalent - PF (6-35 months) 12/13/2018, 01/21/2020    Influenza - Quadrivalent - PF *Preferred* (6 months and older) 10/08/2020    Influenza - Trivalent (ADULT) 12/27/2005, 01/19/2011    Influenza - Trivalent - PF (ADULT) 12/27/2005, 01/19/2011, 10/08/2020    Meningococcal Conjugate (MCV4O) 2 Vial (2mo-55yr) 01/21/2020, 01/20/2022    Pneumococcal Conjugate - 13 Valent 12/13/2018    Pneumococcal Polysaccharide - 23 Valent 07/12/2018, 03/28/2019    Td (ADULT) 02/01/2021, 08/22/2022    Tdap 01/21/2020, 11/13/2021    Zoster Recombinant 01/20/2022, 06/12/2023       Imaging  DEXA Scan: if age>50, check q10yrs  No results found for this or any previous visit.  Colonguard: check q1yr  No results found for this or any previous visit.  Abdominal Ultrasound: if age 65-75 and male and ever smoked check once, not needed if had CT abdomen  Mammogram: if female and age 50-75 check q2yrs  No results found for this or any previous visit.    Pathology:  Pap Smear: if female and age >21 check cervical pap q1yr, if male and MSM check rectal pap q1yr  No results found for this or any previous visit.    ASCVD Risk Score:  Consider high-intensity statin therapy if 10-year ASCVD risk score >7.5%  The 10-year ASCVD risk score (Magdi EVANS, et al., 2019) is: 20.4%    Values used to  "calculate the score:      Age: 68 years      Sex: Male      Is Non- : No      Diabetic: No      Tobacco smoker: Yes      Systolic Blood Pressure: 136 mmHg      Is BP treated: Yes      HDL Cholesterol: 62 mg/dL      Total Cholesterol: 160 mg/dL    Review of Systems   Constitutional:  Negative for chills and fever.   Respiratory:  Negative for cough and hemoptysis.    Cardiovascular:  Negative for chest pain and palpitations.   Genitourinary:  Negative for dysuria and urgency.   Neurological: Negative.        The following portions of the patient's history were reviewed and updated as appropriate: allergies, current medications, past family history, past medical history, past social history, past surgical history and problem list.    Objective:   Vitals:  /78 (BP Location: Left arm, Patient Position: Sitting, BP Method: Medium (Automatic))   Pulse 108   Temp 98.1 °F (36.7 °C) (Oral)   Resp 16   Ht 5' 8" (1.727 m)   Wt 95.7 kg (211 lb)   BMI 32.08 kg/m²  Body mass index is 32.08 kg/m².    Gen: awake, alert, NAD  HEENT: NC/AT, EOMi, anicteric sclera, no rhinorrhea, OP clear  Neck: no cervical LAD  CV: regular rate normal rhythm no murmur  Resp: easy WOB on RA CTABL no w/r/r  Abd: +BS, soft, NTTP, no HSM  Ext: warm, no LE edema  Skin: no rash  Neuro: no focal deficits       Assessment      1) HIV disease, most recent CD4  (last Cd4 548 (37.3%), VL 32) 6/2023  2) History of syphilis  3) History of HCV infection  4) History of cryptococcal meningitis  5) History of chlamydia  6) Erectile dysfunction    Plan    - Order repeat labs today (CBC, CMP, Cd4, VL)  - Ordering Syphilis, UA, GC/chlamydia urine NAAT  - Checking yearly quantiferon gold. Next due: 6/2023  - Continue Descovy and Tivicay. Refills given  - Educated on importance of 100% medication compliance  - Educated on need for barrier contraception   - Reviewed vaccinations today. Due for none.  - DXA scan ordered.  - Prescribed " Cialis 7.5 mg OD PRN for erectile dysfunction.    RTC 3 months    Ghulam Tracy MD  [employer] [facility] PGY-2

## 2024-01-26 LAB
AGE: 68
CD3+CD4+ CELLS # SPEC: 818 UNIT/L (ref 589–1505)
CD3+CD4+ CELLS NFR BLD: 38.8 %
LYMPHOCYTES # BLD AUTO: 2107 X10(3)/MCL (ref 1260–5520)
LYMPHOCYTES NFR LN MANUAL: 35 % (ref 28–48)
LYMPHOMA - T-CELL MARKERS SPEC-IMP: NORMAL
RPR SER QL: NORMAL
RPR SER-TITR: NORMAL {TITER}
WBC # BLD AUTO: 6020 /MM3 (ref 4500–11500)

## 2024-01-26 NOTE — PROGRESS NOTES
Spoke with patient on outpatient pharmacy preference. Patient prefers current pharmacy.     Kaiden Sapp, Pharm D

## 2024-01-27 LAB
BACTERIA UR CULT: NO GROWTH
HIV1 RNA # PLAS NAA DL=20: NORMAL COPIES/ML

## 2024-02-01 LAB — T PALLIDUM AB SER QL: REACTIVE

## 2024-03-26 ENCOUNTER — OFFICE VISIT (OUTPATIENT)
Dept: NEPHROLOGY | Facility: CLINIC | Age: 69
End: 2024-03-26
Payer: MEDICARE

## 2024-03-26 ENCOUNTER — LAB VISIT (OUTPATIENT)
Dept: LAB | Facility: HOSPITAL | Age: 69
End: 2024-03-26
Attending: NURSE PRACTITIONER
Payer: MEDICARE

## 2024-03-26 VITALS
OXYGEN SATURATION: 95 % | HEART RATE: 101 BPM | TEMPERATURE: 98 F | DIASTOLIC BLOOD PRESSURE: 76 MMHG | BODY MASS INDEX: 33.21 KG/M2 | RESPIRATION RATE: 18 BRPM | SYSTOLIC BLOOD PRESSURE: 120 MMHG | WEIGHT: 219.13 LBS | HEIGHT: 68 IN

## 2024-03-26 DIAGNOSIS — Z72.0 TOBACCO USER: ICD-10-CM

## 2024-03-26 DIAGNOSIS — I10 ESSENTIAL HYPERTENSION: ICD-10-CM

## 2024-03-26 DIAGNOSIS — N18.32 CKD STAGE G3B/A1, GFR 30-44 AND ALBUMIN CREATININE RATIO <30 MG/G: ICD-10-CM

## 2024-03-26 DIAGNOSIS — Z12.5 PROSTATE CANCER SCREENING: ICD-10-CM

## 2024-03-26 DIAGNOSIS — N18.32 CKD STAGE G3B/A1, GFR 30-44 AND ALBUMIN CREATININE RATIO <30 MG/G: Primary | ICD-10-CM

## 2024-03-26 DIAGNOSIS — N17.9 ACUTE KIDNEY INJURY: ICD-10-CM

## 2024-03-26 LAB
ALBUMIN SERPL-MCNC: 3.9 G/DL (ref 3.4–4.8)
ALBUMIN/GLOB SERPL: 0.9 RATIO (ref 1.1–2)
ALP SERPL-CCNC: 96 UNIT/L (ref 40–150)
ALT SERPL-CCNC: 24 UNIT/L (ref 0–55)
AST SERPL-CCNC: 23 UNIT/L (ref 5–34)
BILIRUB SERPL-MCNC: 0.2 MG/DL
BUN SERPL-MCNC: 28.2 MG/DL (ref 8.4–25.7)
CALCIUM SERPL-MCNC: 9.9 MG/DL (ref 8.8–10)
CHLORIDE SERPL-SCNC: 109 MMOL/L (ref 98–107)
CO2 SERPL-SCNC: 24 MMOL/L (ref 23–31)
CREAT SERPL-MCNC: 2.73 MG/DL (ref 0.73–1.18)
GFR SERPLBLD CREATININE-BSD FMLA CKD-EPI: 25 MLS/MIN/1.73/M2
GLOBULIN SER-MCNC: 4.4 GM/DL (ref 2.4–3.5)
GLUCOSE SERPL-MCNC: 103 MG/DL (ref 82–115)
POTASSIUM SERPL-SCNC: 4.8 MMOL/L (ref 3.5–5.1)
PROT SERPL-MCNC: 8.3 GM/DL (ref 5.8–7.6)
PSA SERPL-MCNC: 0.23 NG/ML
SODIUM SERPL-SCNC: 141 MMOL/L (ref 136–145)

## 2024-03-26 PROCEDURE — 99215 OFFICE O/P EST HI 40 MIN: CPT | Mod: PBBFAC | Performed by: NURSE PRACTITIONER

## 2024-03-26 PROCEDURE — 3078F DIAST BP <80 MM HG: CPT | Mod: CPTII,,, | Performed by: NURSE PRACTITIONER

## 2024-03-26 PROCEDURE — 3008F BODY MASS INDEX DOCD: CPT | Mod: CPTII,,, | Performed by: NURSE PRACTITIONER

## 2024-03-26 PROCEDURE — 4010F ACE/ARB THERAPY RXD/TAKEN: CPT | Mod: CPTII,,, | Performed by: NURSE PRACTITIONER

## 2024-03-26 PROCEDURE — 99214 OFFICE O/P EST MOD 30 MIN: CPT | Mod: S$PBB,,, | Performed by: NURSE PRACTITIONER

## 2024-03-26 PROCEDURE — 80053 COMPREHEN METABOLIC PANEL: CPT

## 2024-03-26 PROCEDURE — 3066F NEPHROPATHY DOC TX: CPT | Mod: CPTII,,, | Performed by: NURSE PRACTITIONER

## 2024-03-26 PROCEDURE — 1101F PT FALLS ASSESS-DOCD LE1/YR: CPT | Mod: CPTII,,, | Performed by: NURSE PRACTITIONER

## 2024-03-26 PROCEDURE — 1160F RVW MEDS BY RX/DR IN RCRD: CPT | Mod: CPTII,,, | Performed by: NURSE PRACTITIONER

## 2024-03-26 PROCEDURE — 36415 COLL VENOUS BLD VENIPUNCTURE: CPT

## 2024-03-26 PROCEDURE — 3074F SYST BP LT 130 MM HG: CPT | Mod: CPTII,,, | Performed by: NURSE PRACTITIONER

## 2024-03-26 PROCEDURE — 3288F FALL RISK ASSESSMENT DOCD: CPT | Mod: CPTII,,, | Performed by: NURSE PRACTITIONER

## 2024-03-26 PROCEDURE — 84153 ASSAY OF PSA TOTAL: CPT

## 2024-03-26 PROCEDURE — 1159F MED LIST DOCD IN RCRD: CPT | Mod: CPTII,,, | Performed by: NURSE PRACTITIONER

## 2024-03-26 PROCEDURE — 1125F AMNT PAIN NOTED PAIN PRSNT: CPT | Mod: CPTII,,, | Performed by: NURSE PRACTITIONER

## 2024-03-26 RX ORDER — OMEGA-3S/DHA/EPA/FISH OIL/D3 300MG-1000
1 CAPSULE ORAL DAILY
COMMUNITY
Start: 2023-12-14 | End: 2024-03-26 | Stop reason: SDUPTHER

## 2024-03-26 NOTE — PROGRESS NOTES
"[facility]  Nephrology Clinic Note    Chief complaint: Follow-up (RTC, did not take med, some dyspnea)    History of present illness:   Renetta Glover Jr. is a 68 y.o. Other male with past medical history of chronic kidney disease, HIV, treated hepatitis C, syphilis, arthritis, nephrolithiasis, past history of polysubstance abuse, and hypertension. Presents for follow-up appointment in nephrology clinic.  Complains of incomplete complains of feeling of incomplete bladder emptying on occasion.    Review of Systems  12 point review of systems conducted, negative except as stated in the history of present illness.    Allergies: Patient has No Known Allergies.     Past Medical History:  has a past medical history of Disorder of kidney and ureter, GERD (gastroesophageal reflux disease), History of cryptococcal meningitis, History of syphilis, Human immunodeficiency virus (HIV) disease, and Hypertension.    Procedure History:  has a past surgical history that includes Hip replacement arthroplasty (Right, 02/02/2021); Esophagogastroduodenoscopy (N/A, 06/20/2022); robotic arthroplasty, hip (Left, 08/18/2022); and Colonoscopy.    Family History: family history includes Diabetes in his brother and mother.    Social History:  reports that he has been smoking cigarettes. He started smoking about 48 years ago. He has a 12.1 pack-year smoking history. He uses smokeless tobacco. He reports current alcohol use of about 6.0 standard drinks of alcohol per week. He reports current drug use. Drug: "Crack" cocaine.    Physical exam  /76 (BP Location: Left arm, Patient Position: Sitting, BP Method: Large (Automatic))   Pulse 101   Temp 97.9 °F (36.6 °C) (Oral)   Resp 18   Ht 5' 7.72" (1.72 m)   Wt 99.4 kg (219 lb 2.2 oz)   SpO2 95%   BMI 33.60 kg/m²   General appearance: Patient is in no acute distress.  Skin: No rashes or wounds.  HEENT: PERRLA, EOMI, no scleral icterus, no JVD. Neck is " supple.  Chest: Respirations are unlabored. Lungs sounds are clear.   Heart: S1, S2.   Abdomen: Benign.  : Deferred.  Extremities: No edema, peripheral pulses are palpable.   Neuro: No focal deficits.     Home Medications:    Current Outpatient Medications:     amitriptyline (ELAVIL) 75 MG tablet, Take 75 mg by mouth every evening., Disp: , Rfl:     amLODIPine (NORVASC) 10 MG tablet, Take 1 tablet (10 mg total) by mouth once daily., Disp: 90 tablet, Rfl: 1    ammonium lactate 12 % Crea, Apply topically 2 (two) times a day. Apply to feet, Disp: , Rfl:     aspirin (ECOTRIN) 81 MG EC tablet, Take 1 tablet (81 mg total) by mouth 2 (two) times a day. Increase to twice a day for 6 weeks and then restart daily dose on 10/4/22, Disp: 84 tablet, Rfl: 0    cholecalciferol, vitamin D3, (VITAMIN D3) 50 mcg (2,000 unit) Cap capsule, Take 1 capsule (2,000 Units total) by mouth once daily. For low vitamin D level, Disp: 30 capsule, Rfl: 3    dolutegravir (TIVICAY) 50 mg Tab, Take 1 tablet (50 mg total) by mouth once daily., Disp: 30 tablet, Rfl: 3    emtricitabine-tenofovir alafen (DESCOVY) 200-25 mg Tab, Take 1 tablet by mouth Daily., Disp: 30 tablet, Rfl: 3    levothyroxine (SYNTHROID) 75 MCG tablet, Take 1 tablet (75 mcg total) by mouth before breakfast. For thyroid, Disp: 90 tablet, Rfl: 0    losartan (COZAAR) 50 MG tablet, Take 1 tablet (50 mg total) by mouth once daily. For blood pressure, Disp: 90 tablet, Rfl: 1    omeprazole (PRILOSEC) 20 MG capsule, Take 1 capsule (20 mg total) by mouth once daily. For reflux, Disp: 90 capsule, Rfl: 1    tadalafiL (CIALIS) 5 MG tablet, Take 1.5 tablets (7.5 mg total) by mouth daily as needed for Erectile Dysfunction., Disp: 20 tablet, Rfl: 0    Laboratory data    Lab Results   Component Value Date    WBC 6.02 01/25/2024    HGB 12.8 (L) 01/25/2024    HCT 39.9 (L) 01/25/2024     01/25/2024    IRON 75 03/28/2019    TIBC 328 03/28/2019    TRANS 263.0 03/28/2019    LABIRON 22.9  03/28/2019    FERRITIN 539.6 (H) 07/13/2017    FOLATE 17.9 (H) 07/12/2018    YMJBWZKB13 261 07/12/2018     07/24/2017     03/26/2024    K 4.8 03/26/2024    CHLORIDE 109 (H) 03/26/2024    CO2 24 03/26/2024    BUN 28.2 (H) 03/26/2024    CREATININE 2.73 (H) 03/26/2024    EGFRNORACEVR 25 03/26/2024    GLUCOSE 103 03/26/2024    CALCIUM 9.9 03/26/2024    ALKPHOS 96 03/26/2024    LABPROT 8.3 (H) 03/26/2024    ALBUMIN 3.9 03/26/2024    BILIDIR 0.1 01/20/2022    IBILI 0.10 01/20/2022    AST 23 03/26/2024    ALT 24 03/26/2024    MG 1.9 08/04/2017    PHOS 2.7 08/04/2017      Lab Results   Component Value Date    HGBA1C 5.6 01/15/2021    [id number] 34.4 06/12/2023    HIV Reactive (A) 09/05/2019    HEPCAB Reactive (A) 08/05/2021    HEPBSURFAG Nonreactive 06/12/2023    HEPBSAB Reactive (A) 08/15/2022     Urine:  Lab Results   Component Value Date    COLORUA Yellow 01/25/2024    APPEARANCEUA Clear 01/25/2024    SGUA 1.023 01/25/2024    PHUA 5.5 01/25/2024    PROTEINUA 1+ (A) 01/25/2024    GLUCOSEUA Normal 01/25/2024    KETONESUA Trace (A) 01/25/2024    BLOODUA Negative 01/25/2024    NITRITESUA Negative 01/25/2024    LEUKOCYTESUR 250 (A) 01/25/2024    RBCUA 0-5 01/25/2024    WBCUA 21-50 (A) 01/25/2024    BACTERIA None Seen 01/25/2024    SQEPUA Trace (A) 01/25/2024    HYALINECASTS >20 (A) 01/25/2024    CREATRANDUR 233.9 (H) 03/23/2023    PROTEINURINE 34.4 03/23/2023    UPROTCREA 0.1 03/23/2023         Imaging  CT of abdomen and pelvis with and without contrast 07/14/2017:  ADRENALS: No adrenal nodules.  KIDNEYS/URETERS: Evaluation for radiodense stones limited by contrast  in the bilateral collecting systems. No hydronephrosis or solid renal  masses.      Impression    ICD-10-CM ICD-9-CM   1. CKD stage G3b/A1, GFR 30-44 and albumin creatinine ratio <30 mg/g  N18.32 585.3   2. Acute kidney injury  N17.9 584.9   3. Prostate cancer screening  Z12.5 V76.44   4. Essential hypertension  I10 401.9   5. Tobacco user  Z72.0  305.1   6. BMI 33.0-33.9,adult  Z68.33 V85.33        Plan     CKD stage G3b/A1, GFR 30-44 and albumin creatinine ratio <30 mg/g  -     Comprehensive Metabolic Panel; Future; Expected date: 05/13/2024  -     Protein/Creatinine Ratio, Urine; Future; Expected date: 05/13/2024  -     Urinalysis, Reflex to Urine Culture; Future; Expected date: 05/13/2024  -     Phosphorus; Future; Expected date: 05/13/2024  -     PTH, Intact; Future; Expected date: 05/13/2024  Possibly hypertensive kidney disease.  There is no significant proteinuria.  Renal indices have worsened, will order kidney ultrasound to rule out obstructive uropathy.  Patient was encouraged to increase oral fluid intake.  Continue close follow-up.  Continue risk factor management and periodic monitoring.    Continue:  -follow 2 g a day dietary sodium restriction  -control high blood pressure (goal blood pressure is less than 130/80, please check blood pressure twice a week and bring blood pressure logs to office visit)  -exercise at least 30 minutes a day, 5 days a week  -maintain healthy weight  -decrease or stop alcohol use  -do not smoke  -stay well hydrated (drink water only, avoid juices, sweet tea, and sodas)  -ask about staying up-to-date on vaccinations (flu vaccine, pneumonia vaccine, hepatitis B vaccine)  -avoid excessive use of NSAIDs (ibuprofen, naproxen, Aleve, Advil, Toradol, Mobic), take Tylenol as needed for headache or mild pain  -take cholesterol lowering medications if prescribed (LDL goal less than 100)  Follow up in about 2 months (around 5/26/2024).    Acute kidney injury  -     US Retroperitoneal Limited; Future; Expected date: 04/02/2024  Will order kidney ultrasound to rule out obstructive uropathy.  Patient was encouraged to increase oral fluid intake.  Will continue to monitor closely, RTC in 2 months.      Prostate cancer screening  -     PSA, Screening; Future; Expected date: 03/26/2024  Will check PSA today.    Essential  hypertension  Blood pressure reading is at goal, continue current antihypertensive regimen and 2 g a day dietary sodium restriction.      Tobacco user  Patient was counseled on importance of tobacco use cessation.  Strongly encouraged to quit, offered a referral to a smoking cessation program.    BMI 33.0-33.9,adult  Lifestyle and dietary interventions discussed, patient encouraged to maintain non-sedentary lifestyle and well-balanced diet.           3/26/2024  Alison Lemon NP  [facility]

## 2024-04-25 ENCOUNTER — OFFICE VISIT (OUTPATIENT)
Dept: INFECTIOUS DISEASES | Facility: CLINIC | Age: 69
End: 2024-04-25
Payer: MEDICARE

## 2024-04-25 ENCOUNTER — TELEPHONE (OUTPATIENT)
Dept: INFECTIOUS DISEASES | Facility: CLINIC | Age: 69
End: 2024-04-25

## 2024-04-25 ENCOUNTER — TELEPHONE (OUTPATIENT)
Dept: NEPHROLOGY | Facility: CLINIC | Age: 69
End: 2024-04-25
Payer: MEDICARE

## 2024-04-25 ENCOUNTER — LAB VISIT (OUTPATIENT)
Dept: LAB | Facility: HOSPITAL | Age: 69
End: 2024-04-25
Attending: STUDENT IN AN ORGANIZED HEALTH CARE EDUCATION/TRAINING PROGRAM
Payer: MEDICARE

## 2024-04-25 VITALS
BODY MASS INDEX: 33.74 KG/M2 | DIASTOLIC BLOOD PRESSURE: 72 MMHG | SYSTOLIC BLOOD PRESSURE: 115 MMHG | WEIGHT: 214.94 LBS | HEIGHT: 67 IN | RESPIRATION RATE: 16 BRPM | HEART RATE: 101 BPM | TEMPERATURE: 98 F

## 2024-04-25 DIAGNOSIS — Z00.00 HEALTH CARE MAINTENANCE: ICD-10-CM

## 2024-04-25 DIAGNOSIS — N18.32 STAGE 3B CHRONIC KIDNEY DISEASE: ICD-10-CM

## 2024-04-25 DIAGNOSIS — E55.9 VITAMIN D DEFICIENCY: ICD-10-CM

## 2024-04-25 DIAGNOSIS — B20 HIV DISEASE: Primary | ICD-10-CM

## 2024-04-25 DIAGNOSIS — N18.32 CKD STAGE G3B/A1, GFR 30-44 AND ALBUMIN CREATININE RATIO <30 MG/G: ICD-10-CM

## 2024-04-25 DIAGNOSIS — Z86.19 HISTORY OF CRYPTOCOCCAL MENINGITIS: ICD-10-CM

## 2024-04-25 DIAGNOSIS — B20 HIV DISEASE: ICD-10-CM

## 2024-04-25 DIAGNOSIS — N52.9 ERECTILE DYSFUNCTION, UNSPECIFIED ERECTILE DYSFUNCTION TYPE: ICD-10-CM

## 2024-04-25 DIAGNOSIS — Z86.19 HEPATITIS C VIRUS INFECTION CURED AFTER ANTIVIRAL DRUG THERAPY: ICD-10-CM

## 2024-04-25 LAB
ALBUMIN SERPL-MCNC: 4.2 G/DL (ref 3.4–4.8)
ALBUMIN/GLOB SERPL: 1 RATIO (ref 1.1–2)
ALP SERPL-CCNC: 84 UNIT/L (ref 40–150)
ALT SERPL-CCNC: 30 UNIT/L (ref 0–55)
APPEARANCE UR: CLEAR
AST SERPL-CCNC: 21 UNIT/L (ref 5–34)
BACTERIA #/AREA URNS AUTO: ABNORMAL /HPF
BASOPHILS # BLD AUTO: 0.08 X10(3)/MCL
BASOPHILS NFR BLD AUTO: 0.9 %
BILIRUB SERPL-MCNC: 0.3 MG/DL
BILIRUB UR QL STRIP.AUTO: NEGATIVE
BUN SERPL-MCNC: 39.3 MG/DL (ref 8.4–25.7)
CALCIUM SERPL-MCNC: 9.9 MG/DL (ref 8.8–10)
CHLORIDE SERPL-SCNC: 108 MMOL/L (ref 98–107)
CO2 SERPL-SCNC: 22 MMOL/L (ref 23–31)
COLOR UR AUTO: ABNORMAL
CREAT SERPL-MCNC: 4.35 MG/DL (ref 0.73–1.18)
DEPRECATED CALCIDIOL+CALCIFEROL SERPL-MC: 33.9 NG/ML (ref 30–80)
EOSINOPHIL # BLD AUTO: 0.36 X10(3)/MCL (ref 0–0.9)
EOSINOPHIL NFR BLD AUTO: 4 %
ERYTHROCYTE [DISTWIDTH] IN BLOOD BY AUTOMATED COUNT: 13.9 % (ref 11.5–17)
GFR SERPLBLD CREATININE-BSD FMLA CKD-EPI: 14 MLS/MIN/1.73/M2
GLOBULIN SER-MCNC: 4.4 GM/DL (ref 2.4–3.5)
GLUCOSE SERPL-MCNC: 108 MG/DL (ref 82–115)
GLUCOSE UR QL STRIP.AUTO: NORMAL
HCT VFR BLD AUTO: 39 % (ref 42–52)
HGB BLD-MCNC: 13 G/DL (ref 14–18)
HYALINE CASTS #/AREA URNS LPF: ABNORMAL /LPF
IMM GRANULOCYTES # BLD AUTO: 0.06 X10(3)/MCL (ref 0–0.04)
IMM GRANULOCYTES NFR BLD AUTO: 0.7 %
KETONES UR QL STRIP.AUTO: NEGATIVE
LEUKOCYTE ESTERASE UR QL STRIP.AUTO: 25
LYMPHOCYTES # BLD AUTO: 2.37 X10(3)/MCL (ref 0.6–4.6)
LYMPHOCYTES NFR BLD AUTO: 26.6 %
MCH RBC QN AUTO: 32.7 PG (ref 27–31)
MCHC RBC AUTO-ENTMCNC: 33.3 G/DL (ref 33–36)
MCV RBC AUTO: 98 FL (ref 80–94)
MONOCYTES # BLD AUTO: 1.06 X10(3)/MCL (ref 0.1–1.3)
MONOCYTES NFR BLD AUTO: 11.9 %
MUCOUS THREADS URNS QL MICRO: ABNORMAL /LPF
NEUTROPHILS # BLD AUTO: 4.97 X10(3)/MCL (ref 2.1–9.2)
NEUTROPHILS NFR BLD AUTO: 55.9 %
NITRITE UR QL STRIP.AUTO: NEGATIVE
NRBC BLD AUTO-RTO: 0 %
PH UR STRIP.AUTO: 5.5 [PH]
PHOSPHATE SERPL-MCNC: 4.4 MG/DL (ref 2.3–4.7)
PLATELET # BLD AUTO: 330 X10(3)/MCL (ref 130–400)
PMV BLD AUTO: 10.6 FL (ref 7.4–10.4)
POTASSIUM SERPL-SCNC: 4.8 MMOL/L (ref 3.5–5.1)
PROT SERPL-MCNC: 8.6 GM/DL (ref 5.8–7.6)
PROT UR QL STRIP.AUTO: ABNORMAL
PTH-INTACT SERPL-MCNC: 162.9 PG/ML (ref 8.7–77)
RBC # BLD AUTO: 3.98 X10(6)/MCL (ref 4.7–6.1)
RBC #/AREA URNS AUTO: ABNORMAL /HPF
RBC UR QL AUTO: NEGATIVE
RPR SER QL: NORMAL
RPR SER-TITR: NORMAL {TITER}
SODIUM SERPL-SCNC: 140 MMOL/L (ref 136–145)
SP GR UR STRIP.AUTO: 1.02 (ref 1–1.03)
SQUAMOUS #/AREA URNS LPF: ABNORMAL /HPF
T PALLIDUM AB SER QL: REACTIVE
UROBILINOGEN UR STRIP-ACNC: NORMAL
WBC # SPEC AUTO: 8.9 X10(3)/MCL (ref 4.5–11.5)
WBC #/AREA URNS AUTO: ABNORMAL /HPF

## 2024-04-25 PROCEDURE — 86592 SYPHILIS TEST NON-TREP QUAL: CPT

## 2024-04-25 PROCEDURE — 85025 COMPLETE CBC W/AUTO DIFF WBC: CPT

## 2024-04-25 PROCEDURE — 80053 COMPREHEN METABOLIC PANEL: CPT

## 2024-04-25 PROCEDURE — 82306 VITAMIN D 25 HYDROXY: CPT

## 2024-04-25 PROCEDURE — 86780 TREPONEMA PALLIDUM: CPT | Mod: 59

## 2024-04-25 PROCEDURE — 99213 OFFICE O/P EST LOW 20 MIN: CPT | Mod: PBBFAC,25

## 2024-04-25 PROCEDURE — 87522 HEPATITIS C REVRS TRNSCRPJ: CPT

## 2024-04-25 PROCEDURE — 81001 URINALYSIS AUTO W/SCOPE: CPT

## 2024-04-25 PROCEDURE — 83970 ASSAY OF PARATHORMONE: CPT

## 2024-04-25 PROCEDURE — 36415 COLL VENOUS BLD VENIPUNCTURE: CPT

## 2024-04-25 PROCEDURE — 87536 HIV-1 QUANT&REVRSE TRNSCRPJ: CPT

## 2024-04-25 PROCEDURE — 86360 T CELL ABSOLUTE COUNT/RATIO: CPT

## 2024-04-25 PROCEDURE — 86780 TREPONEMA PALLIDUM: CPT

## 2024-04-25 PROCEDURE — 84100 ASSAY OF PHOSPHORUS: CPT

## 2024-04-25 PROCEDURE — G0009 ADMIN PNEUMOCOCCAL VACCINE: HCPCS | Mod: PBBFAC

## 2024-04-25 PROCEDURE — 90677 PCV20 VACCINE IM: CPT | Mod: PBBFAC

## 2024-04-25 RX ORDER — TADALAFIL 5 MG/1
7.5 TABLET ORAL DAILY PRN
Qty: 20 TABLET | Refills: 0 | Status: SHIPPED | OUTPATIENT
Start: 2024-04-25 | End: 2025-04-25

## 2024-04-25 RX ORDER — DOLUTEGRAVIR SODIUM 50 MG/1
1 TABLET, FILM COATED ORAL DAILY
Qty: 30 TABLET | Refills: 3 | Status: SHIPPED | OUTPATIENT
Start: 2024-04-25

## 2024-04-25 RX ORDER — EMTRICITABINE AND TENOFOVIR ALAFENAMIDE 200; 25 MG/1; MG/1
1 TABLET ORAL DAILY
Qty: 30 TABLET | Refills: 3 | Status: SHIPPED | OUTPATIENT
Start: 2024-04-25

## 2024-04-25 RX ADMIN — PNEUMOCOCCAL 20-VALENT CONJUGATE VACCINE 0.5 ML
2.2; 2.2; 2.2; 2.2; 2.2; 2.2; 2.2; 2.2; 2.2; 2.2; 2.2; 2.2; 2.2; 2.2; 2.2; 2.2; 4.4; 2.2; 2.2; 2.2 INJECTION, SUSPENSION INTRAMUSCULAR at 09:04

## 2024-04-25 NOTE — TELEPHONE ENCOUNTER
Called pt's cell phone his sister Malena answered his phone. Malena tells me Renetta left his cell phone home and has not returned from [facility] yet. I advised her he needs to come back to [facility] ER due to elevated kidney function. She tells me Renetta was just there at [facility].  I explained to her the results are from his visit today and it is very important he comes to ER. Malena tells me he will not make it back to [facility] today due to no ride. He is on a medical ride and they will not bring him back today. They live in [place]. I advised her he can go to [facility] and let the staff know he was told to go due to his kidney function. Malena verbalized understanding and will have Renetta go to [facility] ER today.

## 2024-04-25 NOTE — TELEPHONE ENCOUNTER
----- Message from ALCON Becker sent at 4/25/2024  1:23 PM CDT -----  Please let the patient know that kidney function is worsening. Please ask him to stop losartan and increase fluid intake. He was supposed to do a kidney US but it doesn't look like it was ever completed, he needs to schedule and have it done.   Thank you

## 2024-04-25 NOTE — TELEPHONE ENCOUNTER
Labs reviewed from today's clinic visit. His Creatinine is significantly elevated at 4.35 which is above his baseline of 2.6. It seems he was seen in nephrology clinic 1 month ago and was told to increase his fluid intake, unsure if he has done this.    I recommend he present to the ED for evaluation for IV fluids or possible admission. If his kidney function does not return to normal I will have to adjust his HIV medications.    Bert Adams MD  [facility]fect[facility]   [facility]

## 2024-04-25 NOTE — TELEPHONE ENCOUNTER
Informed patient of results and recommendations. Stated understanding and requested to have sister called with results. Informed sister per request. Stated understanding.

## 2024-04-25 NOTE — PROGRESS NOTES
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 Last HCV viral load undetected 6/2023. Syphilis Ab reactive, RPR nonreactive 1/2024. Hepatitis C co-infection in which he was treated and cured (1/2020 with Mavyret x 8 weeks - F1-F2 disease). The patient is currently taking  Descovy and Tivicay  and endorses 100% compliance.  He denies subjective complaints such as fever, chills, nausea, vomiting, abdominal pain. He claims to have chronic intermittent erectile dysfunction for which he is medicated with Cialis PRN. His last sexual activity was prior to last visit with our clinic, he has the same female partner, he says he uses condom consistently.     Previous smoking history 30 years consuming 1 pack for 3-4 days, denies smoking at this time, denies alcohol use in the last 2-3 months, denies active drug use.    HIV history:  Diagnosed 2017 - CD4 60 and VL 25,000 at diagnosis   Risk factor - MSF  History OI - Cryptococal meningitis  History STIs - Hep c, chlamydia  Prior ART:                 - Descovy/Tivicay 2017- P  Genotype testing: wildtype              -NRTI resistance mutations: none              -NNRTI resistance mutations: none              -PI resistance mutations: none  HLA- testing negative  G6PD testing unknown    Health Screening:    The following items were screened for at today's visit:  [x] Substance Abuse  [x] Alcohol Abuse  [] Depression  [] Employment  [x] Housing Situation/Homelessness  [] Travel  [] TB Exposure   [x] Domestic Abuse   [x] Smoking Cessation    Labs  HIV Viral Load: check q3-6 months  HIV VL: Undetectable, Date: 1/25/24      CD4 Count: check q3-6 months  Lab Results   Component Value Date    WBCABSOLUTE 6,020 01/25/2024    LYMPHPERCENT 35 01/25/2024  "   LYMPHABSOLUT 2,107 01/25/2024    QU9NBZYIUBP 818 01/25/2024    CD4PCT 38.8 01/25/2024    TCELLINTERP  01/25/2024     Normal absolute lymphocyte count with normal absolute CD4+ lymphocyte count.    Hoang Diane M.D.     CBC with Differential: check q3-6 months  Lab Results   Component Value Date    WBC 6.02 01/25/2024    RBC 4.13 (L) 01/25/2024    HGB 12.8 (L) 01/25/2024    HCT 39.9 (L) 01/25/2024    MCV 96.6 (H) 01/25/2024    MCH 31.0 01/25/2024    MCHC 32.1 (L) 01/25/2024    RDW 14.1 01/25/2024     01/25/2024    MPV 11.2 (H) 01/25/2024     CMP: check q3-6 months  Lab Results   Component Value Date     03/26/2024    K 4.8 03/26/2024    GLUCOSE 103 03/26/2024    BUN 28.2 (H) 03/26/2024    CREATININE 2.73 (H) 03/26/2024    CALCIUM 9.9 03/26/2024    BILITOT 0.2 03/26/2024    AST 23 03/26/2024    ALT 24 03/26/2024    ALKPHOS 96 03/26/2024    ALBUMIN 3.9 03/26/2024     Quantiferon: check q1yr  TB Gold: Not Detected, Date: 6/12/23    Syphilis IgG: check q1yr, unless Hx syphilis then RPR titer q1yr  Lab Results   Component Value Date    SYPHAB Reactive (A) 01/25/2024    LABRPR Non-Reactive 01/25/2024     Toxoplasmosis IgG: check once  No results found for: "TXIGMS", "TOXIGGS", "TOXIGGVL"  Gonorrhea: check q1yr  Lab Results   Component Value Date    NGONNO Not Detected 01/25/2024    LABCHLAPCR Not Detected 01/25/2024     Lipid Panel: check q1yr  Lab Results   Component Value Date    TRIG 152 (H) 08/05/2021    CHOL 160 08/05/2021    HDL 62 (H) 08/05/2021    VLDL 30 08/05/2021     A1C: check q1yr  Lab Results   Component Value Date    HGBA1C 5.6 01/15/2021     Urinalysis: check q1yr, check q6mo if taking tenofovir  Lab Results   Component Value Date    PROTEINUA 1+ (A) 01/25/2024    UROBILINOGEN Normal 01/25/2024    KETONESU Negative 01/20/2022    NITRITE Negative 01/20/2022    LEUKOCYTESUR 250 (A) 01/25/2024    COLORU Light-Yellow 01/20/2022    RBCUA 0-5 01/25/2024     Glucose-6 Phosphate Dehydrogenase: " "check once  No results found for: "P3VKRYY"  Hepatitis A IgG: check once  Lab Results   Component Value Date    HEPAIGG Reactive (A) 08/15/2022     Hepatitis B Surface Antibody: check once  Lab Results   Component Value Date    HEPBSAB Reactive (A) 08/15/2022    HBSABQUANT 24.66 08/15/2022     Hepatitis C Antibody: check q1yr if high risk, once if low risk  Lab Results   Component Value Date    HEPCAB Reactive (A) 08/05/2021         Vaccines  Pneumovax-23: if CD4>200 give twice q5yrs apart before age 65, then once at 65, give >8wks from last Prevnar  Prevnar-13: if CD4>200 give once, give >1yr from last Pneumovax-23  Influenza vaccine: q1yr  HPV: Gardasil-9 at 0, 2, and 6 months for ages 15-45  Tdap: if age >35 give Td q10yrs, replace with Tdap once  MenACWY: 2 dose primary series 8 weeks apart, then 1 dose booster q5yr  Shingrix: All HIV above >17 y/o receive 2 dose series, timed 4 weeks to 6 months apart  Immunization History   Administered Date(s) Administered    COVID-19 MRNA, LN-S PF (MODERNA HALF 0.25 ML DOSE) 01/17/2022    COVID-19 Vaccine 03/19/2021, 04/16/2021, 01/17/2022    COVID-19, MRNA, LN-S, PF (MODERNA FULL 0.5 ML DOSE) 03/19/2021, 04/16/2021    Influenza 10/08/2020    Influenza (FLUAD) - Quadrivalent - Adjuvanted - PF *Preferred* (65+) 01/11/2023, 09/25/2023    Influenza - High Dose - PF (65 years and older) 10/08/2020    Influenza - Quadrivalent - High Dose - PF (65 years and older) 01/20/2022    Influenza - Quadrivalent - PF (6-35 months) 12/13/2018, 01/21/2020    Influenza - Quadrivalent - PF *Preferred* (6 months and older) 10/08/2020    Influenza - Trivalent (ADULT) 12/27/2005, 01/19/2011    Influenza - Trivalent - PF (ADULT) 12/27/2005, 01/19/2011, 10/08/2020    Meningococcal Conjugate (MCV4O) 1 Vial Dose(10yr-55yr) 01/21/2020, 01/20/2022    Pneumococcal Conjugate - 13 Valent 12/13/2018    Pneumococcal Conjugate - 20 Valent 04/25/2024    Pneumococcal Polysaccharide - 23 Valent 07/12/2018, " "03/28/2019    Td (ADULT) 02/01/2021, 08/22/2022    Tdap 01/21/2020, 11/13/2021    Zoster Recombinant 01/20/2022, 06/12/2023       Imaging  DEXA Scan: if age>50, check q10yrs  No results found for this or any previous visit.    Pathology:  Pap Smear: if female and age >21 check cervical pap q1yr, if male and MSM check rectal pap q1yr  No results found for: "PAP"    ASCVD Risk Score:  Consider high-intensity statin therapy if 10-year ASCVD risk score >7.5%  The 10-year ASCVD risk score (Magdi EVANS, et al., 2019) is: 15.5%    Values used to calculate the score:      Age: 68 years      Sex: Male      Is Non- : No      Diabetic: No      Tobacco smoker: Yes      Systolic Blood Pressure: 115 mmHg      Is BP treated: Yes      HDL Cholesterol: 62 mg/dL      Total Cholesterol: 160 mg/dL    Review of Systems   Constitutional: Negative.  Negative for chills, fever, malaise/fatigue and weight loss.   HENT: Negative.  Negative for congestion and sore throat.    Eyes: Negative.  Negative for blurred vision.   Respiratory: Negative.  Negative for cough, sputum production and shortness of breath.    Cardiovascular: Negative.  Negative for chest pain, palpitations and leg swelling.   Gastrointestinal: Negative.  Negative for abdominal pain, diarrhea, nausea and vomiting.   Genitourinary: Negative.  Negative for dysuria.   Musculoskeletal: Negative.  Negative for joint pain.   Skin: Negative.  Negative for rash.   Neurological: Negative.  Negative for focal weakness, weakness and headaches.   Endo/Heme/Allergies: Negative.    Psychiatric/Behavioral: Negative.         The following portions of the patient's history were reviewed and updated as appropriate: allergies, current medications, past family history, past medical history, past social history, past surgical history and problem list.    Objective:   Vitals:  /72 (BP Location: Left arm, Patient Position: Sitting, BP Method: Medium (Automatic))   Pulse " "101   Temp 98.1 °F (36.7 °C) (Oral)   Resp 16   Ht 5' 7" (1.702 m)   Wt 97.5 kg (214 lb 15.2 oz)   BMI 33.67 kg/m²  Body mass index is 33.67 kg/m².    Gen: awake, alert, NAD  HEENT: NC/AT, EOMi, anicteric sclera, no rhinorrhea, OP clear  Neck: no cervical LAD  CV: regular rate normal rhythm no murmur  Resp: easy WOB on RA CTABL no w/r/r  Abd: +BS, soft, NTTP, no HSM  Ext: warm, no LE edema  Skin: no rash  Neuro: no focal deficits       Assessment      1) HIV disease, most recent CD4  (last Cd4 818 (38.8%), VL undetectable)   2) History of syphilis  3) History of HCV infection  4) History of cryptococcal meningitis  5) History of chlamydia  6) Erectile dysfunction     Plan     - Order repeat labs today (CBC, CMP, Cd4, VL)  - Ordering Syphilis, UA  - Checking yearly quantiferon gold. Next due: 6/2024. Will check at next visit  - Continue Descovy and Tivicay. Refills given  - Educated on importance of 100% medication compliance  - Educated on need for barrier contraception   - Reviewed vaccinations today. Due for pneumococcal vaccine, will administer prevnar 20  - DXA scan ordered after previous visit, pt should call to schedule.  - Previously prescribed Cialis 7.5 mg OD PRN for erectile dysfunction, pt did not  prescription.     RTC 3 months    Bert Adams MD  Infectious Diseases [facility]   of Medicine        "

## 2024-04-25 NOTE — PROGRESS NOTES
Please let the patient know that kidney function is worsening. Please ask him to stop losartan and increase fluid intake. He was supposed to do a kidney US but it doesn't look like it was ever completed, he needs to schedule and have it done.   Thank you

## 2024-04-25 NOTE — PROGRESS NOTES
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 Last HCV viral load undetected 6/2023. Syphilis Ab reactive, RPR nonreactive 1/2024. Hepatitis C co-infection in which he was treated and cured (1/2020 with Mavyret x 8 weeks - F1-F2 disease). The patient is currently taking  Descovy and Tivicay  and endorses 100% compliance.  He denies subjective complaints such as fever, chills, nausea, vomiting, abdominal pain. He claims to have chronic intermittent erectile dysfunction for which he is medicated with Cialis PRN. His last sexual activity was prior to last visit with our clinic, he has the same female partner, he says he uses condom consistently.     Previous smoking history 30 years consuming 1 pack for 3-4 days, denies smoking at this time, denies alcohol use in the last 2-3 months, denies active drug use.    HIV history:  Diagnosed 2017 - CD4 60 and VL 25,000 at diagnosis   Risk factor - MSF  History OI - Cryptococal meningitis  History STIs - Hep c, chlamydia  Prior ART:                 - None  Genotype testing: wildtype              -NRTI resistance mutations: none              -NNRTI resistance mutations: none              -PI resistance mutations: none  HLA- testing negative  G6PD testing unknown    Health Screening:    The following items were screened for at today's visit:  [x] Substance Abuse  [x] Alcohol Abuse  [] Depression  [] Employment  [x] Housing Situation/Homelessness  [] Travel  [] TB Exposure   [x] Domestic Abuse   [x] Smoking Cessation    Labs  HIV Viral Load: check q3-6 months  HIV VL: Undetectable, Date: 1/25/24      CD4 Count: check q3-6 months  Lab Results   Component Value Date    WBCABSOLUTE 6,020 01/25/2024    LYMPHPERCENT 35 01/25/2024    LYMPHABSOLUT  "2,107 01/25/2024    IV3MZOYCSTR 818 01/25/2024    CD4PCT 38.8 01/25/2024    TCELLINTERP  01/25/2024     Normal absolute lymphocyte count with normal absolute CD4+ lymphocyte count.    Hoang Diane M.D.     CBC with Differential: check q3-6 months  Lab Results   Component Value Date    WBC 6.02 01/25/2024    RBC 4.13 (L) 01/25/2024    HGB 12.8 (L) 01/25/2024    HCT 39.9 (L) 01/25/2024    MCV 96.6 (H) 01/25/2024    MCH 31.0 01/25/2024    MCHC 32.1 (L) 01/25/2024    RDW 14.1 01/25/2024     01/25/2024    MPV 11.2 (H) 01/25/2024     CMP: check q3-6 months  Lab Results   Component Value Date     03/26/2024    K 4.8 03/26/2024    GLUCOSE 103 03/26/2024    BUN 28.2 (H) 03/26/2024    CREATININE 2.73 (H) 03/26/2024    CALCIUM 9.9 03/26/2024    BILITOT 0.2 03/26/2024    AST 23 03/26/2024    ALT 24 03/26/2024    ALKPHOS 96 03/26/2024    ALBUMIN 3.9 03/26/2024     Quantiferon: check q1yr  TB Gold: Not Detected, Date: 6/12/23    Syphilis IgG: check q1yr, unless Hx syphilis then RPR titer q1yr  Lab Results   Component Value Date    SYPHAB Reactive (A) 01/25/2024    LABRPR Non-Reactive 01/25/2024     Toxoplasmosis IgG: check once  No results found for: "TXIGMS", "TOXIGGS", "TOXIGGVL"  Gonorrhea: check q1yr  Lab Results   Component Value Date    NGONNO Not Detected 01/25/2024    LABCHLAPCR Not Detected 01/25/2024     Lipid Panel: check q1yr  Lab Results   Component Value Date    TRIG 152 (H) 08/05/2021    CHOL 160 08/05/2021    HDL 62 (H) 08/05/2021    VLDL 30 08/05/2021     A1C: check q1yr  Lab Results   Component Value Date    HGBA1C 5.6 01/15/2021     Urinalysis: check q1yr, check q6mo if taking tenofovir  Lab Results   Component Value Date    PROTEINUA 1+ (A) 01/25/2024    UROBILINOGEN Normal 01/25/2024    KETONESU Negative 01/20/2022    NITRITE Negative 01/20/2022    LEUKOCYTESUR 250 (A) 01/25/2024    COLORU Light-Yellow 01/20/2022    RBCUA 0-5 01/25/2024     Glucose-6 Phosphate Dehydrogenase: check once  No " "results found for: "E2IKJVY"  Hepatitis A IgG: check once  Lab Results   Component Value Date    HEPAIGG Reactive (A) 08/15/2022     Hepatitis B Surface Antibody: check once  Lab Results   Component Value Date    HEPBSAB Reactive (A) 08/15/2022    HBSABQUANT 24.66 08/15/2022     Hepatitis C Antibody: check q1yr if high risk, once if low risk  Lab Results   Component Value Date    HEPCAB Reactive (A) 08/05/2021         Vaccines  Pneumovax-23: if CD4>200 give twice q5yrs apart before age 65, then once at 65, give >8wks from last Prevnar  Prevnar-13: if CD4>200 give once, give >1yr from last Pneumovax-23  Influenza vaccine: q1yr  HPV: Gardasil-9 at 0, 2, and 6 months for ages 15-45  Tdap: if age >35 give Td q10yrs, replace with Tdap once  MenACWY: 2 dose primary series 8 weeks apart, then 1 dose booster q5yr  Shingrix: All HIV above >17 y/o receive 2 dose series, timed 4 weeks to 6 months apart  Immunization History   Administered Date(s) Administered    COVID-19 MRNA, LN-S PF (MODERNA HALF 0.25 ML DOSE) 01/17/2022    COVID-19 Vaccine 03/19/2021, 04/16/2021, 01/17/2022    COVID-19, MRNA, LN-S, PF (MODERNA FULL 0.5 ML DOSE) 03/19/2021, 04/16/2021    Influenza 10/08/2020    Influenza (FLUAD) - Quadrivalent - Adjuvanted - PF *Preferred* (65+) 01/11/2023, 09/25/2023    Influenza - High Dose - PF (65 years and older) 10/08/2020    Influenza - Quadrivalent - High Dose - PF (65 years and older) 01/20/2022    Influenza - Quadrivalent - PF (6-35 months) 12/13/2018, 01/21/2020    Influenza - Quadrivalent - PF *Preferred* (6 months and older) 10/08/2020    Influenza - Trivalent (ADULT) 12/27/2005, 01/19/2011    Influenza - Trivalent - PF (ADULT) 12/27/2005, 01/19/2011, 10/08/2020    Meningococcal Conjugate (MCV4O) 1 Vial Dose(10yr-55yr) 01/21/2020, 01/20/2022    Pneumococcal Conjugate - 13 Valent 12/13/2018    Pneumococcal Polysaccharide - 23 Valent 07/12/2018, 03/28/2019    Td (ADULT) 02/01/2021, 08/22/2022    Tdap 01/21/2020, " "11/13/2021    Zoster Recombinant 01/20/2022, 06/12/2023       Imaging  DEXA Scan: if age>50, check q10yrs  No results found for this or any previous visit.    Pathology:  Pap Smear: if female and age >21 check cervical pap q1yr, if male and MSM check rectal pap q1yr  No results found for: "PAP"    ASCVD Risk Score:  Consider high-intensity statin therapy if 10-year ASCVD risk score >7.5%  The 10-year ASCVD risk score (Magdi EVANS, et al., 2019) is: 15.5%    Values used to calculate the score:      Age: 68 years      Sex: Male      Is Non- : No      Diabetic: No      Tobacco smoker: Yes      Systolic Blood Pressure: 115 mmHg      Is BP treated: Yes      HDL Cholesterol: 62 mg/dL      Total Cholesterol: 160 mg/dL    Review of Systems   Constitutional: Negative.    HENT: Negative.     Eyes: Negative.    Respiratory: Negative.     Cardiovascular: Negative.    Gastrointestinal: Negative.    Genitourinary: Negative.    Musculoskeletal: Negative.    Skin: Negative.    Neurological: Negative.    Endo/Heme/Allergies: Negative.    Psychiatric/Behavioral: Negative.       The following portions of the patient's history were reviewed and updated as appropriate: allergies, current medications, past family history, past medical history, past social history, past surgical history and problem list.    Objective:   Vitals:  /72 (BP Location: Left arm, Patient Position: Sitting, BP Method: Medium (Automatic))   Pulse 101   Temp 98.1 °F (36.7 °C) (Oral)   Resp 16   Ht 5' 7" (1.702 m)   Wt 97.5 kg (214 lb 15.2 oz)   BMI 33.67 kg/m²  Body mass index is 33.67 kg/m².    Gen: awake, alert, NAD  HEENT: NC/AT, EOMi, anicteric sclera, no rhinorrhea, OP clear  Neck: no cervical LAD  CV: regular rate normal rhythm no murmur  Resp: easy WOB on RA CTABL no w/r/r  Abd: +BS, soft, NTTP, no HSM  Ext: warm, no LE edema  Skin: no rash  Neuro: no focal deficits       Assessment      1) HIV disease, most recent CD4  " (last Cd4 818 (38.8%), VL undetectable) 1/2024  2) History of syphilis  3) History of HCV infection  4) History of cryptococcal meningitis  5) History of chlamydia  6) Erectile dysfunction     Plan     - Order repeat labs today (CBC, CMP, Cd4, VL)  - Ordering Syphilis, UA  - Checking yearly quantiferon gold. Next due: 6/2024. Will check at next visit  - Continue Descovy and Tivicay. Refills given  - Educated on importance of 100% medication compliance  - Educated on need for barrier contraception   - Reviewed vaccinations today. Due for pneumococcal vaccine, will administer prevnar 20  - DXA scan ordered after previous visit, pt should call to schedule.  - Previously prescribed Cialis 7.5 mg OD PRN for erectile dysfunction, pt did not  prescription.     RTC 3 months    Yon Cornelius   MS3

## 2024-04-26 LAB
CD3 CELLS # BLD: 1987 CELLS/MCL (ref 550–2202)
CD3 CELLS NFR BLD: 76 % (ref 58–86)
CD3+CD4+ CELLS # BLD: 848 CELLS/MCL (ref 365–1437)
CD3+CD4+ CELLS NFR BLD: 33 % (ref 32–64)
CD3+CD4+ CELLS/CD3+CD8+ CLL BLD: 0.7 %
CD3+CD8+ CELLS # BLD: 1144 CELLS/MCL (ref 80–846)
CD3+CD8+ CELLS NFR BLD: 44 % (ref 8–40)
CD45 CELLS # BLD: 2.6 THOU/MCL (ref 0.82–2.84)
HCV RNA SERPL NAA+PROBE-ACNC: NORMAL IU/ML
HIV1 RNA # PLAS NAA DL=20: NORMAL COPIES/ML
T PALLIDUM AB SER QL AGGL: POSITIVE

## 2024-04-30 ENCOUNTER — TELEPHONE (OUTPATIENT)
Dept: NEPHROLOGY | Facility: CLINIC | Age: 69
End: 2024-04-30
Payer: MEDICARE

## 2024-04-30 NOTE — TELEPHONE ENCOUNTER
----- Message from Irlanda Kc sent at 4/30/2024 12:06 PM CDT -----  Regarding: ultrasoundf  Pt sister Malena Barrios called to see if pt order for ultrasound can be sent to [facility].. Please advise  [phone]

## 2024-04-30 NOTE — TELEPHONE ENCOUNTER
Pt's sister informed:  U/S scheduled 5/10/24@9AM to arrive @8:30 at [facility]; NPO after midnight the night before; states understanding

## 2024-05-20 DIAGNOSIS — N18.9 CHRONIC KIDNEY DISEASE, UNSPECIFIED CKD STAGE: Primary | ICD-10-CM

## 2024-07-01 DIAGNOSIS — N18.32 STAGE 3B CHRONIC KIDNEY DISEASE: Primary | ICD-10-CM

## 2024-07-01 DIAGNOSIS — N52.9 ERECTILE DYSFUNCTION, UNSPECIFIED ERECTILE DYSFUNCTION TYPE: ICD-10-CM

## 2024-07-01 RX ORDER — TADALAFIL 5 MG/1
7.5 TABLET ORAL DAILY PRN
Qty: 20 TABLET | Refills: 0 | Status: SHIPPED | OUTPATIENT
Start: 2024-07-01 | End: 2025-07-01

## 2024-07-01 NOTE — TELEPHONE ENCOUNTER
Last visit with Bert Adams MD: Visit date not found  Last visit in [facility] INFECTIOUS DISEASE: 4/25/2024    Patient's next visit in [facility] INFECTIOUS DISEASE: 7/25/2024

## 2024-07-16 DIAGNOSIS — N18.9 CHRONIC KIDNEY DISEASE, UNSPECIFIED CKD STAGE: Primary | ICD-10-CM

## 2024-07-23 ENCOUNTER — OFFICE VISIT (OUTPATIENT)
Dept: NEPHROLOGY | Facility: CLINIC | Age: 69
End: 2024-07-23
Payer: MEDICARE

## 2024-07-23 VITALS
HEART RATE: 96 BPM | TEMPERATURE: 98 F | SYSTOLIC BLOOD PRESSURE: 133 MMHG | HEIGHT: 67 IN | RESPIRATION RATE: 20 BRPM | OXYGEN SATURATION: 97 % | BODY MASS INDEX: 33.8 KG/M2 | DIASTOLIC BLOOD PRESSURE: 79 MMHG | WEIGHT: 215.38 LBS

## 2024-07-23 DIAGNOSIS — N18.32 CKD STAGE G3B/A1, GFR 30-44 AND ALBUMIN CREATININE RATIO <30 MG/G: Primary | ICD-10-CM

## 2024-07-23 DIAGNOSIS — I10 ESSENTIAL HYPERTENSION: ICD-10-CM

## 2024-07-23 PROCEDURE — 3075F SYST BP GE 130 - 139MM HG: CPT | Mod: CPTII,,, | Performed by: NURSE PRACTITIONER

## 2024-07-23 PROCEDURE — 3078F DIAST BP <80 MM HG: CPT | Mod: CPTII,,, | Performed by: NURSE PRACTITIONER

## 2024-07-23 PROCEDURE — 1125F AMNT PAIN NOTED PAIN PRSNT: CPT | Mod: CPTII,,, | Performed by: NURSE PRACTITIONER

## 2024-07-23 PROCEDURE — 99215 OFFICE O/P EST HI 40 MIN: CPT | Mod: PBBFAC | Performed by: NURSE PRACTITIONER

## 2024-07-23 PROCEDURE — 99214 OFFICE O/P EST MOD 30 MIN: CPT | Mod: S$PBB,,, | Performed by: NURSE PRACTITIONER

## 2024-07-23 PROCEDURE — 3066F NEPHROPATHY DOC TX: CPT | Mod: CPTII,,, | Performed by: NURSE PRACTITIONER

## 2024-07-23 PROCEDURE — 3288F FALL RISK ASSESSMENT DOCD: CPT | Mod: CPTII,,, | Performed by: NURSE PRACTITIONER

## 2024-07-23 PROCEDURE — 4010F ACE/ARB THERAPY RXD/TAKEN: CPT | Mod: CPTII,,, | Performed by: NURSE PRACTITIONER

## 2024-07-23 PROCEDURE — 1159F MED LIST DOCD IN RCRD: CPT | Mod: CPTII,,, | Performed by: NURSE PRACTITIONER

## 2024-07-23 PROCEDURE — 1101F PT FALLS ASSESS-DOCD LE1/YR: CPT | Mod: CPTII,,, | Performed by: NURSE PRACTITIONER

## 2024-07-23 PROCEDURE — 3008F BODY MASS INDEX DOCD: CPT | Mod: CPTII,,, | Performed by: NURSE PRACTITIONER

## 2024-07-23 PROCEDURE — 1160F RVW MEDS BY RX/DR IN RCRD: CPT | Mod: CPTII,,, | Performed by: NURSE PRACTITIONER

## 2024-07-23 NOTE — PROGRESS NOTES
"[facility]  Nephrology Clinic Note    Chief complaint: Chronic Kidney Disease (Follow up)    History of present illness:   Renetta Glover Jr. is a 68 y.o. Other male with past medical history of chronic kidney disease, HIV, treated hepatitis C, syphilis, arthritis, nephrolithiasis, past history of polysubstance abuse, and hypertension. Presents for follow-up appointment in nephrology clinic.    Denies complaints.    Review of Systems  12 point review of systems conducted, negative except as stated in the history of present illness.    Allergies: Patient has No Known Allergies.     Past Medical History:  has a past medical history of Disorder of kidney and ureter, GERD (gastroesophageal reflux disease), History of cryptococcal meningitis, History of syphilis, Human immunodeficiency virus (HIV) disease, and Hypertension.    Procedure History:  has a past surgical history that includes Hip replacement arthroplasty (Right, 02/02/2021); Esophagogastroduodenoscopy (N/A, 06/20/2022); robotic arthroplasty, hip (Left, 08/18/2022); and Colonoscopy.    Family History: family history includes Diabetes in his brother and mother.    Social History:  reports that he has been smoking cigarettes. He started smoking about 48 years ago. He has a 12.1 pack-year smoking history. He uses smokeless tobacco. He reports current alcohol use of about 6.0 standard drinks of alcohol per week. He reports current drug use. Drug: "Crack" cocaine.    Physical exam  /79 (BP Location: Left arm, Patient Position: Sitting, BP Method: Medium (Automatic))   Pulse 96   Temp 98.2 °F (36.8 °C) (Oral)   Resp 20   Ht 5' 7" (1.702 m)   Wt 97.7 kg (215 lb 6.2 oz)   SpO2 97%   BMI 33.73 kg/m²   General appearance: Patient is in no acute distress.  Skin: No rashes or wounds.  HEENT: PERRLA, EOMI, no scleral icterus, no JVD. Neck is supple.  Chest: Respirations are unlabored. Lungs sounds are clear.   Heart: S1, S2. "   Abdomen: Benign.  : Deferred.  Extremities: No edema, peripheral pulses are palpable.   Neuro: No focal deficits.     Home Medications:    Current Outpatient Medications:     amitriptyline (ELAVIL) 75 MG tablet, Take 75 mg by mouth every evening., Disp: , Rfl:     amLODIPine (NORVASC) 10 MG tablet, Take 1 tablet (10 mg total) by mouth once daily., Disp: 90 tablet, Rfl: 1    aspirin (ECOTRIN) 81 MG EC tablet, Take 1 tablet (81 mg total) by mouth 2 (two) times a day. Increase to twice a day for 6 weeks and then restart daily dose on 10/4/22, Disp: 84 tablet, Rfl: 0    dolutegravir (TIVICAY) 50 mg Tab, Take 1 tablet (50 mg total) by mouth once daily., Disp: 30 tablet, Rfl: 3    emtricitabine-tenofovir alafen (DESCOVY) 200-25 mg Tab, Take 1 tablet by mouth Daily., Disp: 30 tablet, Rfl: 3    levothyroxine (SYNTHROID) 75 MCG tablet, Take 1 tablet (75 mcg total) by mouth before breakfast. For thyroid, Disp: 90 tablet, Rfl: 0    omeprazole (PRILOSEC) 20 MG capsule, Take 1 capsule (20 mg total) by mouth once daily. For reflux, Disp: 90 capsule, Rfl: 1    tadalafiL (CIALIS) 5 MG tablet, Take 1.5 tablets (7.5 mg total) by mouth daily as needed for Erectile Dysfunction., Disp: 20 tablet, Rfl: 0    Laboratory data    07/18/2024: Sodium 140, potassium 4.4, chloride 107, CO2 25, alkaline phosphatase 97, AST 23, ALT 34, BUN 25, creatinine 2.27, glucose 95, GFR 37, calcium 9.6, albumin 4.6, bilirubin 0.3, urine protein to creatinine ratio 143 mg/g, urinalysis unremarkable except for moderate amount of leukocyte esterase.    Imaging  Kidney ultrasound 05/10/2024: Right kidney measures 9.2 x 5 by 4.3 cm.  Left kidney measures 8.7 x 5.3 x 4.5 cm.  No solid mass, hydronephrosis, or cyst formation identified.  The bladder is not imaged.  Impression: No sonographic abnormality of the kidneys.    Impression    ICD-10-CM ICD-9-CM   1. CKD stage G3b/A1, GFR 30-44 and albumin creatinine ratio <30 mg/g  N18.32 585.3   2. Essential  hypertension  I10 401.9   3. BMI 33.0-33.9,adult  Z68.33 V85.33        Plan  CKD stage G3b/A1, GFR 30-44 and albumin creatinine ratio <30 mg/g  -     Comprehensive Metabolic Panel; Future; Expected date: 11/16/2024  -     CBC Auto Differential; Future; Expected date: 11/16/2024  -     Protein/Creatinine Ratio, Urine; Future; Expected date: 11/16/2024  -     Urinalysis, Reflex to Urine Culture; Future; Expected date: 11/16/2024  -     PTH, Intact; Future; Expected date: 11/16/2024  Possibly hypertensive kidney disease.  There is no significant proteinuria.  Acute kidney injury has resolved after discontinuation of angiotensin receptor blocker.  Kidney ultrasound was unremarkable.  Continue risk factor management and periodic monitoring.      Essential hypertension  Blood pressure reading is at goal, continue current antihypertensive regimen and 2 g a day dietary sodium restriction.      BMI 33.0-33.9,adult  Lifestyle and dietary interventions discussed, patient encouraged to maintain non-sedentary lifestyle and well-balanced diet.       Follow up in about 4 months (around 11/23/2024).     7/23/2024  Alison Lemon NP  [facility]

## 2024-07-23 NOTE — LETTER
July 23, 2024    Renetta Glover Jr.  [address]  [address]  [place] [address]             [facility]  [address] [address]  Phone: [phone] To whom it May concern:    Please consider excusing Mr. Glover from jury duty as he has multiple health conditions: Chronic kidney disease, hypertension, and arthritis.    If you have any questions or concerns, please don't hesitate to call.    Sincerely,        Alison Lemon, CRISTIANOP

## 2024-08-12 DIAGNOSIS — N52.9 ERECTILE DYSFUNCTION, UNSPECIFIED ERECTILE DYSFUNCTION TYPE: ICD-10-CM

## 2024-08-12 RX ORDER — TADALAFIL 5 MG/1
7.5 TABLET ORAL DAILY PRN
Qty: 20 TABLET | Refills: 0 | Status: SHIPPED | OUTPATIENT
Start: 2024-08-12 | End: 2025-08-12

## 2024-08-12 NOTE — TELEPHONE ENCOUNTER
Last visit with Bert Adams MD: Visit date not found  Last visit in [facility] INFECTIOUS DISEASE: 4/25/2024    Patient's next visit in [facility] INFECTIOUS DISEASE: 9/10/2024

## 2024-09-10 ENCOUNTER — LAB VISIT (OUTPATIENT)
Dept: LAB | Facility: HOSPITAL | Age: 69
End: 2024-09-10
Payer: MEDICARE

## 2024-09-10 ENCOUNTER — TELEPHONE (OUTPATIENT)
Dept: INFECTIOUS DISEASES | Facility: HOSPITAL | Age: 69
End: 2024-09-10
Payer: MEDICARE

## 2024-09-10 ENCOUNTER — OFFICE VISIT (OUTPATIENT)
Dept: INFECTIOUS DISEASES | Facility: CLINIC | Age: 69
End: 2024-09-10
Payer: MEDICARE

## 2024-09-10 VITALS
BODY MASS INDEX: 33.49 KG/M2 | DIASTOLIC BLOOD PRESSURE: 72 MMHG | RESPIRATION RATE: 21 BRPM | HEART RATE: 90 BPM | HEIGHT: 67 IN | WEIGHT: 213.38 LBS | TEMPERATURE: 98 F | SYSTOLIC BLOOD PRESSURE: 124 MMHG

## 2024-09-10 DIAGNOSIS — K21.9 GASTROESOPHAGEAL REFLUX DISEASE, UNSPECIFIED WHETHER ESOPHAGITIS PRESENT: ICD-10-CM

## 2024-09-10 DIAGNOSIS — E03.9 HYPOTHYROIDISM, UNSPECIFIED TYPE: ICD-10-CM

## 2024-09-10 DIAGNOSIS — N52.9 ERECTILE DYSFUNCTION, UNSPECIFIED ERECTILE DYSFUNCTION TYPE: ICD-10-CM

## 2024-09-10 DIAGNOSIS — Z00.00 HEALTH CARE MAINTENANCE: ICD-10-CM

## 2024-09-10 DIAGNOSIS — B20 HIV DISEASE: ICD-10-CM

## 2024-09-10 DIAGNOSIS — N18.32 STAGE 3B CHRONIC KIDNEY DISEASE: ICD-10-CM

## 2024-09-10 DIAGNOSIS — B20 HIV DISEASE: Primary | ICD-10-CM

## 2024-09-10 LAB
ALBUMIN SERPL-MCNC: 3.7 G/DL (ref 3.4–4.8)
ALBUMIN/GLOB SERPL: 0.8 RATIO (ref 1.1–2)
ALP SERPL-CCNC: 104 UNIT/L (ref 40–150)
ALT SERPL-CCNC: 19 UNIT/L (ref 0–55)
ANION GAP SERPL CALC-SCNC: 8 MEQ/L
AST SERPL-CCNC: 25 UNIT/L (ref 5–34)
BACTERIA #/AREA URNS AUTO: ABNORMAL /HPF
BASOPHILS # BLD AUTO: 0.05 X10(3)/MCL
BASOPHILS NFR BLD AUTO: 0.8 %
BILIRUB SERPL-MCNC: 0.3 MG/DL
BILIRUB UR QL STRIP.AUTO: NEGATIVE
BUN SERPL-MCNC: 23.6 MG/DL (ref 8.4–25.7)
CALCIUM SERPL-MCNC: 9.4 MG/DL (ref 8.8–10)
CHLORIDE SERPL-SCNC: 108 MMOL/L (ref 98–107)
CLARITY UR: CLEAR
CO2 SERPL-SCNC: 24 MMOL/L (ref 23–31)
COLOR UR AUTO: ABNORMAL
CREAT SERPL-MCNC: 1.97 MG/DL (ref 0.73–1.18)
CREAT/UREA NIT SERPL: 12
EOSINOPHIL # BLD AUTO: 0.35 X10(3)/MCL (ref 0–0.9)
EOSINOPHIL NFR BLD AUTO: 5.5 %
ERYTHROCYTE [DISTWIDTH] IN BLOOD BY AUTOMATED COUNT: 13.9 % (ref 11.5–17)
GFR SERPLBLD CREATININE-BSD FMLA CKD-EPI: 36 ML/MIN/1.73/M2
GLOBULIN SER-MCNC: 4.4 GM/DL (ref 2.4–3.5)
GLUCOSE SERPL-MCNC: 101 MG/DL (ref 82–115)
GLUCOSE UR QL STRIP: NORMAL
HCT VFR BLD AUTO: 38.3 % (ref 42–52)
HGB BLD-MCNC: 13.1 G/DL (ref 14–18)
HGB UR QL STRIP: NEGATIVE
HYALINE CASTS #/AREA URNS LPF: ABNORMAL /LPF
IMM GRANULOCYTES # BLD AUTO: 0.04 X10(3)/MCL (ref 0–0.04)
IMM GRANULOCYTES NFR BLD AUTO: 0.6 %
KETONES UR QL STRIP: NEGATIVE
LEUKOCYTE ESTERASE UR QL STRIP: 500
LYMPHOCYTES # BLD AUTO: 2.32 X10(3)/MCL (ref 0.6–4.6)
LYMPHOCYTES NFR BLD AUTO: 36.3 %
MCH RBC QN AUTO: 32.7 PG (ref 27–31)
MCHC RBC AUTO-ENTMCNC: 34.2 G/DL (ref 33–36)
MCV RBC AUTO: 95.5 FL (ref 80–94)
MONOCYTES # BLD AUTO: 0.72 X10(3)/MCL (ref 0.1–1.3)
MONOCYTES NFR BLD AUTO: 11.3 %
NEUTROPHILS # BLD AUTO: 2.92 X10(3)/MCL (ref 2.1–9.2)
NEUTROPHILS NFR BLD AUTO: 45.5 %
NITRITE UR QL STRIP: NEGATIVE
NRBC BLD AUTO-RTO: 0 %
PH UR STRIP: 5.5 [PH]
PLATELET # BLD AUTO: 310 X10(3)/MCL (ref 130–400)
PMV BLD AUTO: 11.3 FL (ref 7.4–10.4)
POTASSIUM SERPL-SCNC: 4.1 MMOL/L (ref 3.5–5.1)
PROT SERPL-MCNC: 8.1 GM/DL (ref 5.8–7.6)
PROT UR QL STRIP: ABNORMAL
RBC # BLD AUTO: 4.01 X10(6)/MCL (ref 4.7–6.1)
RBC #/AREA URNS AUTO: ABNORMAL /HPF
RPR SER QL: NORMAL
SODIUM SERPL-SCNC: 140 MMOL/L (ref 136–145)
SP GR UR STRIP.AUTO: 1.02 (ref 1–1.03)
SQUAMOUS #/AREA URNS LPF: ABNORMAL /HPF
T PALLIDUM AB SER QL: REACTIVE
UROBILINOGEN UR STRIP-ACNC: NORMAL
WBC # BLD AUTO: 6.4 X10(3)/MCL (ref 4.5–11.5)
WBC #/AREA URNS AUTO: ABNORMAL /HPF

## 2024-09-10 PROCEDURE — 87536 HIV-1 QUANT&REVRSE TRNSCRPJ: CPT

## 2024-09-10 PROCEDURE — 99213 OFFICE O/P EST LOW 20 MIN: CPT | Mod: PBBFAC,25

## 2024-09-10 PROCEDURE — 87086 URINE CULTURE/COLONY COUNT: CPT

## 2024-09-10 PROCEDURE — 86780 TREPONEMA PALLIDUM: CPT

## 2024-09-10 PROCEDURE — 36415 COLL VENOUS BLD VENIPUNCTURE: CPT

## 2024-09-10 PROCEDURE — 80053 COMPREHEN METABOLIC PANEL: CPT

## 2024-09-10 PROCEDURE — 86592 SYPHILIS TEST NON-TREP QUAL: CPT

## 2024-09-10 PROCEDURE — 85025 COMPLETE CBC W/AUTO DIFF WBC: CPT

## 2024-09-10 PROCEDURE — 90653 IIV ADJUVANT VACCINE IM: CPT | Mod: PBBFAC

## 2024-09-10 PROCEDURE — G0008 ADMIN INFLUENZA VIRUS VAC: HCPCS | Mod: PBBFAC

## 2024-09-10 PROCEDURE — 86359 T CELLS TOTAL COUNT: CPT

## 2024-09-10 PROCEDURE — 81001 URINALYSIS AUTO W/SCOPE: CPT

## 2024-09-10 PROCEDURE — 86360 T CELL ABSOLUTE COUNT/RATIO: CPT

## 2024-09-10 RX ORDER — DOLUTEGRAVIR SODIUM AND RILPIVIRINE HYDROCHLORIDE 50; 25 MG/1; MG/1
1 TABLET, FILM COATED ORAL DAILY
Qty: 30 TABLET | Refills: 2 | Status: SHIPPED | OUTPATIENT
Start: 2024-09-10 | End: 2024-12-09

## 2024-09-10 RX ADMIN — INFLUENZA A VIRUS A/VICTORIA/4897/2022 IVR-238 (H1N1) ANTIGEN (FORMALDEHYDE INACTIVATED), INFLUENZA A VIRUS A/THAILAND/8/2022 IVR-237 (H3N2) ANTIGEN (FORMALDEHYDE INACTIVATED), INFLUENZA B VIRUS B/AUSTRIA/1359417/2021 BVR-26 ANTIGEN (FORMALDEHYDE INACTIVATED) 0.5 ML: 15; 15; 15 INJECTION, SUSPENSION INTRAMUSCULAR at 10:09

## 2024-09-10 NOTE — PROGRESS NOTES
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 Last HCV viral load undetected 6/2023. Syphilis Ab reactive, RPR nonreactive 4/2024. Hepatitis C co-infection in which he was treated and cured (1/2020 with Mavyret x 8 weeks - F1-F2 disease). The patient is currently taking  Descovy and Tivicay and endorses 100% compliance.  He denies subjective complaints such as fever, chills, nausea, vomiting, abdominal pain. He claims to have chronic intermittent erectile dysfunction for which he is medicated with Cialis PRN. His last sexual activity was prior to last visit with our clinic, he has the same female partner, he says he uses condom consistently.     Previous smoking history 30 years consuming 1 pack for 3-4 days, denies smoking at this time, denies alcohol use in the last 2-3 months, denies active drug use.    HIV history:  Diagnosed 2017 - CD4 60 and VL 25,000 at diagnosis   Risk factor - MSF  History OI - Cryptococal meningitis  History STIs - Hep c, chlamydia  Prior ART:                 - Descovy/Tivicay 2017- P  Genotype testing: wildtype              -NRTI resistance mutations: none              -NNRTI resistance mutations: none              -PI resistance mutations: none  HLA- testing negative  G6PD testing unknown    Health Screening:    The following items were screened for at today's visit:  [x] Substance Abuse  [x] Alcohol Abuse  [] Depression  [] Employment  [x] Housing Situation/Homelessness  [] Travel  [] TB Exposure   [x] Domestic Abuse   [x] Smoking Cessation    Labs  HIV Viral Load: check q3-6 months  HIV VL: Undetectable, Date: 1/25/24      CD4 Count: check q3-6 months  Lab Results   Component Value Date    WBCABSOLUTE 6,020 01/25/2024    LYMPHPERCENT 35 01/25/2024     "LYMPHABSOLUT 2,107 01/25/2024    SP9JFGKSZBY 818 01/25/2024    TCELLINTERP  01/25/2024     Normal absolute lymphocyte count with normal absolute CD4+ lymphocyte count.    Hoang Diane M.D.     CBC with Differential: check q3-6 months  Lab Results   Component Value Date    WBC 8.90 04/25/2024    RBC 3.98 (L) 04/25/2024    HGB 13.0 (L) 04/25/2024    HCT 39.0 (L) 04/25/2024    MCV 98.0 (H) 04/25/2024    MCH 32.7 (H) 04/25/2024    MCHC 33.3 04/25/2024    RDW 13.9 04/25/2024     04/25/2024    MPV 10.6 (H) 04/25/2024     CMP: check q3-6 months  Lab Results   Component Value Date     04/25/2024    K 4.8 04/25/2024     (H) 04/25/2024    GLUCOSE 108 04/25/2024    BUN 39.3 (H) 04/25/2024    CREATININE 4.35 (H) 04/25/2024    CALCIUM 9.9 04/25/2024    BILITOT 0.3 04/25/2024    AST 21 04/25/2024    ALT 30 04/25/2024    ALKPHOS 84 04/25/2024    ALBUMIN 4.2 04/25/2024     Quantiferon: check q1yr  TB Gold: Not Detected, Date: 6/12/23    Syphilis IgG: check q1yr, unless Hx syphilis then RPR titer q1yr  Lab Results   Component Value Date    SYPHAB Reactive (A) 04/25/2024    LABRPR Non-Reactive 04/25/2024     Toxoplasmosis IgG: check once  No results found for: "TXIGMS", "TOXIGGS", "TOXIGGVL"  Gonorrhea: check q1yr  Lab Results   Component Value Date    NGONNO Not Detected 01/25/2024    LABCHLAPCR Not Detected 01/25/2024     Lipid Panel: check q1yr  Lab Results   Component Value Date    TRIG 152 (H) 08/05/2021    CHOL 160 08/05/2021    HDL 62 (H) 08/05/2021    VLDL 30 08/05/2021     A1C: check q1yr  Lab Results   Component Value Date    HGBA1C 5.6 01/15/2021     Urinalysis: check q1yr, check q6mo if taking tenofovir  Lab Results   Component Value Date    PROTEINUA Trace (A) 04/25/2024    UROBILINOGEN Normal 04/25/2024    KETONESU Negative 01/20/2022    NITRITE Negative 04/25/2024    LEUKOCYTESUR 25 (A) 04/25/2024    COLORU Light-Yellow 04/25/2024    RBCUA 0-5 04/25/2024     Glucose-6 Phosphate Dehydrogenase: " "check once  No results found for: "A8URYQN"  Hepatitis A IgG: check once  Lab Results   Component Value Date    HEPAIGG Reactive (A) 08/15/2022     Hepatitis B Surface Antibody: check once  Lab Results   Component Value Date    HEPBSAB Reactive (A) 08/15/2022    HBSABQUANT 24.66 08/15/2022     Hepatitis C Antibody: check q1yr if high risk, once if low risk  Lab Results   Component Value Date    HEPCAB Reactive (A) 08/05/2021     Vaccines  Pneumovax-23: if CD4>200 give twice q5yrs apart before age 65, then once at 65, give >8wks from last Prevnar  Prevnar-13: if CD4>200 give once, give >1yr from last Pneumovax-23  Influenza vaccine: q1yr  HPV: Gardasil-9 at 0, 2, and 6 months for ages 15-45  Tdap: if age >35 give Td q10yrs, replace with Tdap once  MenACWY: 2 dose primary series 8 weeks apart, then 1 dose booster q5yr  Shingrix: All HIV above >17 y/o receive 2 dose series, timed 4 weeks to 6 months apart    Immunization History   Administered Date(s) Administered    COVID-19 MRNA, LN-S PF (MODERNA HALF 0.25 ML DOSE) 01/17/2022    COVID-19 Vaccine 03/19/2021, 04/16/2021, 01/17/2022    COVID-19, MRNA, LN-S, PF (MODERNA FULL 0.5 ML DOSE) 03/19/2021, 04/16/2021    Influenza 10/08/2020    Influenza (FLUAD) - Quadrivalent - Adjuvanted - PF *Preferred* (65+) 01/11/2023, 09/25/2023    Influenza - High Dose - PF (65 years and older) 10/08/2020    Influenza - Quadrivalent - High Dose - PF (65 years and older) 01/20/2022    Influenza - Quadrivalent - PF (6-35 months) 12/13/2018, 01/21/2020    Influenza - Quadrivalent - PF *Preferred* (6 months and older) 10/08/2020    Influenza - Trivalent (ADULT) 12/27/2005, 01/19/2011    Influenza - Trivalent - PF (ADULT) 12/27/2005, 01/19/2011, 10/08/2020    Meningococcal Conjugate (MCV4O) 1 Vial Dose(10yr-55yr) 01/21/2020, 01/20/2022    Pneumococcal Conjugate - 13 Valent 12/13/2018    Pneumococcal Conjugate - 20 Valent 04/25/2024    Pneumococcal Polysaccharide - 23 Valent 07/12/2018, " "03/28/2019    Td (ADULT) 02/01/2021, 08/22/2022    Tdap 01/21/2020, 11/13/2021    Zoster Recombinant 01/20/2022, 06/12/2023       Imaging  DEXA Scan: if age>50, check q10yrs  No results found for this or any previous visit.    Pathology:  Pap Smear: if female and age >21 check cervical pap q1yr, if male and MSM check rectal pap q1yr  No results found for: "PAP"    ASCVD Risk Score:  Consider high-intensity statin therapy if 10-year ASCVD risk score >7.5%  The ASCVD Risk score (Magdi DK, et al., 2019) failed to calculate for the following reasons:    Cannot find a previous HDL lab    Cannot find a previous total cholesterol lab    Review of Systems   Constitutional: Negative.  Negative for chills, fever, malaise/fatigue and weight loss.   HENT: Negative.  Negative for congestion and sore throat.    Eyes: Negative.  Negative for blurred vision.   Respiratory: Negative.  Negative for cough, sputum production and shortness of breath.    Cardiovascular: Negative.  Negative for chest pain, palpitations and leg swelling.   Gastrointestinal: Negative.  Negative for abdominal pain, diarrhea, nausea and vomiting.   Genitourinary: Negative.  Negative for dysuria.   Musculoskeletal: Negative.  Negative for joint pain.   Skin: Negative.  Negative for rash.   Neurological: Negative.  Negative for focal weakness, weakness and headaches.   Endo/Heme/Allergies: Negative.    Psychiatric/Behavioral: Negative.       The following portions of the patient's history were reviewed and updated as appropriate: allergies, current medications, past family history, past medical history, past social history, past surgical history and problem list.    Objective:   Vitals:  There were no vitals taken for this visit. There is no height or weight on file to calculate BMI.    Gen: awake, alert, NAD  HEENT: NC/AT, EOMi, anicteric sclera, no rhinorrhea, OP clear  Neck: no cervical LAD  CV: regular rate normal rhythm no murmur  Resp: easy WOB on RA " CTABL no w/r/r  Abd: +BS, soft, NTTP, no HSM  Ext: warm, no LE edema  Skin: no rash  Neuro: no focal deficits       Assessment      1) HIV disease, most recent CD4  (last Cd4 848 (33%), VL undetectable)   2) History of syphilis  3) History of HCV infection  4) History of cryptococcal meningitis  5) History of chlamydia  6) Erectile dysfunction  7) CKD     Plan     - Order repeat labs today (CBC, CMP, Cd4, VL)  - Ordering Syphilis, UA  - Checking yearly quantiferon gold  - Given patients history of CKD and worsening renal function on lab work from previous visit will likely discontinue Descovy and Tivicay and switch to Juluca.  Repeating CMP today to assess renal function.  Will contact patient and discuss plan once lab work results.   - repeat viral load in 4 weeks to assess for treatment failure on new regimen.   - Educated on importance of 100% medication compliance  - Educated on need for barrier contraception   - Reviewed vaccinations today. Flu shot administered.   - DXA scan ordered after previous visit, pt should call to schedule.   - continue follow-up with nephrology     RTC 3 months    Bert Adams MD  Infectious Diseases Faculty   of Medicine

## 2024-09-10 NOTE — TELEPHONE ENCOUNTER
Please let the patient know that his renal function has improved, although it is still impaired but at his baseline. Techinically his current regimen is safe to give at this time, but I still recommend that we switch out of safety concerns.     Please interate the following to the patient:  - Stop taking Descovy and Tivicay  - Start taking Juluca 1 tab PO daily (safe in patients with renal disease)  - Stop taking Prilosec due to interaction with Juluca   - Please have him contact his PCP to inform them of this change   - In the event that he needs to take a medication for GERD, I recommend Pepcid be started but this should be taken 12h apart from Juluca  - Return to lab in 4 weeks to check HIV viral load and CMP    Orders have been placed.    Bert Adams MD  Infectious Diseases Faculty   of Medicine

## 2024-09-11 LAB
CD3 CELLS # BLD: 1635 CELLS/MCL (ref 550–2202)
CD3 CELLS NFR BLD: 75 % (ref 58–86)
CD3+CD4+ CELLS # BLD: 844 CELLS/MCL (ref 365–1437)
CD3+CD4+ CELLS NFR BLD: 39 % (ref 32–64)
CD3+CD4+ CELLS/CD3+CD8+ CLL BLD: 1.1 %
CD3+CD8+ CELLS # BLD: 797 CELLS/MCL (ref 80–846)
CD3+CD8+ CELLS NFR BLD: 36 % (ref 8–40)
CD45 CELLS # BLD: 2.18 THOU/MCL (ref 0.82–2.84)
HIV1 RNA SERPL NAA+PROBE-LOG#: ABNORMAL {LOG_COPIES}/ML

## 2024-09-12 LAB — BACTERIA UR CULT: NO GROWTH

## 2024-09-13 LAB — T PALLIDUM AB SER QL AGGL: POSITIVE

## 2024-09-13 NOTE — TELEPHONE ENCOUNTER
Mahsa Khoury [facility] Infectious Disease Clinical Support Staff  Caller: Unspecified (Today, 10:46 AM)  Pt of Angie      Pt called requesting a call back from Aury.    Pt refused to leave detailed message.    Pt call back number [phone]      Please advise

## 2024-09-13 NOTE — TELEPHONE ENCOUNTER
Called pt at new number his sister gave me [phone]. Went over Dr Adams's message below in detail. He verbalized understanding of all info and asked I mail him a copy of his lab orders. Orders mail to him.

## 2024-10-21 ENCOUNTER — TELEPHONE (OUTPATIENT)
Dept: INFECTIOUS DISEASES | Facility: CLINIC | Age: 69
End: 2024-10-21
Payer: MEDICARE

## 2024-10-21 NOTE — LETTER
[facility] - Infectious Disease  [address] [address]  Phone: [phone] October 23, 2024    Renetta Glover   [address]  [address] [place] [id number]      To Whom It May Concern:    Renetta Glover is unable to participate in jury duty on November 4th, 2024 due to his medical condition.    If you have any questions or concerns, please feel free to call my office.    Sincerely,        Bert Adams MD

## 2024-10-21 NOTE — TELEPHONE ENCOUNTER
----- Message from Mahsa sent at 10/21/2024  1:58 PM CDT -----  Pt of        Pt sister called asking if Dr. Adams can give her brother a doctor excuse for jury duty on November 04/2024    Pt sister Malena Denis requesting a call back at [phone]      Please advise

## 2024-10-23 NOTE — TELEPHONE ENCOUNTER
Spoke to Malena needs the excuse for Nov 4th. Asked we mail it to her home   [address]  [facility], [address]

## 2024-10-23 NOTE — TELEPHONE ENCOUNTER
That's fine we can write him an excuse    Bert Adams MD  Infectious Diseases Faculty   of Medicine

## 2024-11-18 ENCOUNTER — TELEPHONE (OUTPATIENT)
Dept: NEPHROLOGY | Facility: CLINIC | Age: 69
End: 2024-11-18
Payer: MEDICARE

## 2024-11-18 NOTE — TELEPHONE ENCOUNTER
Informed Ms. Mauro beach have been faxed per request.     ----- Message from Irlanda sent at 11/18/2024  1:42 PM CST -----  Regarding: labs  Pt sister Mauro called to see if pt lab order can be sent to [facility] .     [phone] ( mauro )

## 2024-11-26 ENCOUNTER — OFFICE VISIT (OUTPATIENT)
Dept: NEPHROLOGY | Facility: CLINIC | Age: 69
End: 2024-11-26
Payer: MEDICARE

## 2024-11-26 VITALS
WEIGHT: 214.5 LBS | TEMPERATURE: 98 F | HEIGHT: 67 IN | OXYGEN SATURATION: 95 % | SYSTOLIC BLOOD PRESSURE: 130 MMHG | BODY MASS INDEX: 33.67 KG/M2 | HEART RATE: 106 BPM | RESPIRATION RATE: 20 BRPM | DIASTOLIC BLOOD PRESSURE: 76 MMHG

## 2024-11-26 DIAGNOSIS — D63.1 ANEMIA, CHRONIC RENAL FAILURE, STAGE 3 (MODERATE): ICD-10-CM

## 2024-11-26 DIAGNOSIS — N18.30 ANEMIA, CHRONIC RENAL FAILURE, STAGE 3 (MODERATE): ICD-10-CM

## 2024-11-26 DIAGNOSIS — N18.32 CKD STAGE G3B/A1, GFR 30-44 AND ALBUMIN CREATININE RATIO <30 MG/G: Primary | ICD-10-CM

## 2024-11-26 DIAGNOSIS — I10 ESSENTIAL HYPERTENSION: ICD-10-CM

## 2024-11-26 PROCEDURE — 99213 OFFICE O/P EST LOW 20 MIN: CPT | Mod: PBBFAC | Performed by: NURSE PRACTITIONER

## 2024-11-26 NOTE — PROGRESS NOTES
"[facility]  Nephrology Clinic Note    Chief complaint: Chronic Kidney Disease (Follow up)    History of present illness:   Renetta Glover Jr. is a 69 y.o. Other male with past medical history of chronic kidney disease, HIV, treated hepatitis C, syphilis, arthritis, nephrolithiasis, past history of polysubstance abuse, and hypertension. Presents for follow-up appointment in nephrology clinic.    Denies complaints.    Review of Systems  12 point review of systems conducted, negative except as stated in the history of present illness.    Allergies: Patient has No Known Allergies.     Past Medical History:  has a past medical history of Disorder of kidney and ureter, GERD (gastroesophageal reflux disease), History of cryptococcal meningitis, History of syphilis, Human immunodeficiency virus (HIV) disease, and Hypertension.    Procedure History:  has a past surgical history that includes Hip replacement arthroplasty (Right, 02/02/2021); Esophagogastroduodenoscopy (N/A, 06/20/2022); robotic arthroplasty, hip (Left, 08/18/2022); and Colonoscopy.    Family History: family history includes Diabetes in his brother and mother.    Social History:  reports that he has been smoking cigarettes. He started smoking about 48 years ago. He has a 12.2 pack-year smoking history. He uses smokeless tobacco. He reports that he does not currently use alcohol. He reports current drug use. Drug: "Crack" cocaine.    Physical exam  /76 (BP Location: Left arm, Patient Position: Sitting)   Pulse 106   Temp 98.2 °F (36.8 °C) (Oral)   Resp 20   Ht 5' 7" (1.702 m)   Wt 97.3 kg (214 lb 8.1 oz)   SpO2 95%   BMI 33.60 kg/m²   General appearance: Patient is in no acute distress.  Skin: No rashes or wounds.  HEENT: PERRLA, EOMI, no scleral icterus, no JVD. Neck is supple.  Chest: Respirations are unlabored. Lungs sounds are clear.   Heart: S1, S2.   Abdomen: Benign.  : Deferred.  Extremities: No edema, " peripheral pulses are palpable.   Neuro: No focal deficits.     Home Medications:    Current Outpatient Medications:     amitriptyline (ELAVIL) 75 MG tablet, Take 75 mg by mouth every evening., Disp: , Rfl:     amLODIPine (NORVASC) 10 MG tablet, Take 1 tablet (10 mg total) by mouth once daily., Disp: 90 tablet, Rfl: 1    dolutegravir-rilpivirine 50-25 mg (JULUCA) 50-25 mg Tab, Take 1 tablet by mouth once daily., Disp: 30 tablet, Rfl: 2    levothyroxine (SYNTHROID) 75 MCG tablet, Take 1 tablet (75 mcg total) by mouth before breakfast. For thyroid, Disp: 90 tablet, Rfl: 0    tadalafiL (CIALIS) 5 MG tablet, Take 1.5 tablets (7.5 mg total) by mouth daily as needed for Erectile Dysfunction., Disp: 20 tablet, Rfl: 0    aspirin (ECOTRIN) 81 MG EC tablet, Take 1 tablet (81 mg total) by mouth 2 (two) times a day. Increase to twice a day for 6 weeks and then restart daily dose on 10/4/22 (Patient not taking: Reported on 11/26/2024), Disp: 84 tablet, Rfl: 0    Laboratory data    Lab Results   Component Value Date    WBC 6.40 09/10/2024    HGB 13.1 (L) 09/10/2024    HCT 38.3 (L) 09/10/2024     09/10/2024    IRON 75 03/28/2019    TIBC 328 03/28/2019    LABIRON 22.9 03/28/2019    FERRITIN 539.6 (H) 07/13/2017    FOLATE 17.9 (H) 07/12/2018    ZEFTAVNH34 261 07/12/2018     07/24/2017     09/10/2024    K 4.1 09/10/2024    CO2 24 09/10/2024    BUN 23.6 09/10/2024    CREATININE 1.97 (H) 09/10/2024    EGFRNORACEVR 36 09/10/2024    GLUCOSE 101 09/10/2024    CALCIUM 9.4 09/10/2024    ALKPHOS 104 09/10/2024    LABPROT 8.1 (H) 09/10/2024    ALBUMIN 3.7 09/10/2024    BILIDIR 0.1 01/20/2022    IBILI 0.10 01/20/2022    AST 25 09/10/2024    ALT 19 09/10/2024    MG 1.9 08/04/2017    PHOS 4.4 04/25/2024      Lab Results   Component Value Date    HGBA1C 5.6 01/15/2021    .9 (H) 04/25/2024    [id number] 33.9 04/25/2024    HIV Reactive (A) 09/05/2019    HEPCAB Reactive (A) 08/05/2021    HEPBSAB Reactive (A) 08/15/2022      Urine:  Lab Results   Component Value Date    APPEARANCEUA Clear 09/10/2024    SGUA 1.023 09/10/2024    PROTEINUA Trace (A) 09/10/2024    KETONESUA Negative 09/10/2024    LEUKOCYTESUR 500 (A) 09/10/2024    RBCUA 0-5 09/10/2024    WBCUA 21-50 (A) 09/10/2024    BACTERIA None Seen 09/10/2024    SQEPUA None Seen 09/10/2024    HYALINECASTS 0-2 (A) 09/10/2024    CREATRANDUR 233.9 (H) 03/23/2023    PROTEINURINE 34.4 03/23/2023    UPROTCREA 0.1 03/23/2023         Imaging  Kidney ultrasound 05/10/2024: Right kidney measures 9.2 x 5 by 4.3 cm.  Left kidney measures 8.7 x 5.3 x 4.5 cm.  No solid mass, hydronephrosis, or cyst formation identified.  The bladder is not imaged.  Impression: No sonographic abnormality of the kidneys.      Impression    ICD-10-CM ICD-9-CM   1. CKD stage G3b/A1, GFR 30-44 and albumin creatinine ratio <30 mg/g  N18.32 585.3   2. Essential hypertension  I10 401.9   3. Anemia, chronic renal failure, stage 3 (moderate)  N18.30 285.21    D63.1 585.3   4. BMI 33.0-33.9,adult  Z68.33 V85.33        Plan  CKD stage G3b/A1, GFR 30-44 and albumin creatinine ratio <30 mg/g  Possibly hypertensive kidney disease.  There is no significant proteinuria.  Acute kidney injury has resolved after discontinuation of angiotensin receptor blocker.  Kidney ultrasound was unremarkable.  Continue risk factor management and periodic monitoring.      Essential hypertension  Blood pressure reading is at goal, continue current antihypertensive regimen and 2 g a day dietary sodium restriction.      Anemia, chronic renal failure, stage 3 (moderate)  There are no indications for erythropoietin stimulating agent therapy at present.  Will continue to monitor hemoglobin and hematocrit levels periodically.    BMI 33.0-33.9,adult  Lifestyle and dietary interventions discussed, patient encouraged to maintain non-sedentary lifestyle and well-balanced diet.         Follow up in about 6 months (around 5/26/2025).     Orders Placed This  Encounter   Procedures    Comprehensive Metabolic Panel     Standing Status:   Future     Standing Expiration Date:   5/30/2025    Protein/Creatinine Ratio, Urine     Standing Status:   Future     Standing Expiration Date:   5/30/2025     Order Specific Question:   Specimen Source     Answer:   Urine    Urinalysis, Reflex to Urine Culture     Standing Status:   Future     Standing Expiration Date:   5/30/2025     Order Specific Question:   Specimen Source     Answer:   Urine       Alison Lemon NP  [facility] Nephrology

## 2024-12-23 DIAGNOSIS — B20 HIV DISEASE: Primary | ICD-10-CM

## 2024-12-23 RX ORDER — DOLUTEGRAVIR SODIUM AND RILPIVIRINE HYDROCHLORIDE 50; 25 MG/1; MG/1
1 TABLET, FILM COATED ORAL DAILY
Qty: 30 TABLET | Refills: 3 | Status: SHIPPED | OUTPATIENT
Start: 2024-12-23

## 2025-03-20 ENCOUNTER — OFFICE VISIT (OUTPATIENT)
Dept: INFECTIOUS DISEASES | Facility: CLINIC | Age: 70
End: 2025-03-20
Payer: MEDICARE

## 2025-03-20 ENCOUNTER — LAB VISIT (OUTPATIENT)
Dept: LAB | Facility: HOSPITAL | Age: 70
End: 2025-03-20
Attending: STUDENT IN AN ORGANIZED HEALTH CARE EDUCATION/TRAINING PROGRAM
Payer: MEDICARE

## 2025-03-20 VITALS
HEART RATE: 112 BPM | SYSTOLIC BLOOD PRESSURE: 134 MMHG | HEIGHT: 67 IN | DIASTOLIC BLOOD PRESSURE: 64 MMHG | BODY MASS INDEX: 34.3 KG/M2 | WEIGHT: 218.56 LBS | TEMPERATURE: 99 F | RESPIRATION RATE: 16 BRPM

## 2025-03-20 DIAGNOSIS — F14.11 HISTORY OF COCAINE ABUSE: ICD-10-CM

## 2025-03-20 DIAGNOSIS — Z86.19 HISTORY OF CRYPTOCOCCAL MENINGITIS: ICD-10-CM

## 2025-03-20 DIAGNOSIS — B20 HIV DISEASE: ICD-10-CM

## 2025-03-20 DIAGNOSIS — B20 HIV DISEASE: Primary | ICD-10-CM

## 2025-03-20 DIAGNOSIS — N52.9 ERECTILE DYSFUNCTION, UNSPECIFIED ERECTILE DYSFUNCTION TYPE: ICD-10-CM

## 2025-03-20 DIAGNOSIS — N18.32 STAGE 3B CHRONIC KIDNEY DISEASE: ICD-10-CM

## 2025-03-20 DIAGNOSIS — Z86.19 HEPATITIS C VIRUS INFECTION CURED AFTER ANTIVIRAL DRUG THERAPY: ICD-10-CM

## 2025-03-20 DIAGNOSIS — Z86.19 HISTORY OF SYPHILIS: ICD-10-CM

## 2025-03-20 LAB
ALBUMIN SERPL-MCNC: 4 G/DL (ref 3.4–4.8)
ALBUMIN/GLOB SERPL: 0.9 RATIO (ref 1.1–2)
ALP SERPL-CCNC: 79 UNIT/L (ref 40–150)
ALT SERPL-CCNC: 33 UNIT/L (ref 0–55)
ANION GAP SERPL CALC-SCNC: 5 MEQ/L
AST SERPL-CCNC: 28 UNIT/L (ref 5–34)
BACTERIA #/AREA URNS AUTO: ABNORMAL /HPF
BASOPHILS # BLD AUTO: 0.08 X10(3)/MCL
BASOPHILS NFR BLD AUTO: 1.1 %
BILIRUB SERPL-MCNC: 0.2 MG/DL
BILIRUB UR QL STRIP.AUTO: NEGATIVE
BUN SERPL-MCNC: 26.3 MG/DL (ref 8.4–25.7)
C TRACH RRNA UR QL NAA+PROBE: NOT DETECTED
CALCIUM SERPL-MCNC: 9.3 MG/DL (ref 8.8–10)
CHLORIDE SERPL-SCNC: 107 MMOL/L (ref 98–107)
CLARITY UR: CLEAR
CO2 SERPL-SCNC: 26 MMOL/L (ref 23–31)
COLOR UR AUTO: ABNORMAL
CREAT SERPL-MCNC: 2.16 MG/DL (ref 0.72–1.25)
CREAT/UREA NIT SERPL: 12
EOSINOPHIL # BLD AUTO: 0.42 X10(3)/MCL (ref 0–0.9)
EOSINOPHIL NFR BLD AUTO: 5.5 %
ERYTHROCYTE [DISTWIDTH] IN BLOOD BY AUTOMATED COUNT: 14.6 % (ref 11.5–17)
GFR SERPLBLD CREATININE-BSD FMLA CKD-EPI: 32 ML/MIN/1.73/M2
GLOBULIN SER-MCNC: 4.6 GM/DL (ref 2.4–3.5)
GLUCOSE SERPL-MCNC: 103 MG/DL (ref 82–115)
GLUCOSE UR QL STRIP: NORMAL
HCT VFR BLD AUTO: 42.4 % (ref 42–52)
HGB BLD-MCNC: 13.9 G/DL (ref 14–18)
HGB UR QL STRIP: NEGATIVE
HIV1 RNA # SERPL NAA+PROBE: <20 COPIES/ML
HIV1 RNA SERPL NAA+PROBE-LOG#: ABNORMAL {LOG_COPIES}/ML
HYALINE CASTS #/AREA URNS LPF: ABNORMAL /LPF
IMM GRANULOCYTES # BLD AUTO: 0.04 X10(3)/MCL (ref 0–0.04)
IMM GRANULOCYTES NFR BLD AUTO: 0.5 %
KETONES UR QL STRIP: NEGATIVE
LEUKOCYTE ESTERASE UR QL STRIP: 25
LYMPHOCYTES # BLD AUTO: 2.78 X10(3)/MCL (ref 0.6–4.6)
LYMPHOCYTES NFR BLD AUTO: 36.5 %
MCH RBC QN AUTO: 31.1 PG (ref 27–31)
MCHC RBC AUTO-ENTMCNC: 32.8 G/DL (ref 33–36)
MCV RBC AUTO: 94.9 FL (ref 80–94)
MONOCYTES # BLD AUTO: 0.91 X10(3)/MCL (ref 0.1–1.3)
MONOCYTES NFR BLD AUTO: 12 %
MUCOUS THREADS URNS QL MICRO: ABNORMAL /LPF
N GONORRHOEA DNA UR QL NAA+PROBE: NOT DETECTED
NEUTROPHILS # BLD AUTO: 3.38 X10(3)/MCL (ref 2.1–9.2)
NEUTROPHILS NFR BLD AUTO: 44.4 %
NITRITE UR QL STRIP: NEGATIVE
NRBC BLD AUTO-RTO: 0 %
PH UR STRIP: 5.5 [PH]
PLATELET # BLD AUTO: 270 X10(3)/MCL (ref 130–400)
PMV BLD AUTO: 11.6 FL (ref 7.4–10.4)
POTASSIUM SERPL-SCNC: 4.6 MMOL/L (ref 3.5–5.1)
PROT SERPL-MCNC: 8.6 GM/DL (ref 5.8–7.6)
PROT UR QL STRIP: NEGATIVE
RBC # BLD AUTO: 4.47 X10(6)/MCL (ref 4.7–6.1)
RBC #/AREA URNS AUTO: ABNORMAL /HPF
RPR SER QL: NORMAL
SODIUM SERPL-SCNC: 138 MMOL/L (ref 136–145)
SP GR UR STRIP.AUTO: 1.02 (ref 1–1.03)
SQUAMOUS #/AREA URNS LPF: ABNORMAL /HPF
T PALLIDUM AB SER QL: REACTIVE
T VAGINALIS RRNA UR QL NAA+PROBE: NOT DETECTED
UROBILINOGEN UR STRIP-ACNC: NORMAL
WBC # BLD AUTO: 7.61 X10(3)/MCL (ref 4.5–11.5)
WBC #/AREA URNS AUTO: ABNORMAL /HPF

## 2025-03-20 PROCEDURE — 86480 TB TEST CELL IMMUN MEASURE: CPT

## 2025-03-20 PROCEDURE — 86780 TREPONEMA PALLIDUM: CPT

## 2025-03-20 PROCEDURE — 81015 MICROSCOPIC EXAM OF URINE: CPT

## 2025-03-20 PROCEDURE — 87591 N.GONORRHOEAE DNA AMP PROB: CPT

## 2025-03-20 PROCEDURE — 86592 SYPHILIS TEST NON-TREP QUAL: CPT

## 2025-03-20 PROCEDURE — 85025 COMPLETE CBC W/AUTO DIFF WBC: CPT

## 2025-03-20 PROCEDURE — 87536 HIV-1 QUANT&REVRSE TRNSCRPJ: CPT

## 2025-03-20 PROCEDURE — 99213 OFFICE O/P EST LOW 20 MIN: CPT | Mod: PBBFAC

## 2025-03-20 PROCEDURE — 86361 T CELL ABSOLUTE COUNT: CPT

## 2025-03-20 PROCEDURE — 80053 COMPREHEN METABOLIC PANEL: CPT

## 2025-03-20 PROCEDURE — 36415 COLL VENOUS BLD VENIPUNCTURE: CPT

## 2025-03-20 RX ORDER — TADALAFIL 5 MG/1
7.5 TABLET ORAL DAILY PRN
Qty: 20 TABLET | Refills: 0 | Status: SHIPPED | OUTPATIENT
Start: 2025-03-20 | End: 2026-03-20

## 2025-03-20 RX ORDER — PSYLLIUM HUSK 0.4 G
1 CAPSULE ORAL 2 TIMES DAILY WITH MEALS
COMMUNITY

## 2025-03-20 RX ORDER — TADALAFIL 10 MG/1
10 TABLET ORAL DAILY PRN
Qty: 20 TABLET | Refills: 0 | Status: SHIPPED | OUTPATIENT
Start: 2025-03-20 | End: 2025-04-09

## 2025-03-20 RX ORDER — DOLUTEGRAVIR SODIUM AND RILPIVIRINE HYDROCHLORIDE 50; 25 MG/1; MG/1
1 TABLET, FILM COATED ORAL DAILY
Qty: 30 TABLET | Refills: 5 | Status: SHIPPED | OUTPATIENT
Start: 2025-03-20 | End: 2025-09-16

## 2025-03-20 RX ORDER — AMLODIPINE BESYLATE 10 MG/1
10 TABLET ORAL DAILY
Qty: 90 TABLET | Refills: 1 | Status: SHIPPED | OUTPATIENT
Start: 2025-03-20

## 2025-03-20 RX ORDER — ASPIRIN 81 MG/1
81 TABLET ORAL 2 TIMES DAILY
Start: 2025-03-20 | End: 2025-05-01

## 2025-03-20 RX ORDER — TADALAFIL 10 MG/1
10 TABLET ORAL DAILY PRN
COMMUNITY
Start: 2025-02-26 | End: 2025-03-20 | Stop reason: SDUPTHER

## 2025-03-20 NOTE — PROGRESS NOTES
[facility] Infectious Diseases Clinic Note    Subjective:      BELÉN Glover is a 68 year old male with PMH HIV disease (last Cd4 844 (39%), VL undetectable 9/2024), syphilis, hepatitis C, chlamydia, cryptococcal meningitis, CKD who presents to ID clinic today for follow up visit. He is currently on Juluca and endorses 100% compliance.    Last HCV viral load undetected 6/2023. Syphilis Ab reactive, RPR nonreactive 4/2024. Hepatitis C co-infection in which he was treated and cured (1/2020 with Mavyret x 8 weeks - F1-F2 disease).      He denies subjective complaints such as fever, chills, nausea, vomiting, abdominal pain. He continues to smoke cigarettes and is not interested in quitting. He does endrose having a few sexual encounters with the same female partner, is amenable to STI screening today.     HIV history:  Diagnosed 2017 - CD4 60 and VL 25,000 at diagnosis   Risk factor - MSF  History OI - Cryptococal meningitis  History STIs - Hep c, chlamydia  Prior ART:                 - Descovy/Tivicay 2017- P   - Juluca 9/2024- P  Genotype testing: wildtype              -NRTI resistance mutations: none              -NNRTI resistance mutations: none              -PI resistance mutations: none  HLA- testing negative  G6PD testing unknown    Health Screening:    The following items were screened for at today's visit:  [x] Substance Abuse  [x] Alcohol Abuse  [] Depression  [] Employment  [x] Housing Situation/Homelessness  [] Travel  [] TB Exposure   [x] Domestic Abuse   [x] Smoking Cessation    Labs  HIV Viral Load: check q3-6 months  HIV VL: Undetectable, Date: 1/25/24      CD4 Count: check q3-6 months  Lab Results   Component Value Date    WBCABSOLUTE 6,020 01/25/2024    LYMPHPERCENT 35 01/25/2024    LYMPHABSOLUT 2,107 01/25/2024    AH3FHAHABSJ 818 01/25/2024    TCELLINTERP  01/25/2024     Normal absolute lymphocyte count with normal absolute CD4+ lymphocyte count.    Hoang Diane M.D.     CBC with  "Differential: check q3-6 months  Lab Results   Component Value Date    WBC 6.40 09/10/2024    RBC 4.01 (L) 09/10/2024    HGB 13.1 (L) 09/10/2024    HCT 38.3 (L) 09/10/2024    MCV 95.5 (H) 09/10/2024    MCH 32.7 (H) 09/10/2024    MCHC 34.2 09/10/2024    RDW 13.9 09/10/2024     09/10/2024    MPV 11.3 (H) 09/10/2024     CMP: check q3-6 months  Lab Results   Component Value Date     09/10/2024    K 4.1 09/10/2024     (H) 09/10/2024    GLUCOSE 101 09/10/2024    BUN 23.6 09/10/2024    CREATININE 1.97 (H) 09/10/2024    CALCIUM 9.4 09/10/2024    BILITOT 0.3 09/10/2024    AST 25 09/10/2024    ALT 19 09/10/2024    ALKPHOS 104 09/10/2024    ALBUMIN 3.7 09/10/2024     Quantiferon: check q1yr  TB Gold: Not Detected, Date: 6/12/23    Syphilis IgG: check q1yr, unless Hx syphilis then RPR titer q1yr  Lab Results   Component Value Date    SYPHAB Reactive (A) 09/10/2024    LABRPR Non-Reactive 09/10/2024     Toxoplasmosis IgG: check once  No results found for: "TXIGMS", "TOXIGGS", "TOXIGGVL"  Gonorrhea: check q1yr  Lab Results   Component Value Date    NGONNO Not Detected 01/25/2024    LABCHLAPCR Not Detected 01/25/2024     Lipid Panel: check q1yr  Lab Results   Component Value Date    TRIG 152 (H) 08/05/2021    CHOL 160 08/05/2021    HDL 62 (H) 08/05/2021    VLDL 30 08/05/2021     A1C: check q1yr  Lab Results   Component Value Date    HGBA1C 5.6 01/15/2021     Urinalysis: check q1yr, check q6mo if taking tenofovir  Lab Results   Component Value Date    PROTEINUA Trace (A) 09/10/2024    UROBILINOGEN Normal 09/10/2024    KETONESU Negative 01/20/2022    NITRITE Negative 09/10/2024    LEUKOCYTESUR 500 (A) 09/10/2024    COLORU Light-Yellow 09/10/2024    RBCUA 0-5 09/10/2024     Glucose-6 Phosphate Dehydrogenase: check once  No results found for: "A3PPMYJ"  Hepatitis A IgG: check once  Lab Results   Component Value Date    HEPAIGG Reactive (A) 08/15/2022     Hepatitis B Surface Antibody: check once  Lab Results "   Component Value Date    HEPBSAB Reactive (A) 08/15/2022    HBSABQUANT 24.66 08/15/2022     Hepatitis C Antibody: check q1yr if high risk, once if low risk  Lab Results   Component Value Date    HEPCAB Reactive (A) 08/05/2021     Vaccines  Pneumovax-23: if CD4>200 give twice q5yrs apart before age 65, then once at 65, give >8wks from last Prevnar  Prevnar-13: if CD4>200 give once, give >1yr from last Pneumovax-23  Influenza vaccine: q1yr  HPV: Gardasil-9 at 0, 2, and 6 months for ages 15-45  Tdap: if age >35 give Td q10yrs, replace with Tdap once  MenACWY: 2 dose primary series 8 weeks apart, then 1 dose booster q5yr  Shingrix: All HIV above >19 y/o receive 2 dose series, timed 4 weeks to 6 months apart    Immunization History   Administered Date(s) Administered    COVID-19 MRNA, LN-S PF (MODERNA HALF 0.25 ML DOSE) 01/17/2022    COVID-19 Vaccine 03/19/2021, 04/16/2021, 01/17/2022    COVID-19, MRNA, LN-S, PF (MODERNA FULL 0.5 ML DOSE) 03/19/2021, 04/16/2021    Influenza 10/08/2020    Influenza (FLUAD) - Quadrivalent - Adjuvanted - PF *Preferred* (65+) 01/11/2023, 09/25/2023    Influenza - Quadrivalent - High Dose - PF (65 years and older) 01/20/2022    Influenza - Quadrivalent - PF (6-35 months) 12/13/2018, 01/21/2020    Influenza - Quadrivalent - PF *Preferred* (6 months and older) 10/08/2020    Influenza - Trivalent - Afluria, Fluzone MDV 12/27/2005, 01/19/2011    Influenza - Trivalent - Fluad - Adjuvanted - PF (65 years and older 09/10/2024    Influenza - Trivalent - Fluarix, Flulaval, Fluzone, Afluria - PF 12/27/2005, 01/19/2011, 10/08/2020    Influenza - Trivalent - Fluzone High Dose - PF (65 years and older) 10/08/2020    Meningococcal Conjugate (MCV4O) 1 Vial Dose(10yr-55yr) 01/21/2020, 01/20/2022    Pneumococcal Conjugate - 13 Valent 12/13/2018    Pneumococcal Conjugate - 20 Valent 04/25/2024    Pneumococcal Polysaccharide - 23 Valent 07/12/2018, 03/28/2019    Td (ADULT) 02/01/2021, 08/22/2022    Tdap  "01/21/2020, 11/13/2021    Tuberculin 02/01/2021, 08/22/2022    Zoster Recombinant 01/20/2022, 06/12/2023       Imaging  DEXA Scan: if age>50, check q10yrs  No results found for this or any previous visit.    Pathology:  Pap Smear: if female and age >21 check cervical pap q1yr, if male and MSM check rectal pap q1yr  No results found for: "PAP"    ASCVD Risk Score:  Consider high-intensity statin therapy if 10-year ASCVD risk score >7.5%  The ASCVD Risk score (Magdi EVANS, et al., 2019) failed to calculate for the following reasons:    Cannot find a previous HDL lab    Cannot find a previous total cholesterol lab    Review of Systems   Constitutional: Negative.  Negative for chills, fever, malaise/fatigue and weight loss.   HENT: Negative.  Negative for congestion and sore throat.    Eyes: Negative.  Negative for blurred vision.   Respiratory: Negative.  Negative for cough, sputum production and shortness of breath.    Cardiovascular: Negative.  Negative for chest pain, palpitations and leg swelling.   Gastrointestinal: Negative.  Negative for abdominal pain, diarrhea, nausea and vomiting.   Genitourinary: Negative.  Negative for dysuria.   Musculoskeletal: Negative.  Negative for joint pain.   Skin: Negative.  Negative for rash.   Neurological: Negative.  Negative for focal weakness, weakness and headaches.   Endo/Heme/Allergies: Negative.    Psychiatric/Behavioral: Negative.       The following portions of the patient's history were reviewed and updated as appropriate: allergies, current medications, past family history, past medical history, past social history, past surgical history and problem list.    Objective:   Vitals:  /64 (BP Location: Left arm, Patient Position: Sitting)   Pulse (!) 112   Temp 98.5 °F (36.9 °C) (Oral)   Resp 16   Ht 5' 7" (1.702 m)   Wt 99.2 kg (218 lb 9.4 oz)   BMI 34.24 kg/m²  Body mass index is 34.24 kg/m².    Gen: awake, alert, NAD  HEENT: NC/AT, EOMi, anicteric sclera, no " rhinorrhea, OP clear  Neck: no cervical LAD  CV: regular rate normal rhythm no murmur  Resp: easy WOB on RA CTABL no w/r/r  Abd: +BS, soft, NTTP, no HSM  Ext: warm, no LE edema  Skin: no rash  Neuro: no focal deficits       Assessment      1) HIV disease, most recent CD4  (last Cd4 848 (33%), VL undetectable)   2) History of syphilis  3) History of HCV infection  4) History of cryptococcal meningitis  5) History of chlamydia  6) Erectile dysfunction  7) CKD     Plan     - Order repeat labs today (CBC, CMP, Cd4, VL)  - Ordering Syphilis, UA, STI screening  - Checking yearly quantiferon gold, ordered today  - Continue Juluca 1 tab once daily, switched due to worsening renal indices  - Educated on importance of 100% medication compliance  - Educated on need for barrier contraception   - Reviewed vaccinations today. Flu shot administered.   - DXA scan ordered after previous visit, pt should call to schedule.   - continue follow-up with nephrology     RTC 3 months    Bert Adams MD  Infectious Diseases Faculty   of Medicine

## 2025-03-21 LAB
AGE: 69
CD3+CD4+ CELLS # SPEC: 833 UNIT/L (ref 589–1505)
CD3+CD4+ CELLS NFR BLD: 29.6 %
LYMPHOCYTES # BLD AUTO: 2815.7 X10(3)/MCL (ref 1260–5520)
LYMPHOCYTES NFR LN MANUAL: 37 % (ref 28–48)
LYMPHOMA - T-CELL MARKERS SPEC-IMP: NORMAL
WBC # BLD AUTO: 7610 /MM3 (ref 4500–11500)

## 2025-03-24 LAB
GAMMA INTERFERON BACKGROUND BLD IA-ACNC: 0.06 IU/ML
M TB IFN-G BLD-IMP: NEGATIVE
M TB IFN-G CD4+ BCKGRND COR BLD-ACNC: -0.01 IU/ML
M TB IFN-G CD4+CD8+ BCKGRND COR BLD-ACNC: -0.01 IU/ML
MITOGEN IGNF BCKGRD COR BLD-ACNC: 9.94 IU/ML
T PALLIDUM AB SER QL AGGL: POSITIVE

## 2025-05-15 DIAGNOSIS — N18.32 CKD STAGE G3B/A1, GFR 30-44 AND ALBUMIN CREATININE RATIO <30 MG/G: Primary | ICD-10-CM

## 2025-05-16 ENCOUNTER — TELEPHONE (OUTPATIENT)
Dept: NEPHROLOGY | Facility: CLINIC | Age: 70
End: 2025-05-16
Payer: MEDICARE

## 2025-05-16 NOTE — TELEPHONE ENCOUNTER
Informed lab orders were faxed, stated understanding----- Message from ALCON Hayes sent at 5/15/2025  1:16 PM CDT -----  Regarding: FW: labs    ----- Message -----  From: Irlanda Kc  Sent: 5/15/2025   1:08 PM CDT  To: [facility] Nephrology Clinical Support Staff  Subject: labs                                              Pt sister Malena request labs to be sent to [facility].Malena- [phone]

## 2025-05-26 ENCOUNTER — OFFICE VISIT (OUTPATIENT)
Dept: NEPHROLOGY | Facility: CLINIC | Age: 70
End: 2025-05-26
Payer: MEDICARE

## 2025-05-26 VITALS
RESPIRATION RATE: 18 BRPM | OXYGEN SATURATION: 98 % | DIASTOLIC BLOOD PRESSURE: 74 MMHG | SYSTOLIC BLOOD PRESSURE: 117 MMHG | WEIGHT: 223.81 LBS | BODY MASS INDEX: 35.13 KG/M2 | TEMPERATURE: 98 F | HEIGHT: 67 IN | HEART RATE: 106 BPM

## 2025-05-26 DIAGNOSIS — N18.30 ANEMIA, CHRONIC RENAL FAILURE, STAGE 3 (MODERATE): ICD-10-CM

## 2025-05-26 DIAGNOSIS — D63.1 ANEMIA, CHRONIC RENAL FAILURE, STAGE 3 (MODERATE): ICD-10-CM

## 2025-05-26 DIAGNOSIS — I10 ESSENTIAL HYPERTENSION: ICD-10-CM

## 2025-05-26 DIAGNOSIS — N18.32 CKD STAGE G3B/A1, GFR 30-44 AND ALBUMIN CREATININE RATIO <30 MG/G: Primary | ICD-10-CM

## 2025-05-26 DIAGNOSIS — N25.81 SECONDARY HYPERPARATHYROIDISM OF RENAL ORIGIN: ICD-10-CM

## 2025-05-26 DIAGNOSIS — Z72.0 TOBACCO USER: ICD-10-CM

## 2025-05-26 PROCEDURE — 99214 OFFICE O/P EST MOD 30 MIN: CPT | Mod: S$PBB,,, | Performed by: NURSE PRACTITIONER

## 2025-05-26 PROCEDURE — 3288F FALL RISK ASSESSMENT DOCD: CPT | Mod: CPTII,,, | Performed by: NURSE PRACTITIONER

## 2025-05-26 PROCEDURE — 1125F AMNT PAIN NOTED PAIN PRSNT: CPT | Mod: CPTII,,, | Performed by: NURSE PRACTITIONER

## 2025-05-26 PROCEDURE — 3066F NEPHROPATHY DOC TX: CPT | Mod: CPTII,,, | Performed by: NURSE PRACTITIONER

## 2025-05-26 PROCEDURE — 1101F PT FALLS ASSESS-DOCD LE1/YR: CPT | Mod: CPTII,,, | Performed by: NURSE PRACTITIONER

## 2025-05-26 PROCEDURE — 1159F MED LIST DOCD IN RCRD: CPT | Mod: CPTII,,, | Performed by: NURSE PRACTITIONER

## 2025-05-26 PROCEDURE — 99215 OFFICE O/P EST HI 40 MIN: CPT | Mod: PBBFAC | Performed by: NURSE PRACTITIONER

## 2025-05-26 PROCEDURE — 3008F BODY MASS INDEX DOCD: CPT | Mod: CPTII,,, | Performed by: NURSE PRACTITIONER

## 2025-05-26 PROCEDURE — 3074F SYST BP LT 130 MM HG: CPT | Mod: CPTII,,, | Performed by: NURSE PRACTITIONER

## 2025-05-26 PROCEDURE — 3078F DIAST BP <80 MM HG: CPT | Mod: CPTII,,, | Performed by: NURSE PRACTITIONER

## 2025-05-26 RX ORDER — LOSARTAN POTASSIUM 50 MG/1
50 TABLET ORAL DAILY
COMMUNITY

## 2025-05-26 RX ORDER — METFORMIN HYDROCHLORIDE 500 MG/1
500 TABLET ORAL 2 TIMES DAILY
COMMUNITY

## 2025-05-26 RX ORDER — ALBUTEROL SULFATE 90 UG/1
2 INHALANT RESPIRATORY (INHALATION) EVERY 4 HOURS PRN
COMMUNITY

## 2025-05-26 RX ORDER — ASCORBIC ACID 125 MG
1 TABLET,CHEWABLE ORAL DAILY
COMMUNITY

## 2025-05-26 NOTE — PROGRESS NOTES
"  [facility]  Nephrology Clinic    Chief complaint: Follow-up and Chronic Kidney Disease (RTC, took BP med, c/o flank pain on both sides)    History of present illness:   Renetta Glover Jr. is a 69 y.o. Other male who presents to Nephrology clinic today for management of chronic kidney disease. The patient's pertinent chronic problems include HIV, treated hepatitis C, syphilis, arthritis, nephrolithiasis, past history of polysubstance abuse, and hypertension. C/o bilateral hip pain with walking.     Review of Systems  12 point review of systems conducted, negative except as stated in the history of present illness.    Allergies: Patient has no known allergies.     Past Medical History:  has a past medical history of Disorder of kidney and ureter, GERD (gastroesophageal reflux disease), History of cryptococcal meningitis, History of syphilis, Human immunodeficiency virus (HIV) disease, and Hypertension.    Procedure History:  has a past surgical history that includes Hip replacement arthroplasty (Right, 02/02/2021); Esophagogastroduodenoscopy (N/A, 06/20/2022); robotic arthroplasty, hip (Left, 08/18/2022); and Colonoscopy.    Family History: family history includes Diabetes in his brother and mother.    Social History:  reports that he has been smoking cigarettes. He started smoking about 49 years ago. He has a 12.3 pack-year smoking history. He uses smokeless tobacco. He reports current alcohol use of about 7.0 standard drinks of alcohol per week. He reports current drug use. Drug: "Crack" cocaine.    Physical exam  /74   Pulse 106   Temp 98.4 °F (36.9 °C) (Oral)   Resp 18   Ht 5' 6.93" (1.7 m)   Wt 101.5 kg (223 lb 12.8 oz)   SpO2 98%   BMI 35.13 kg/m²   General appearance: Patient is in no acute distress.  Skin: No rashes or wounds.  HEENT: PERRLA, EOMI, no scleral icterus, no JVD. Neck is supple.  Chest: Respirations are unlabored. Lungs sounds are clear.   Heart: S1, " S2.   Abdomen: Benign.  : Deferred.  Extremities: No edema, peripheral pulses are palpable.   Neuro: No focal deficits.     Home Medications:  Current Medications[1]    Laboratory data                              Lab Results   Component Value Date    WBC 7.61 03/20/2025    HGB 13.9 (L) 03/20/2025    HCT 42.4 03/20/2025     03/20/2025    IRON 75 03/28/2019    TIBC 328 03/28/2019    LABIRON 22.9 03/28/2019    FERRITIN 539.6 (H) 07/13/2017    FOLATE 17.9 (H) 07/12/2018    WYIAHPHS13 261 07/12/2018     07/24/2017     03/20/2025    K 4.6 03/20/2025    CO2 26 03/20/2025    BUN 26.3 (H) 03/20/2025    CREATININE 2.16 (H) 03/20/2025    EGFRNORACEVR 32 03/20/2025    CALCIUM 9.3 03/20/2025    ALKPHOS 79 03/20/2025    ALBUMIN 4.0 03/20/2025    BILIDIR 0.1 01/20/2022    IBILI 0.10 01/20/2022    AST 28 03/20/2025    ALT 33 03/20/2025    MG 1.9 08/04/2017    PHOS 4.4 04/25/2024      Lab Results   Component Value Date    HGBA1C 5.6 01/15/2021    .9 (H) 04/25/2024    HBAQMITF70YO 33.9 04/25/2024    HIV Reactive (A) 09/05/2019    HEPCAB Reactive (A) 08/05/2021    HEPBSAB Reactive (A) 08/15/2022     Urine:  Lab Results   Component Value Date    APPEARANCEUA Clear 03/20/2025    SGUA 1.018 03/20/2025    PROTEINUA Negative 03/20/2025    KETONESUA Negative 03/20/2025    LEUKOCYTESUR 25 (A) 03/20/2025    RBCUA 0-5 03/20/2025    WBCUA 0-5 03/20/2025    BACTERIA Trace (A) 03/20/2025    SQEPUA Trace (A) 03/20/2025    HYALINECASTS 0-2 (A) 03/20/2025    CREATRANDUR 233.9 (H) 03/23/2023    PROTEINURINE 34.4 03/23/2023    UPROTCREA 0.1 03/23/2023         Imaging  idney ultrasound 05/10/2024: Right kidney measures 9.2 x 5 by 4.3 cm.  Left kidney measures 8.7 x 5.3 x 4.5 cm.  No solid mass, hydronephrosis, or cyst formation identified.  The bladder is not imaged.  Impression: No sonographic abnormality of the kidneys.       Impression    ICD-10-CM ICD-9-CM   1. CKD stage G3b/A1, GFR 30-44 and albumin creatinine ratio  <30 mg/g  N18.32 585.3   2. Essential hypertension  I10 401.9   3. Anemia, chronic renal failure, stage 3 (moderate)  N18.30 285.21    D63.1 585.3   4. Secondary hyperparathyroidism of renal origin  N25.81 588.81   5. Tobacco user  Z72.0 305.1        Plan  CKD stage G3b/A1, GFR 30-44 and albumin creatinine ratio <30 mg/g  -     Comprehensive Metabolic Panel; Future; Expected date: 11/16/2025  -     Microalbumin/Creatinine Ratio, Urine; Future; Expected date: 11/16/2025  -     Urinalysis, Reflex to Urine Culture; Future; Expected date: 11/16/2025  -     PTH, Intact; Future; Expected date: 11/16/2025  Possibly hypertensive kidney disease.  There is no significant proteinuria.  Kidney ultrasound was unremarkable.  Continue risk factor management and periodic monitoring.      Essential hypertension  Blood pressure reading is at goal, continue current antihypertensive regimen and 2 g a day dietary sodium restriction.      Anemia, chronic renal failure, stage 3 (moderate)  There are no indications for erythropoietin stimulating agent therapy at present.  Will continue to monitor hemoglobin and hematocrit levels periodically.    Secondary hyperparathyroidism of renal origin  There are no indications for active vitamin-D analogs at this time.  Will continue to monitor intact PTH, calcium, and phosphorus levels periodically.      Tobacco user  Patient was counseled on importance of tobacco use cessation.  Strongly encouraged to quit, offered a referral to a smoking cessation program.        Follow up in about 6 months (around 11/26/2025).       Alison Lemon NP  [facility] Nephrology            [1]   Current Outpatient Medications:     albuterol (PROVENTIL/VENTOLIN HFA) 90 mcg/actuation inhaler, Inhale 2 puffs into the lungs every 4 (four) hours as needed., Disp: , Rfl:     amitriptyline (ELAVIL) 75 MG tablet, Take 75 mg by mouth every evening., Disp: , Rfl:     amLODIPine (NORVASC) 10 MG tablet, Take 1 tablet (10 mg total) by  mouth once daily., Disp: 90 tablet, Rfl: 1    calcium-vitamin D3 (OS-MIKE 500 + D3) 500 mg-5 mcg (200 unit) per tablet, Take 1 tablet by mouth 2 (two) times daily with meals., Disp: , Rfl:     dolutegravir-rilpivirine 50-25 mg (JULUCA) 50-25 mg Tab, Take 1 tablet by mouth once daily., Disp: 30 tablet, Rfl: 5    levothyroxine (SYNTHROID) 75 MCG tablet, Take 1 tablet (75 mcg total) by mouth before breakfast. For thyroid, Disp: 90 tablet, Rfl: 0    losartan (COZAAR) 50 MG tablet, Take 50 mg by mouth once daily., Disp: , Rfl:     metFORMIN (GLUCOPHAGE) 500 MG tablet, Take 500 mg by mouth 2 (two) times daily., Disp: , Rfl:     tadalafiL (CIALIS) 5 MG tablet, Take 1.5 tablets (7.5 mg total) by mouth daily as needed for Erectile Dysfunction., Disp: 20 tablet, Rfl: 0    aspirin (ECOTRIN) 81 MG EC tablet, Take 1 tablet (81 mg total) by mouth 2 (two) times a day. Increase to twice a day for 6 weeks and then restart daily dose on 10/4/22 (Patient not taking: Reported on 5/26/2025), Disp: , Rfl:     cholecalciferol, vitamin D3, 25 mcg (1,000 unit) Chew, Take 1 capsule by mouth once daily. (Patient not taking: Reported on 5/26/2025), Disp: , Rfl:

## (undated) DEVICE — GLOVE PROTEXIS HYDROGEL SZ8

## (undated) DEVICE — KIT SURGICAL COLON .25 1.1OZ

## (undated) DEVICE — KIT TOTAL HIP HLGC

## (undated) DEVICE — COLLECTION SPECIMEN NEPTUNE

## (undated) DEVICE — SOL NACL IRR 3000ML

## (undated) DEVICE — KIT TRACKING VIZADISC HIP

## (undated) DEVICE — ELECTRODE PATIENT RETURN DISP

## (undated) DEVICE — TAPE POROUS 3 IN 10 YD WHITE

## (undated) DEVICE — SPONGE LAP STRL 18X18IN

## (undated) DEVICE — GAUZE SPONGE 4'X4 12 PLY

## (undated) DEVICE — SUT PDS PLUS 1 TP1 96IN

## (undated) DEVICE — HOOD FLYTE SURGICOOL

## (undated) DEVICE — DRAPE MEDIUM SHEET 40X70IN

## (undated) DEVICE — KIT CELL SAVER PEDI

## (undated) DEVICE — PILLOW ABDUCTION FOAM LG

## (undated) DEVICE — SOLUTION ACD-A 500ML

## (undated) DEVICE — BLADE SURG STAINLESS STEEL #15

## (undated) DEVICE — GLOVE PROTEXIS BLUE LATEX 8

## (undated) DEVICE — SUT 1 36IN PDS II VIO MONO

## (undated) DEVICE — SUT VICRYL 1 OB 36 CTX

## (undated) DEVICE — COVER HD BACK TABLE 6FT

## (undated) DEVICE — SNARE ACUSNARE 7FRX3X6CM

## (undated) DEVICE — KIT CHECKPOINT TIBIAL

## (undated) DEVICE — TOWEL OR BLUE STRL 16X26 4/PK

## (undated) DEVICE — PILLOW ABDUCTION FOAM MED

## (undated) DEVICE — APPLICATOR CHLORAPREP ORN 26ML

## (undated) DEVICE — GAUZE SPONGE 4X4 12PLY

## (undated) DEVICE — CLOSURE SKIN STERI STRIP 1/2X4

## (undated) DEVICE — RETRIEVER SUTURE HEWSON DISP

## (undated) DEVICE — GLOVE PROTEXIS LTX MICRO 8

## (undated) DEVICE — DRAPE FULL SHEET 70X100IN

## (undated) DEVICE — SOL IRRI STRL WATER 1000ML

## (undated) DEVICE — SUT VICRYL 2-0 36 CT-1

## (undated) DEVICE — PAD ABD 8X10 STERILE

## (undated) DEVICE — KIT CANIST SUCTION 1200CC

## (undated) DEVICE — BLADE SAW SAG 22.13MM 0.92MM

## (undated) DEVICE — DRAPE STERI U-SHAPED 47X51IN

## (undated) DEVICE — SOL IRR NACL .9% 3000ML

## (undated) DEVICE — PIN BONE 4 X 140MM STERILE
Type: IMPLANTABLE DEVICE | Site: HIP | Status: NON-FUNCTIONAL
Removed: 2022-08-18

## (undated) DEVICE — TUBING O2 FEMALE CONN 13FT

## (undated) DEVICE — TAPE ADH MEDIPORE 4 X 10YDS

## (undated) DEVICE — KIT SURGICAL TURNOVER

## (undated) DEVICE — DRESSING ADAPTIC N ADH 3X8IN

## (undated) DEVICE — TIP SUCTION YANKAUER

## (undated) DEVICE — KIT DRAPE RIO ONE PIECE W/POCK

## (undated) DEVICE — GOWN POLY REINF X-LONG 2XL

## (undated) DEVICE — KIT C.A.T.S. FAST START 4/CASE

## (undated) DEVICE — SUT MCRYL PLUS 4-0 PS2 27IN